# Patient Record
Sex: MALE | Race: WHITE | Employment: FULL TIME | URBAN - METROPOLITAN AREA
[De-identification: names, ages, dates, MRNs, and addresses within clinical notes are randomized per-mention and may not be internally consistent; named-entity substitution may affect disease eponyms.]

---

## 2017-02-22 RX ORDER — AMOXICILLIN 500 MG/1
500 CAPSULE ORAL 3 TIMES DAILY
Qty: 21 CAP | Refills: 0 | Status: SHIPPED | OUTPATIENT
Start: 2017-02-22 | End: 2017-03-01

## 2017-06-07 ENCOUNTER — OFFICE VISIT (OUTPATIENT)
Dept: FAMILY MEDICINE CLINIC | Age: 47
End: 2017-06-07

## 2017-06-07 VITALS
OXYGEN SATURATION: 96 % | RESPIRATION RATE: 28 BRPM | HEART RATE: 87 BPM | HEIGHT: 71 IN | DIASTOLIC BLOOD PRESSURE: 94 MMHG | WEIGHT: 242.4 LBS | SYSTOLIC BLOOD PRESSURE: 154 MMHG | BODY MASS INDEX: 33.94 KG/M2 | TEMPERATURE: 98.4 F

## 2017-06-07 DIAGNOSIS — K21.00 GASTROESOPHAGEAL REFLUX DISEASE WITH ESOPHAGITIS: Chronic | ICD-10-CM

## 2017-06-07 DIAGNOSIS — E11.3299 TYPE 2 DIABETES MELLITUS WITH MILD NONPROLIFERATIVE RETINOPATHY WITHOUT MACULAR EDEMA, WITH LONG-TERM CURRENT USE OF INSULIN, UNSPECIFIED LATERALITY (HCC): Primary | ICD-10-CM

## 2017-06-07 DIAGNOSIS — B35.1 ONYCHOMYCOSIS: ICD-10-CM

## 2017-06-07 DIAGNOSIS — Z79.4 TYPE 2 DIABETES MELLITUS WITH MILD NONPROLIFERATIVE RETINOPATHY WITHOUT MACULAR EDEMA, WITH LONG-TERM CURRENT USE OF INSULIN, UNSPECIFIED LATERALITY (HCC): Primary | ICD-10-CM

## 2017-06-07 DIAGNOSIS — N52.9 ERECTILE DYSFUNCTION, UNSPECIFIED ERECTILE DYSFUNCTION TYPE: Chronic | ICD-10-CM

## 2017-06-07 LAB
GLUCOSE POC: 56 MG/DL
HBA1C MFR BLD HPLC: 7.4 %

## 2017-06-07 RX ORDER — VARDENAFIL HYDROCHLORIDE 20 MG/1
20 TABLET ORAL AS NEEDED
Qty: 3 TAB | Refills: 5 | Status: SHIPPED | OUTPATIENT
Start: 2017-06-07

## 2017-06-07 RX ORDER — GLIMEPIRIDE 4 MG/1
4 TABLET ORAL
Qty: 30 TAB | Refills: 11 | Status: SHIPPED | OUTPATIENT
Start: 2017-06-07 | End: 2017-09-22 | Stop reason: ALTCHOICE

## 2017-06-07 RX ORDER — PIOGLITAZONEHYDROCHLORIDE 45 MG/1
TABLET ORAL DAILY
COMMUNITY
End: 2017-08-03

## 2017-06-07 RX ORDER — METFORMIN HYDROCHLORIDE 750 MG/1
750 TABLET, EXTENDED RELEASE ORAL DAILY
Qty: 60 TAB | Refills: 11 | Status: SHIPPED | OUTPATIENT
Start: 2017-06-07 | End: 2017-09-22 | Stop reason: ALTCHOICE

## 2017-06-07 RX ORDER — TERBINAFINE HYDROCHLORIDE 250 MG/1
250 TABLET ORAL DAILY
Qty: 30 TAB | Refills: 2 | Status: SHIPPED | OUTPATIENT
Start: 2017-06-07 | End: 2017-08-03

## 2017-06-07 NOTE — PROGRESS NOTES
Subjective:     Shantel Navarro is a 51 y.o. male presenting for annual exam and complete physical.    Patient Active Problem List   Diagnosis Code    Type II or unspecified type diabetes mellitus with neurological manifestations, not stated as uncontrolled(250.60) E11.49    Esophageal reflux K21.9    ED (erectile dysfunction) N52.9    Encounter for long-term (current) use of other medications Z79.899    Retinopathy due to secondary diabetes [facility]) E13.319     Patient Active Problem List    Diagnosis Date Noted    Retinopathy due to secondary diabetes ([address] 06/15/2012    Encounter for long-term (current) use of other medications 06/14/2012    Esophageal reflux 03/07/2012    ED (erectile dysfunction) 03/07/2012    Type II or unspecified type diabetes mellitus with neurological manifestations, not stated as uncontrolled(250.60) 02/13/2012     Class: Chronic     Current Outpatient Prescriptions   Medication Sig Dispense Refill    pioglitazone (ACTOS) 45 mg tablet Take  by mouth daily.  glimepiride (AMARYL) 4 mg tablet Take 1 Tab by mouth every morning. 30 Tab 11    metFORMIN ER (GLUCOPHAGE XR) 750 mg tablet Take 1 Tab by mouth daily. 60 Tab 11    terbinafine HCl (LAMISIL) 250 mg tablet Take 1 Tab by mouth daily. 30 Tab 2    glucose blood VI test strips (BLOOD GLUCOSE TEST) strip by Does Not Apply route Daily (before breakfast). 30 Strip 11    vardenafil (LEVITRA) 20 mg tablet Take 20 mg by mouth as needed. 3 Tab 5    glucose blood VI test strips (ACCU-CHEK ADVANTAGE TEST) strip by Does Not Apply route four (4) times daily.  4 TIMES DAILY   100 Strip 11    Blood-Glucose Meter monitoring kit As directed 1 Kit 0    glucose blood VI test strips (BLOOD GLUCOSE TEST) strip Twice daily as needed 1 Package 11    Blood-Glucose Meter monitoring kit 4 TIMESDAILY 1 Kit 0    glucose blood VI test strips (FREESTYLE TEST) strip Twice daily 100 Strip 11    HUM INSULIN NPH/REG INSULIN HM (HUMULIN 70/30 SC) by SubCUTAneous route. No Known Allergies  Past Medical History:   Diagnosis Date    Diabetes ([facility]     ED (erectile dysfunction) 3/7/2012    Encounter for long-term (current) use of other medications 6/14/2012    Esophageal reflux 3/7/2012    IDDM (insulin dependent diabetes mellitus) [facility] 2/13/2012    Retinopathy due to secondary diabetes ([facility] 6/15/2012    Type II or unspecified type diabetes mellitus with neurological manifestations, not stated as uncontrolled 2/13/2012     Past Surgical History:   Procedure Laterality Date    HX ORTHOPAEDIC  1988    Knee surgery.      Family History   Problem Relation Age of Onset    Diabetes Mother     Heart Disease Mother     Hypertension Mother     Heart Disease Father      Social History   Substance Use Topics    Smoking status: Former Smoker    Smokeless tobacco: Former User     Quit date: 5/13/1995    Alcohol use Yes      Comment: socially             Review of Systems  Constitutional: negative  Eyes: negative  Ears, nose, mouth, throat, and face: negative  Respiratory: negative  Cardiovascular: negative  Gastrointestinal: negative  Genitourinary:positive for sexual problems    Objective:     Visit Vitals    BP (!) 154/94 (BP 1 Location: Right arm, BP Patient Position: Sitting)    Pulse 87    Temp 98.4 °F (36.9 °C) (Oral)    Resp 28    Ht 5' 11\" (1.803 m)    Wt 242 lb 6.4 oz (110 kg)    SpO2 96%    BMI 33.81 kg/m2     Physical exam:   General appearance - alert, well appearing, and in no distress  Mental status - alert, oriented to person, place, and time  Eyes - pupils equal and reactive, extraocular eye movements intact  Ears - bilateral TM's and external ear canals normal  Nose - normal and patent, no erythema, discharge or polyps  Mouth - mucous membranes moist, pharynx normal without lesions  Neck - supple, no significant adenopathy, carotids upstroke normal bilaterally, no bruits, thyroid exam: thyroid is normal in size without nodules or tenderness  Lymphatics - no palpable lymphadenopathy, no hepatosplenomegaly  Chest - clear to auscultation, no wheezes, rales or rhonchi, symmetric air entry  Heart - normal rate, regular rhythm, normal S1, S2, no murmurs, rubs, clicks or gallops  Abdomen - soft, nontender, nondistended, no masses or organomegaly  Rectal - negative without mass, lesions or tenderness, stool guaiac negative, PROSTATE EXAM: smooth and symmetric without nodules or tenderness  Extremities - peripheral pulses normal, no pedal edema, no clubbing or cyanosis  Skin - normal coloration and turgor, no rashes, no suspicious skin lesions noted            Comprehensive Diabetic Foot Exam  was performed    Assessment/Plan:     continue present diet with no restrictions, continue present plan, call if any problems. Zamzam Preciado was seen today for diabetes, hypertension and cholesterol problem. Diagnoses and all orders for this visit:    Type 2 diabetes mellitus with mild nonproliferative retinopathy without macular edema, with long-term current use of insulin, unspecified laterality  -     AMB POC HEMOGLOBIN A6N  -     METABOLIC PANEL, COMPREHENSIVE  -     CHOLESTEROL, TOTAL  -     AMB POC GLUCOSE, QUANTITATIVE, BLOOD  -     MICROALBUMIN, UR, RAND W/ MICROALBUMIN/CREA RATIO  -      DIABETES FOOT EXAM  [order this if you have performed a diabetic foot exam today)  -     glimepiride (AMARYL) 4 mg tablet; Take 1 Tab by mouth every morning.  -     metFORMIN ER (GLUCOPHAGE XR) 750 mg tablet; Take 1 Tab by mouth daily.  -     glucose blood VI test strips (BLOOD GLUCOSE TEST) strip; by Does Not Apply route Daily (before breakfast). Gastroesophageal reflux disease with esophagitis  -     METABOLIC PANEL, COMPREHENSIVE  -     CBC WITH AUTOMATED DIFF    Erectile dysfunction, unspecified erectile dysfunction type  -     vardenafil (LEVITRA) 20 mg tablet; Take 20 mg by mouth as needed.     Onychomycosis  -     terbinafine HCl (LAMISIL) 250 mg tablet; Take 1 Tab by mouth daily. Follow-up Disposition:  Return in about 4 weeks (around 7/5/2017). Yuridia Liu

## 2017-06-07 NOTE — MR AVS SNAPSHOT
Visit Information Date & Time Provider Department Dept. Phone Encounter #  
 6/7/2017  4:00 PM John Powell [phone] [id number] Follow-up Instructions Return in about 4 weeks (around 7/5/2017). Upcoming Health Maintenance Date Due  
 FOOT EXAM Q1 10/3/1979 HEMOGLOBIN A1C Q6M 6/13/2014 EYE EXAM RETINAL OR DILATED Q1 11/22/2014 MICROALBUMIN Q1 12/13/2014 LIPID PANEL Q1 12/13/2014 INFLUENZA AGE 9 TO ADULT 8/1/2017 DTaP/Tdap/Td series (2 - Td) 6/7/2027 Allergies as of 6/7/2017  Review Complete On: 6/7/2017 By: Willy Negron No Known Allergies Current Immunizations  Never Reviewed No immunizations on file. Not reviewed this visit You Were Diagnosed With   
  
 Codes Comments Type 2 diabetes mellitus with mild nonproliferative retinopathy without macular edema, with long-term current use of insulin, unspecified laterality    -  Primary ICD-10-CM: E11.3299, Z79.4 ICD-9-CM: 250.50, 362.04, V58.67 Gastroesophageal reflux disease with esophagitis     ICD-10-CM: K21.0 ICD-9-CM: 530.11 Erectile dysfunction, unspecified erectile dysfunction type     ICD-10-CM: N52.9 ICD-9-CM: 607.84 Onychomycosis     ICD-10-CM: B35.1 ICD-9-CM: 110.1 Vitals BP Pulse Temp Resp Height(growth percentile) Weight(growth percentile) (!) 154/94 (BP 1 Location: Right arm, BP Patient Position: Sitting) 87 98.4 °F (36.9 °C) (Oral) 28 5' 11\" (1.803 m) 242 lb 6.4 oz (110 kg) SpO2 BMI Smoking Status 96% 33.81 kg/m2 Former Smoker Vitals History BMI and BSA Data Body Mass Index Body Surface Area  
 33.81 kg/m 2 2.35 m 2 Preferred Pharmacy Pharmacy Name Phone [id number] [address] [phone] Your Updated Medication List  
  
   
This list is accurate as of: 6/7/17  4:50 PM.  Always use your most recent med list.  
 ACTOS 45 mg tablet Generic drug:  pioglitazone Take  by mouth daily. * Blood-Glucose Meter monitoring kit  
4 TIMESDAILY * Blood-Glucose Meter monitoring kit As directed  
  
 glimepiride 4 mg tablet Commonly known as:  AMARYL Take 1 Tab by mouth every morning. * glucose blood VI test strips strip Commonly known as:  FREESTYLE TEST Twice daily * glucose blood VI test strips strip Commonly known as:  ACCU-CHEK ADVANTAGE TEST  
by Does Not Apply route four (4) times daily. 4 TIMES DAILY * glucose blood VI test strips strip Commonly known as:  blood glucose test  
Twice daily as needed * glucose blood VI test strips strip Commonly known as:  blood glucose test  
by Does Not Apply route Daily (before breakfast). HUMULIN 70/30 SC  
by SubCUTAneous route. metFORMIN  mg tablet Commonly known as:  GLUCOPHAGE XR Take 1 Tab by mouth daily. terbinafine HCl 250 mg tablet Commonly known as:  LAMISIL Take 1 Tab by mouth daily. vardenafil 20 mg tablet Commonly known as:  LEVITRA Take 20 mg by mouth as needed. * Notice: This list has 6 medication(s) that are the same as other medications prescribed for you. Read the directions carefully, and ask your doctor or other care provider to review them with you. Prescriptions Printed Refills  
 vardenafil (LEVITRA) 20 mg tablet 5 Sig: Take 20 mg by mouth as needed. Class: Print Route: Oral  
  
Prescriptions Sent to Pharmacy Refills  
 glimepiride (AMARYL) 4 mg tablet 11 Sig: Take 1 Tab by mouth every morning. Class: Normal  
 Pharmacy: [id number] [facility], [facility] [address]  #: [phone] Route: Oral  
 metFORMIN ER (GLUCOPHAGE XR) 750 mg tablet 11 Sig: Take 1 Tab by mouth daily.   
 Class: Normal  
 [facility]: [id number] [address] Ph #: [phone] Route: Oral  
 terbinafine HCl (LAMISIL) 250 mg tablet 2 Sig: Take 1 Tab by mouth daily. Class: Normal  
 Pharmacy: [facility] [id number] #: [phone] Route: Oral  
 glucose blood VI test strips (BLOOD GLUCOSE TEST) strip 11 Sig: by Does Not Apply route Daily (before breakfast). Class: Normal  
 Pharmacy: [facility] [id number] #: [phone] Route: Does Not Apply We Performed the Following AMB POC GLUCOSE, QUANTITATIVE, BLOOD [11571 CPT(R)] AMB POC HEMOGLOBIN A1C [05710 CPT(R)] CBC WITH AUTOMATED DIFF [04924 CPT(R)] CHOLESTEROL, TOTAL [23441 CPT(R)]  DIABETES FOOT EXAM [HM7 Custom] METABOLIC PANEL, COMPREHENSIVE [30096 CPT(R)] MICROALBUMIN, UR, RAND W/ MICROALBUMIN/CREA RATIO [id number] CPT(R)] Follow-up Instructions Return in about 4 weeks (around 7/5/2017). Introducing [facility]! Cheo Lopez introduces [employer] patient portal. Now you can access parts of your medical record, email your doctor's office, and request medication refills online. 1. In your internet browser, go to [email or web address] 2. Click on the First Time User? Click Here link in the Sign In box. You will see the New Member Sign Up page. 3. Enter your [employer] Access Code exactly as it appears below. You will not need to use this code after youve completed the sign-up process. If you do not sign up before the expiration date, you must request a new code. · [employer] Access Code: [email or web address] Expires: 9/5/2017  4:25 PM 
 
4. Enter the last four digits of your Social Security Number (xxxx) and Date of Birth (mm/dd/yyyy) as indicated and click Submit. You will be taken to the next sign-up page. 5. Create a [employer] ID.  This will be your [employer] login ID and cannot be changed, so think of one that is secure and easy to remember. 6. Create a [employer] password. You can change your password at any time. 7. Enter your Password Reset Question and Answer. This can be used at a later time if you forget your password. 8. Enter your e-mail address. You will receive e-mail notification when new information is available in [address] 9. Click Sign Up. You can now view and download portions of your medical record. 10. Click the Download Summary menu link to download a portable copy of your medical information. If you have questions, please visit the Frequently Asked Questions section of the [employer] website. Remember, [employer] is NOT to be used for urgent needs. For medical emergencies, dial 911. Now available from your iPhone and Android! Please provide this summary of care documentation to your next provider. Your primary care clinician is listed as Jeane Duncan. If you have any questions after today's visit, please call [phone].

## 2017-06-08 LAB
ALBUMIN SERPL-MCNC: 4.6 G/DL (ref 3.5–5.5)
ALBUMIN/CREAT UR: 125.1 MG/G CREAT (ref 0–30)
ALBUMIN/GLOB SERPL: 1.9 {RATIO} (ref 1.2–2.2)
ALP SERPL-CCNC: 122 IU/L (ref 39–117)
ALT SERPL-CCNC: 57 IU/L (ref 0–44)
AST SERPL-CCNC: 39 IU/L (ref 0–40)
BASOPHILS # BLD AUTO: 0 X10E3/UL (ref 0–0.2)
BASOPHILS NFR BLD AUTO: 0 %
BILIRUB SERPL-MCNC: 0.5 MG/DL (ref 0–1.2)
BUN SERPL-MCNC: 20 MG/DL (ref 6–24)
BUN/CREAT SERPL: 16 (ref 9–20)
CALCIUM SERPL-MCNC: 9.5 MG/DL (ref 8.7–10.2)
CHLORIDE SERPL-SCNC: 101 MMOL/L (ref 96–106)
CHOLEST SERPL-MCNC: 219 MG/DL (ref 100–199)
CO2 SERPL-SCNC: 28 MMOL/L (ref 18–29)
CREAT SERPL-MCNC: 1.27 MG/DL (ref 0.76–1.27)
CREAT UR-MCNC: 37.8 MG/DL
EOSINOPHIL # BLD AUTO: 0.2 X10E3/UL (ref 0–0.4)
EOSINOPHIL NFR BLD AUTO: 2 %
ERYTHROCYTE [DISTWIDTH] IN BLOOD BY AUTOMATED COUNT: 14.1 % (ref 12.3–15.4)
GLOBULIN SER CALC-MCNC: 2.4 G/DL (ref 1.5–4.5)
GLUCOSE SERPL-MCNC: 68 MG/DL (ref 65–99)
HCT VFR BLD AUTO: 48.6 % (ref 37.5–51)
HGB BLD-MCNC: 16.8 G/DL (ref 12.6–17.7)
IMM GRANULOCYTES # BLD: 0 X10E3/UL (ref 0–0.1)
IMM GRANULOCYTES NFR BLD: 0 %
LYMPHOCYTES # BLD AUTO: 2.2 X10E3/UL (ref 0.7–3.1)
LYMPHOCYTES NFR BLD AUTO: 34 %
MCH RBC QN AUTO: 30.6 PG (ref 26.6–33)
MCHC RBC AUTO-ENTMCNC: 34.6 G/DL (ref 31.5–35.7)
MCV RBC AUTO: 89 FL (ref 79–97)
MICROALBUMIN UR-MCNC: 47.3 UG/ML
MONOCYTES # BLD AUTO: 0.6 X10E3/UL (ref 0.1–0.9)
MONOCYTES NFR BLD AUTO: 10 %
NEUTROPHILS # BLD AUTO: 3.5 X10E3/UL (ref 1.4–7)
NEUTROPHILS NFR BLD AUTO: 54 %
PLATELET # BLD AUTO: 206 X10E3/UL (ref 150–379)
POTASSIUM SERPL-SCNC: 4.7 MMOL/L (ref 3.5–5.2)
PROT SERPL-MCNC: 7 G/DL (ref 6–8.5)
RBC # BLD AUTO: 5.49 X10E6/UL (ref 4.14–5.8)
SODIUM SERPL-SCNC: 146 MMOL/L (ref 134–144)
WBC # BLD AUTO: 6.4 X10E3/UL (ref 3.4–10.8)

## 2017-07-31 ENCOUNTER — HOSPITAL ENCOUNTER (INPATIENT)
Age: 47
LOS: 3 days | Discharge: HOME | DRG: 637 | End: 2017-08-03
Attending: EMERGENCY MEDICINE | Admitting: INTERNAL MEDICINE
Payer: COMMERCIAL

## 2017-07-31 ENCOUNTER — APPOINTMENT (OUTPATIENT)
Dept: GENERAL RADIOLOGY | Age: 47
DRG: 637 | End: 2017-07-31
Attending: EMERGENCY MEDICINE
Payer: COMMERCIAL

## 2017-07-31 DIAGNOSIS — E87.5 ACUTE HYPERKALEMIA: ICD-10-CM

## 2017-07-31 DIAGNOSIS — E87.20 LACTIC ACID ACIDOSIS: ICD-10-CM

## 2017-07-31 DIAGNOSIS — N17.9 ACUTE RENAL FAILURE, UNSPECIFIED ACUTE RENAL FAILURE TYPE (HCC): ICD-10-CM

## 2017-07-31 DIAGNOSIS — R11.10 VOMITING, INTRACTABILITY OF VOMITING NOT SPECIFIED, PRESENCE OF NAUSEA NOT SPECIFIED, UNSPECIFIED VOMITING TYPE: ICD-10-CM

## 2017-07-31 DIAGNOSIS — E11.10 DIABETIC KETOACIDOSIS WITHOUT COMA ASSOCIATED WITH TYPE 2 DIABETES MELLITUS (HCC): Primary | ICD-10-CM

## 2017-07-31 LAB
ADMINISTERED INITIALS, ADMINIT: NORMAL
ANION GAP BLD CALC-SCNC: 14 MMOL/L (ref 5–15)
ANION GAP BLD CALC-SCNC: 28 MMOL/L (ref 5–15)
ANION GAP BLD CALC-SCNC: 33 MMOL/L (ref 5–15)
ANION GAP BLD CALC-SCNC: ABNORMAL MMOL/L (ref 5–15)
APPEARANCE UR: ABNORMAL
ATRIAL RATE: 124 BPM
BACTERIA URNS QL MICRO: NEGATIVE /HPF
BASE DEFICIT BLDV-SCNC: 28.9 MMOL/L
BASOPHILS # BLD AUTO: 0 K/UL
BASOPHILS # BLD: 0 %
BDY SITE: ABNORMAL
BILIRUB UR QL: NEGATIVE
BUN BLD-MCNC: 53 MG/DL (ref 9–20)
BUN SERPL-MCNC: 31 MG/DL (ref 6–20)
BUN SERPL-MCNC: 40 MG/DL (ref 6–20)
BUN SERPL-MCNC: 48 MG/DL (ref 6–20)
BUN/CREAT SERPL: 15 (ref 12–20)
BUN/CREAT SERPL: 17 (ref 12–20)
BUN/CREAT SERPL: 17 (ref 12–20)
CA-I BLD-MCNC: 1.12 MMOL/L (ref 1.12–1.32)
CALCIUM SERPL-MCNC: 12.9 MG/DL (ref 8.5–10.1)
CALCIUM SERPL-MCNC: 7.2 MG/DL (ref 8.5–10.1)
CALCIUM SERPL-MCNC: 7.8 MG/DL (ref 8.5–10.1)
CALCULATED P AXIS, ECG09: 59 DEGREES
CALCULATED R AXIS, ECG10: 13 DEGREES
CALCULATED T AXIS, ECG11: 48 DEGREES
CHLORIDE BLD-SCNC: 92 MMOL/L (ref 98–107)
CHLORIDE SERPL-SCNC: 100 MMOL/L (ref 97–108)
CHLORIDE SERPL-SCNC: 108 MMOL/L (ref 97–108)
CHLORIDE SERPL-SCNC: 110 MMOL/L (ref 97–108)
CO2 BLD-SCNC: 8 MMOL/L (ref 21–32)
CO2 SERPL-SCNC: 20 MMOL/L (ref 21–32)
CO2 SERPL-SCNC: 7 MMOL/L (ref 21–32)
CO2 SERPL-SCNC: <5 MMOL/L (ref 21–32)
COLOR UR: ABNORMAL
CREAT BLD-MCNC: 2.3 MG/DL (ref 0.6–1.3)
CREAT SERPL-MCNC: 1.8 MG/DL (ref 0.7–1.3)
CREAT SERPL-MCNC: 2.63 MG/DL (ref 0.7–1.3)
CREAT SERPL-MCNC: 2.89 MG/DL (ref 0.7–1.3)
D50 ADMINISTERED, D50ADM: 0 ML
D50 ORDER, D50ORD: 0 ML
DIAGNOSIS, 93000: NORMAL
DIFFERENTIAL METHOD BLD: ABNORMAL
EOSINOPHIL # BLD: 0.3 K/UL
EOSINOPHIL NFR BLD: 1 %
EPITH CASTS URNS QL MICRO: ABNORMAL /LPF
ERYTHROCYTE [DISTWIDTH] IN BLOOD BY AUTOMATED COUNT: 14.7 % (ref 11.5–14.5)
EST. AVERAGE GLUCOSE BLD GHB EST-MCNC: 226 MG/DL
GLSCOM COMMENTS: NORMAL
GLUCOSE BLD STRIP.AUTO-MCNC: 304 MG/DL (ref 65–100)
GLUCOSE BLD STRIP.AUTO-MCNC: 346 MG/DL (ref 65–100)
GLUCOSE BLD STRIP.AUTO-MCNC: 415 MG/DL (ref 65–100)
GLUCOSE BLD STRIP.AUTO-MCNC: 513 MG/DL (ref 65–100)
GLUCOSE BLD STRIP.AUTO-MCNC: 569 MG/DL (ref 65–100)
GLUCOSE BLD STRIP.AUTO-MCNC: >600 MG/DL (ref 65–100)
GLUCOSE BLD-MCNC: >700 MG/DL (ref 65–100)
GLUCOSE SERPL-MCNC: 401 MG/DL (ref 65–100)
GLUCOSE SERPL-MCNC: 765 MG/DL (ref 65–100)
GLUCOSE SERPL-MCNC: 851 MG/DL (ref 65–100)
GLUCOSE UR STRIP.AUTO-MCNC: >1000 MG/DL
GLUCOSE, GLC: 304 MG/DL
GLUCOSE, GLC: 346 MG/DL
GLUCOSE, GLC: 415 MG/DL
GLUCOSE, GLC: 513 MG/DL
GLUCOSE, GLC: 569 MG/DL
GLUCOSE, GLC: 601 MG/DL
HBA1C MFR BLD: 9.5 % (ref 4.2–6.3)
HCO3 BLDV-SCNC: 4 MMOL/L (ref 23–28)
HCT VFR BLD AUTO: 46.7 % (ref 36.6–50.3)
HCT VFR BLD CALC: 49 % (ref 36.6–50.3)
HGB BLD-MCNC: 15.4 G/DL (ref 12.1–17)
HGB BLD-MCNC: 16.7 GM/DL (ref 12.1–17)
HGB UR QL STRIP: ABNORMAL
HIGH TARGET, HITG: 250 MG/DL
HYALINE CASTS URNS QL MICRO: ABNORMAL /LPF (ref 0–5)
INSULIN ADMINSTERED, INSADM: 10.8 UNITS/HOUR
INSULIN ADMINSTERED, INSADM: 16.2 UNITS/HOUR
INSULIN ADMINSTERED, INSADM: 21.6 UNITS/HOUR
INSULIN ADMINSTERED, INSADM: 24.4 UNITS/HOUR
INSULIN ADMINSTERED, INSADM: 25.7 UNITS/HOUR
INSULIN ADMINSTERED, INSADM: 27.1 UNITS/HOUR
INSULIN ADMINSTERED, INSADM: 28.4 UNITS/HOUR
INSULIN ADMINSTERED, INSADM: 32.5 UNITS/HOUR
INSULIN ADMINSTERED, INSADM: 35.6 UNITS/HOUR
INSULIN ADMINSTERED, INSADM: 36.2 UNITS/HOUR
INSULIN ORDER, INSORD: 10.8 UNITS/HOUR
INSULIN ORDER, INSORD: 16.2 UNITS/HOUR
INSULIN ORDER, INSORD: 21.6 UNITS/HOUR
INSULIN ORDER, INSORD: 24.4 UNITS/HOUR
INSULIN ORDER, INSORD: 25.7 UNITS/HOUR
INSULIN ORDER, INSORD: 27.1 UNITS/HOUR
INSULIN ORDER, INSORD: 28.4 UNITS/HOUR
INSULIN ORDER, INSORD: 32.5 UNITS/HOUR
INSULIN ORDER, INSORD: 35.6 UNITS/HOUR
INSULIN ORDER, INSORD: 36.2 UNITS/HOUR
KETONES UR QL STRIP.AUTO: 40 MG/DL
LACTATE SERPL-SCNC: 3 MMOL/L (ref 0.4–2)
LACTATE SERPL-SCNC: 9.8 MMOL/L (ref 0.4–2)
LEUKOCYTE ESTERASE UR QL STRIP.AUTO: NEGATIVE
LOW TARGET, LOT: 150 MG/DL
LYMPHOCYTES # BLD AUTO: 8 %
LYMPHOCYTES # BLD: 2.5 K/UL
MAGNESIUM SERPL-MCNC: 1.5 MG/DL (ref 1.6–2.4)
MAGNESIUM SERPL-MCNC: 2.2 MG/DL (ref 1.6–2.4)
MAGNESIUM SERPL-MCNC: 2.4 MG/DL (ref 1.6–2.4)
MAGNESIUM SERPL-MCNC: 2.5 MG/DL (ref 1.6–2.4)
MCH RBC QN AUTO: 31.3 PG (ref 26–34)
MCHC RBC AUTO-ENTMCNC: 33 G/DL (ref 30–36.5)
MCV RBC AUTO: 94.9 FL (ref 80–99)
MINUTES UNTIL NEXT BG, NBG: 60 MIN
MONOCYTES # BLD: 0 K/UL
MONOCYTES NFR BLD AUTO: 0 %
MULTIPLIER, MUL: 0.02
MULTIPLIER, MUL: 0.03
MULTIPLIER, MUL: 0.04
MULTIPLIER, MUL: 0.05
MULTIPLIER, MUL: 0.06
MULTIPLIER, MUL: 0.07
MULTIPLIER, MUL: 0.08
MULTIPLIER, MUL: 0.08
MULTIPLIER, MUL: 0.09
MULTIPLIER, MUL: 0.1
NEUTS BAND NFR BLD MANUAL: 18 %
NEUTS SEG # BLD: 28.6 K/UL
NEUTS SEG NFR BLD AUTO: 73 %
NITRITE UR QL STRIP.AUTO: NEGATIVE
ORDER INITIALS, ORDINIT: NORMAL
P-R INTERVAL, ECG05: 154 MS
PCO2 BLDV: 20 MMHG (ref 41–51)
PH BLDV: 6.87 [PH] (ref 7.32–7.42)
PH UR STRIP: 5 [PH] (ref 5–8)
PHOSPHATE SERPL-MCNC: 4.8 MG/DL (ref 2.6–4.7)
PHOSPHATE SERPL-MCNC: 6.1 MG/DL (ref 2.6–4.7)
PHOSPHATE SERPL-MCNC: <0.5 MG/DL (ref 2.6–4.7)
PLATELET # BLD AUTO: 295 K/UL (ref 150–400)
PO2 BLDV: 59 MMHG (ref 25–40)
POTASSIUM BLD-SCNC: 6.1 MMOL/L (ref 3.5–5.1)
POTASSIUM SERPL-SCNC: 3.4 MMOL/L (ref 3.5–5.1)
POTASSIUM SERPL-SCNC: 3.7 MMOL/L (ref 3.5–5.1)
POTASSIUM SERPL-SCNC: 4.5 MMOL/L (ref 3.5–5.1)
PROT UR STRIP-MCNC: 30 MG/DL
Q-T INTERVAL, ECG07: 330 MS
QRS DURATION, ECG06: 100 MS
QTC CALCULATION (BEZET), ECG08: 474 MS
RBC # BLD AUTO: 4.92 M/UL (ref 4.1–5.7)
RBC #/AREA URNS HPF: ABNORMAL /HPF (ref 0–5)
RBC MORPH BLD: ABNORMAL
SAO2 % BLDV: 68 % (ref 65–88)
SAO2% DEVICE SAO2% SENSOR NAME: ABNORMAL
SERVICE CMNT-IMP: ABNORMAL
SODIUM BLD-SCNC: 126 MMOL/L (ref 136–145)
SODIUM SERPL-SCNC: 135 MMOL/L (ref 136–145)
SODIUM SERPL-SCNC: 139 MMOL/L (ref 136–145)
SODIUM SERPL-SCNC: 144 MMOL/L (ref 136–145)
SP GR UR REFRACTOMETRY: 1.02 (ref 1–1.03)
SPECIMEN SITE: ABNORMAL
UROBILINOGEN UR QL STRIP.AUTO: 0.2 EU/DL (ref 0.2–1)
VENTRICULAR RATE, ECG03: 124 BPM
WBC # BLD AUTO: 31.4 K/UL (ref 4.1–11.1)
WBC URNS QL MICRO: ABNORMAL /HPF (ref 0–4)

## 2017-07-31 PROCEDURE — 82962 GLUCOSE BLOOD TEST: CPT

## 2017-07-31 PROCEDURE — 83735 ASSAY OF MAGNESIUM: CPT | Performed by: EMERGENCY MEDICINE

## 2017-07-31 PROCEDURE — 36415 COLL VENOUS BLD VENIPUNCTURE: CPT | Performed by: FAMILY MEDICINE

## 2017-07-31 PROCEDURE — 82010 KETONE BODYS QUAN: CPT | Performed by: EMERGENCY MEDICINE

## 2017-07-31 PROCEDURE — 74011000250 HC RX REV CODE- 250: Performed by: HOSPITALIST

## 2017-07-31 PROCEDURE — 74011250636 HC RX REV CODE- 250/636: Performed by: HOSPITALIST

## 2017-07-31 PROCEDURE — 80048 BASIC METABOLIC PNL TOTAL CA: CPT | Performed by: HOSPITALIST

## 2017-07-31 PROCEDURE — 74011636637 HC RX REV CODE- 636/637: Performed by: EMERGENCY MEDICINE

## 2017-07-31 PROCEDURE — 80048 BASIC METABOLIC PNL TOTAL CA: CPT | Performed by: FAMILY MEDICINE

## 2017-07-31 PROCEDURE — 83605 ASSAY OF LACTIC ACID: CPT | Performed by: EMERGENCY MEDICINE

## 2017-07-31 PROCEDURE — 81001 URINALYSIS AUTO W/SCOPE: CPT | Performed by: EMERGENCY MEDICINE

## 2017-07-31 PROCEDURE — 80047 BASIC METABLC PNL IONIZED CA: CPT

## 2017-07-31 PROCEDURE — 74011000250 HC RX REV CODE- 250: Performed by: EMERGENCY MEDICINE

## 2017-07-31 PROCEDURE — 82803 BLOOD GASES ANY COMBINATION: CPT | Performed by: EMERGENCY MEDICINE

## 2017-07-31 PROCEDURE — 74011250636 HC RX REV CODE- 250/636: Performed by: INTERNAL MEDICINE

## 2017-07-31 PROCEDURE — 83735 ASSAY OF MAGNESIUM: CPT | Performed by: INTERNAL MEDICINE

## 2017-07-31 PROCEDURE — 96368 THER/DIAG CONCURRENT INF: CPT

## 2017-07-31 PROCEDURE — 83036 HEMOGLOBIN GLYCOSYLATED A1C: CPT | Performed by: EMERGENCY MEDICINE

## 2017-07-31 PROCEDURE — 84100 ASSAY OF PHOSPHORUS: CPT | Performed by: FAMILY MEDICINE

## 2017-07-31 PROCEDURE — 99285 EMERGENCY DEPT VISIT HI MDM: CPT

## 2017-07-31 PROCEDURE — 87040 BLOOD CULTURE FOR BACTERIA: CPT | Performed by: EMERGENCY MEDICINE

## 2017-07-31 PROCEDURE — 85025 COMPLETE CBC W/AUTO DIFF WBC: CPT | Performed by: EMERGENCY MEDICINE

## 2017-07-31 PROCEDURE — 93005 ELECTROCARDIOGRAM TRACING: CPT

## 2017-07-31 PROCEDURE — 71010 XR CHEST PORT: CPT

## 2017-07-31 PROCEDURE — 96366 THER/PROPH/DIAG IV INF ADDON: CPT

## 2017-07-31 PROCEDURE — 65660000000 HC RM CCU STEPDOWN

## 2017-07-31 PROCEDURE — 74011250636 HC RX REV CODE- 250/636: Performed by: EMERGENCY MEDICINE

## 2017-07-31 PROCEDURE — 83735 ASSAY OF MAGNESIUM: CPT | Performed by: FAMILY MEDICINE

## 2017-07-31 PROCEDURE — 74011000258 HC RX REV CODE- 258: Performed by: EMERGENCY MEDICINE

## 2017-07-31 PROCEDURE — 96375 TX/PRO/DX INJ NEW DRUG ADDON: CPT

## 2017-07-31 PROCEDURE — 84100 ASSAY OF PHOSPHORUS: CPT | Performed by: INTERNAL MEDICINE

## 2017-07-31 PROCEDURE — 96365 THER/PROPH/DIAG IV INF INIT: CPT

## 2017-07-31 PROCEDURE — 84100 ASSAY OF PHOSPHORUS: CPT | Performed by: EMERGENCY MEDICINE

## 2017-07-31 PROCEDURE — 80048 BASIC METABOLIC PNL TOTAL CA: CPT | Performed by: EMERGENCY MEDICINE

## 2017-07-31 RX ORDER — ACETAMINOPHEN 325 MG/1
650 TABLET ORAL
Status: DISCONTINUED | OUTPATIENT
Start: 2017-07-31 | End: 2017-08-03 | Stop reason: HOSPADM

## 2017-07-31 RX ORDER — MAGNESIUM SULFATE 100 %
4 CRYSTALS MISCELLANEOUS AS NEEDED
Status: DISCONTINUED | OUTPATIENT
Start: 2017-07-31 | End: 2017-08-03 | Stop reason: HOSPADM

## 2017-07-31 RX ORDER — DEXTROSE 50 % IN WATER (D50W) INTRAVENOUS SYRINGE
12.5-25 AS NEEDED
Status: DISCONTINUED | OUTPATIENT
Start: 2017-07-31 | End: 2017-08-03 | Stop reason: HOSPADM

## 2017-07-31 RX ORDER — SODIUM CHLORIDE 9 MG/ML
2000 INJECTION, SOLUTION INTRAVENOUS ONCE
Status: COMPLETED | OUTPATIENT
Start: 2017-07-31 | End: 2017-07-31

## 2017-07-31 RX ORDER — SODIUM CHLORIDE 0.9 % (FLUSH) 0.9 %
5-10 SYRINGE (ML) INJECTION AS NEEDED
Status: DISCONTINUED | OUTPATIENT
Start: 2017-07-31 | End: 2017-08-03 | Stop reason: HOSPADM

## 2017-07-31 RX ORDER — SODIUM CHLORIDE 9 MG/ML
1000 INJECTION, SOLUTION INTRAVENOUS CONTINUOUS
Status: DISCONTINUED | OUTPATIENT
Start: 2017-07-31 | End: 2017-08-01

## 2017-07-31 RX ORDER — ONDANSETRON 2 MG/ML
4 INJECTION INTRAMUSCULAR; INTRAVENOUS
Status: DISCONTINUED | OUTPATIENT
Start: 2017-07-31 | End: 2017-08-03 | Stop reason: HOSPADM

## 2017-07-31 RX ORDER — ENOXAPARIN SODIUM 100 MG/ML
40 INJECTION SUBCUTANEOUS EVERY 24 HOURS
Status: DISCONTINUED | OUTPATIENT
Start: 2017-07-31 | End: 2017-08-03 | Stop reason: HOSPADM

## 2017-07-31 RX ORDER — SODIUM BICARBONATE 1 MEQ/ML
50 SYRINGE (ML) INTRAVENOUS
Status: COMPLETED | OUTPATIENT
Start: 2017-07-31 | End: 2017-07-31

## 2017-07-31 RX ORDER — SODIUM CHLORIDE 9 MG/ML
250 INJECTION, SOLUTION INTRAVENOUS CONTINUOUS
Status: DISCONTINUED | OUTPATIENT
Start: 2017-07-31 | End: 2017-08-01

## 2017-07-31 RX ORDER — DEXTROSE 50 % IN WATER (D50W) INTRAVENOUS SYRINGE
12.5-25 AS NEEDED
Status: DISCONTINUED | OUTPATIENT
Start: 2017-07-31 | End: 2017-07-31

## 2017-07-31 RX ORDER — MAGNESIUM SULFATE 100 %
4 CRYSTALS MISCELLANEOUS AS NEEDED
Status: DISCONTINUED | OUTPATIENT
Start: 2017-07-31 | End: 2017-07-31

## 2017-07-31 RX ADMIN — SODIUM CHLORIDE 27.1 UNITS/HR: 900 INJECTION, SOLUTION INTRAVENOUS at 17:36

## 2017-07-31 RX ADMIN — SODIUM CHLORIDE 10.8 UNITS/HR: 900 INJECTION, SOLUTION INTRAVENOUS at 12:25

## 2017-07-31 RX ADMIN — POTASSIUM PHOSPHATE, MONOBASIC AND POTASSIUM PHOSPHATE, DIBASIC: 224; 236 INJECTION, SOLUTION INTRAVENOUS at 23:39

## 2017-07-31 RX ADMIN — Medication 10 ML: at 18:00

## 2017-07-31 RX ADMIN — VANCOMYCIN HYDROCHLORIDE 2750 MG: 10 INJECTION, POWDER, LYOPHILIZED, FOR SOLUTION INTRAVENOUS at 13:45

## 2017-07-31 RX ADMIN — SODIUM CHLORIDE 2000 ML: 900 INJECTION, SOLUTION INTRAVENOUS at 11:22

## 2017-07-31 RX ADMIN — SODIUM CHLORIDE 250 ML/HR: 900 INJECTION, SOLUTION INTRAVENOUS at 16:34

## 2017-07-31 RX ADMIN — SODIUM CHLORIDE 250 ML/HR: 900 INJECTION, SOLUTION INTRAVENOUS at 12:33

## 2017-07-31 RX ADMIN — ENOXAPARIN SODIUM 40 MG: 40 INJECTION SUBCUTANEOUS at 17:35

## 2017-07-31 RX ADMIN — SODIUM BICARBONATE 50 MEQ: 84 INJECTION, SOLUTION INTRAVENOUS at 12:44

## 2017-07-31 RX ADMIN — ONDANSETRON 4 MG: 2 INJECTION INTRAMUSCULAR; INTRAVENOUS at 18:00

## 2017-07-31 RX ADMIN — SODIUM CHLORIDE 1000 ML: 900 INJECTION, SOLUTION INTRAVENOUS at 12:19

## 2017-07-31 RX ADMIN — PIPERACILLIN SODIUM,TAZOBACTAM SODIUM 4.5 G: 4; .5 INJECTION, POWDER, FOR SOLUTION INTRAVENOUS at 12:43

## 2017-07-31 RX ADMIN — SODIUM CHLORIDE 1000 ML: 900 INJECTION, SOLUTION INTRAVENOUS at 14:22

## 2017-07-31 RX ADMIN — CALCIUM GLUCONATE 2 G: 94 INJECTION, SOLUTION INTRAVENOUS at 12:14

## 2017-07-31 NOTE — PROGRESS NOTES
PCU SHIFT NURSING NOTE      Bedside and Verbal shift change report given to Jeaneth Arrington (oncoming nurse) by ANGEL Sandoval RN (offgoing nurse). Report included the following information SBAR, Kardex, Procedure Summary, Intake/Output, MAR and Recent Results. Shift Summary: 4046 2242: Received report from ER for patient coming to room 2263  1700: Patient now in room, heart rate sinus tachy in 120's, respirations mid 20's. Complaints of throat pain and discomfort, patient given moistened  mouth swabs. Insulin drip infusing at 27.1. Continuous fluids also infusing at 250 ml/hr  1750: Results back from BMP ordered, glucose at 851 and CO2 7, insulin drip now infusing at 32.5 ml/hr. Dr Cristiana Brennan paged to update on patient's condition  1815: Spoke with Dr. Cristiana Brennan and no new orders received at this time. Will wait for next blood draw at 8 pm to assess CO2 and glucose again. Admission Date 7/31/2017   Admission Diagnosis DKA (diabetic k[facility]acidoses) ([facility]   Consults None        Consults   []PT   []OT   []Speech   []Case Management      [] Palliative      Cardiac Monitoring Order   [x]Yes   []No     IV drips   [x]Yes    Drip:  insulin                          Dose:  Drip:                            Dose:  Drip:                            Dose:   []No     GI Prophylaxis   []Yes   []No         DVT Prophylaxis   SCDs:             Erd stockings:         [x] Medication   []Contraindicated   []None      Activity Level           Purposeful Rounding every 1-2 hour? []Yes   Jimenez Score      Bed Alarm (If score 3 or >)   []Yes   [] Refused (See signed refusal form in chart)   Joey Score      Joey Score (if score 14 or less)   []PMT consult   []Wound Care consult      []Specialty bed   [] Nutrition consult          Needs prior to discharge:   Home O2 required:    []Yes   []No    If yes, how much O2 required?     Other:    Last Bowel Movement:        Influenza Vaccine          Pneumonia Vaccine           Diet Active Orders Diet    DIET NPO With Ice Chips      LDAs               Peripheral IV 07/31/17 Right Hand (Active)   Site Assessment Clean, dry, & intact 7/31/2017 11:14 AM   Phlebitis Assessment 0 7/31/2017 11:14 AM   Infiltration Assessment 0 7/31/2017 11:14 AM   Dressing Status Clean, dry, & intact 7/31/2017 11:14 AM   Dressing Type Tape;Transparent 7/31/2017 11:14 AM       Peripheral IV 07/31/17 Left Hand (Active)   Site Assessment Clean, dry, & intact 7/31/2017 11:14 AM   Phlebitis Assessment 0 7/31/2017 11:14 AM   Infiltration Assessment 0 7/31/2017 11:14 AM   Dressing Status Clean, dry, & intact 7/31/2017 11:14 AM   Dressing Type Tape;Transparent 7/31/2017 11:14 AM                      Urinary Catheter      Intake & Output   Date 07/30/17 0700 - 07/31/17 0659(Not Admitted) 07/31/17 0700 - 08/01/17 0659   Shift 2781-0576 9099-8579 24 Hour Total 2301-2155 8945-0153 24 Hour Total   I  N  T  A  K  E   Shift Total  (mL/kg)         O  U  T  P  U  T   Urine    1025  1025      Urine Voided    1025  1025    Shift Total  (mL/kg)    1025  (9.3)  1025  (9.3)   NET    -1025  -1025   Weight (kg)    110 110 110         Readmission Risk Assessment Tool Score Low Risk            3       Total Score        3 Charlson Comorbidity Score (Age + Comorbid Conditions)        Criteria that do not apply:    Has Seen PCP in Last 6 Months (Yes=3, No=0)    . Living with Significant Other. Assisted Living. LTAC. SNF. or   Rehab    Patient Length of Stay (>5 days = 3)    IP Visits Last 12 Months (1-3=4, 4=9, >4=11)    Pt.  Coverage (Medicare=5 , Medicaid, or Self-Pay=4)       Expected Length of Stay - - -   Actual Length of Stay 0

## 2017-07-31 NOTE — ED PROVIDER NOTES
HPI Comments: Mercy Ravi is a 51 y.o. male with PMhx significant for IDDM who presents ambulatory to the ED with cc of nausea and vomiting since onset yesterday with associated symptoms of abdominal pain and SOB. Pt states his last episode of vomiting occurred shortly PTA. The pain has been constant since onset. He reports a hx of DM for which he is currently on Metformin and Glimepiride. He denies any prior hx of DKA. He denies any modifying factors. He denies any cough, congestion, rash, diarrhea, or constipation. Social Hx: - Tobacco (former), + EtOH, - Illicit Drugs    PCP: Liza Currie MD    There are no other complaints, changes or physical findings at this time. The history is provided by the patient and the spouse. No  was used. Past Medical History:   Diagnosis Date    Diabetes [facility])     ED (erectile dysfunction) 3/7/2012    Encounter for long-term (current) use of other medications 6/14/2012    Esophageal reflux 3/7/2012    IDDM (insulin dependent diabetes mellitus) ([facility] 2/13/2012    Retinopathy due to secondary diabetes ([facility] 6/15/2012    Type II or unspecified type diabetes mellitus with neurological manifestations, not stated as uncontrolled 2/13/2012       Past Surgical History:   Procedure Laterality Date    HX ORTHOPAEDIC  1988    Knee surgery. Family History:   Problem Relation Age of Onset    Diabetes Mother     Heart Disease Mother     Hypertension Mother     Heart Disease Father        Social History     Social History    Marital status:      Spouse name: N/A    Number of children: N/A    Years of education: N/A     Occupational History    Not on file.      Social History Main Topics    Smoking status: Former Smoker    Smokeless tobacco: Former User     Quit date: 5/13/1995    Alcohol use Yes      Comment: socially    Drug use: No    Sexual activity: Yes     Partners: Female     Other Topics Concern    Not on file     Social History Narrative         ALLERGIES: Review of patient's allergies indicates no known allergies. Review of Systems   Constitutional: Negative for chills, fatigue and fever. HENT: Negative for congestion and sore throat. Eyes: Negative for pain and visual disturbance. Respiratory: Positive for shortness of breath. Negative for cough. Cardiovascular: Negative for chest pain and leg swelling. Gastrointestinal: Positive for abdominal pain, nausea and vomiting. Negative for abdominal distention, blood in stool, constipation and diarrhea. Endocrine: Negative for polyuria. Genitourinary: Negative for dysuria and testicular pain. Musculoskeletal: Negative for arthralgias, joint swelling and myalgias. Skin: Negative for rash. Allergic/Immunologic: Negative for immunocompromised state. Neurological: Negative for weakness, numbness and headaches. Hematological: Does not bruise/bleed easily. Psychiatric/Behavioral: Negative for behavioral problems, confusion and dysphoric mood. Patient Vitals for the past 12 hrs:   Temp Pulse Resp BP SpO2   07/31/17 1345 - (!) 117 24 104/50 99 %   07/31/17 1300 - (!) 141 29 (!) 107/38 99 %   07/31/17 1230 - (!) 130 (!) 34 107/53 100 %   07/31/17 1200 - (!) 117 28 (!) 116/39 100 %   07/31/17 1130 - (!) 127 29 121/47 100 %   07/31/17 1122 96.2 °F (35.7 °C) - - - -   07/31/17 1100 - (!) 123 (!) 31 126/48 98 %   07/31/17 1044 97.2 °F (36.2 °C) (!) 125 (!) 34 130/43 100 %            Physical Exam   Constitutional: He is oriented to person, place, and time. He appears well-developed and well-nourished. He appears distressed (moderate). Smell of acetone on pt's breath. HENT:   Head: Normocephalic and atraumatic. Dry mucous membranes   Eyes: Conjunctivae are normal. Pupils are equal, round, and reactive to light. Right eye exhibits no discharge. Left eye exhibits no discharge. Neck: Normal range of motion. Neck supple.  No tracheal deviation present. Cardiovascular: Normal rate, regular rhythm, normal heart sounds and intact distal pulses. No murmur heard. Pulmonary/Chest: Effort normal and breath sounds normal. Tachypnea noted. No respiratory distress. He has no wheezes. He has no rales. Abdominal: Soft. Bowel sounds are normal. There is no tenderness. There is no rebound and no guarding. Musculoskeletal: Normal range of motion. He exhibits no edema, tenderness or deformity. Neurological: He is alert and oriented to person, place, and time. Skin: Skin is warm and dry. No rash noted. No erythema. Psychiatric: He has a normal mood and affect. His behavior is normal.   Nursing note and vitals reviewed. MDM  Number of Diagnoses or Management Options  Acute hyperkalemia:   Acute renal failure, unspecified acute renal failure type [facility]):   Diabetic ketoacidosis without coma associated with type 2 diabetes mellitus ([facility] [address]):   Lactic acid acidosis:   Vomiting, intractability of vomiting not specified, presence of nausea not specified, unspecified vomiting type:   Diagnosis management comments: Diabetic male appears to be in DKA. Labs show evidence of anion gap acidosis, lactic acidosis, bicarb 3. Unknown cause of DKA, will cover for occult infection, no obvious source on exam, will start insulin drip, will give calcium for hyperkalemia with EKG changes.         Amount and/or Complexity of Data Reviewed  Clinical lab tests: ordered and reviewed  Tests in the radiology section of CPT®: ordered and reviewed  Tests in the medicine section of CPT®: ordered and reviewed  Review and summarize past medical records: yes  Discuss the patient with other providers: yes (Hospitalist, pulmonology)  Independent visualization of images, tracings, or specimens: yes    Patient Progress  Patient progress: stable    CRITICAL CARE NOTE:  IMPENDING DETERIORATION -Cardiovascular, CNS, Metabolic and Renal  ASSOCIATED RISK FACTORS - Dysrhythmia, Metabolic changes and Dehydration  MANAGEMENT- Bedside Assessment and Supervision of Care  INTERPRETATION -  Blood Gases and Blood Pressure  INTERVENTIONS - hemodynamic mngmt and Metobolic interventions  CASE REVIEW - Hospitalist and Nursing  TREATMENT RESPONSE -Improved  PERFORMED BY - Self  NOTES:  I have spent 45 minutes of critical care time involved in lab review, consultations with specialist, family decision- making, bedside attention and documentation. During this entire length of time I was immediately available to the patient. Candace Gerardo DO    Procedures     EKG INTERPRETATION: 10:50 AM  Sinus tachycardia, rate 124, peaked T waves in precordial leads, normal axis, normal intervals, no ischemia. Written by Aretha Ames ED Scribe, as dictated by Candace Gerardo DO. PROGRESS NOTE:  11:24 AM  Pt's blood gas back with critical result of 6.87 pH. CONSULT NOTE:   12:30 PM  Candace Gerardo DO spoke with Dr. Kimberly Concepcion,   Specialty: Hospitalist  Discussed pt's hx, disposition, and available diagnostic and imaging results. Reviewed care plans. Consultant will evaluate pt for admission. He asks that we provide a dose of sodium bicarb. CONSULT NOTE:  12:39 PM  Candace Gerardo DO spoke with Dr. Clarita Tate,  Specialty: Pulmonology  Discussed pt's hx, disposition, and available diagnostic and imaging results. Reviewed care plans. Consultant agrees with plan for admission at this time. PROGRESS NOTE:  1:22 PM  Pt has been re-evaluated. He and wife have been updated on plan for admission. Pt currently looking improved at this time. PROGRESS NOTE:  2:16 PM  Pt has been re-evaluated. Blood pressure slightly low in the mid 59'T systolic. Pt mentating normally. Will give fourth liter of fluid. Will monitor. He may need central line if hypotension persists.      LABORATORY TESTS:  Recent Results (from the past 12 hour(s))   EKG, 12 LEAD, INITIAL    Collection Time: 07/31/17 10:49 AM   Result Value Ref Range    Ventricular Rate 124 BPM    Atrial Rate 124 BPM    P-R Interval 154 ms    QRS Duration 100 ms    Q-T Interval 330 ms    QTC Calculation (Bezet) 474 ms    Calculated P Axis 59 degrees    Calculated R Axis 13 degrees    Calculated T Axis 48 degrees    Diagnosis       Sinus tachycardia  When compared with ECG of 16-JUN-2012 23:03,  premature ventricular complexes are no longer present  T wave amplitude has increased in Anterolateral leads  Confirmed by Chris Richard ([id number]) on 7/31/2017 11:30:56 AM     GLUCOSE, POC    Collection Time: 07/31/17 10:58 AM   Result Value Ref Range    Glucose (POC) >600 (HH) 65 - 100 mg/dL    Performed by [address] [place] [address], POC    Collection Time: 07/31/17 10:59 AM   Result Value Ref Range    Glucose (POC) >600 (HH) 65 - 100 mg/dL    Performed by [facility]    POC CHEM8    Collection Time: 07/31/17 11:03 AM   Result Value Ref Range    Calcium, ionized (POC) 1.12 1.12 - 1.32 MMOL/L    Sodium (POC) 126 (L) 136 - 145 MMOL/L    Potassium (POC) 6.1 (H) 3.5 - 5.1 MMOL/L    Chloride (POC) 92 (L) 98 - 107 MMOL/L    CO2 (POC) 8 (LL) 21 - 32 MMOL/L    Anion gap (POC) 33 (H) 5 - 15 mmol/L    Glucose (POC) >700 (HH) 65 - 100 MG/DL    BUN (POC) 53 (H) 9 - 20 MG/DL    Creatinine (POC) 2.3 (H) 0.6 - 1.3 MG/DL    GFRAA, POC 37 (L) >60 ml/min/1.73m2    GFRNA, POC 31 (L) >60 ml/min/1.73m2    Hemoglobin (POC) 16.7 12.1 - 17.0 GM/DL    Hematocrit (POC) 49 36.6 - 50.3 %    Comment Comment Not Indicated.      VENOUS BLOOD GAS    Collection Time: 07/31/17 11:05 AM   Result Value Ref Range    VENOUS PH 6.87 (LL) 7.32 - 7.42      VENOUS PCO2 20 (L) 41 - 51 mmHg    VENOUS PO2 59 (H) 25 - 40 mmHg    VENOUS O2 SATURATION 68 65 - 88 %    VENOUS BICARBONATE 4 (L) 23 - 28 mmol/L    VENOUS BASE DEFICIT 28.9 mmol/L    O2 METHOD ROOM AIR      Sample source VENOUS      SITE OTHER      Critical value read back Oliver Franklin MD    CBC WITH AUTOMATED DIFF    Collection Time: 07/31/17 11:08 AM   Result Value Ref Range    WBC 31.4 (H) 4.1 - 11.1 K/uL    RBC 4.92 4.10 - 5.70 M/uL    HGB 15.4 12.1 - 17.0 g/dL    HCT 46.7 36.6 - 50.3 %    MCV 94.9 80.0 - 99.0 FL    MCH 31.3 26.0 - 34.0 PG    MCHC 33.0 30.0 - 36.5 g/dL    RDW 14.7 (H) 11.5 - 14.5 %    PLATELET 386 585 - 886 K/uL    NEUTROPHILS 73 %    BAND NEUTROPHILS 18 %    LYMPHOCYTES 8 %    MONOCYTES 0 %    EOSINOPHILS 1 %    BASOPHILS 0 %    ABS. NEUTROPHILS 28.6 K/UL    ABS. LYMPHOCYTES 2.5 K/UL    ABS. MONOCYTES 0.0 K/UL    ABS. EOSINOPHILS 0.3 K/UL    ABS. BASOPHILS 0.0 K/UL    RBC COMMENTS NORMOCYTIC, NORMOCHROMIC      DF MANUAL     LACTIC ACID    Collection Time: 07/31/17 11:08 AM   Result Value Ref Range    Lactic acid 9.8 (HH) 0.4 - 2.0 MMOL/L   GLUCOSTABILIZER    Collection Time: 07/31/17 12:25 PM   Result Value Ref Range    Glucose 601 mg/dL    Insulin order 10.8 units/hour    Insulin adminstered 10.8 units/hour    Multiplier 0.020     Low target 150 mg/dL    High target 250 mg/dL    D50 order 0.0 ml    D50 administered 0.00 ml    Minutes until next BG 60 min    Order initials MS     Administered initials MS     GLSCOM Comments         IMAGING RESULTS:  CXR Results  (Last 48 hours)               07/31/17 1058  XR CHEST PORT Final result    Narrative:  EXAM:  XR CHEST PORT       INDICATION:  Shortness of breath. COMPARISON:  Chest x-ray 2/13/2012. FINDINGS: A portable AP radiograph of the chest was obtained at 10:48 hours. The   patient is on a cardiac monitor. The lungs are clear. The cardiac and   mediastinal contours and pulmonary vascularity are normal.  The chest wall   structures and visualized upper abdomen show no acute findings with incidental   note of degenerative spine and shoulder changes. IMPRESSION: No acute findings.                      MEDICATIONS GIVEN:  Medications   sodium chloride (NS) flush 5-10 mL (not administered)   sodium chloride (NS) flush 5-10 mL (not administered)   calcium gluconate 2 g in 0.9% sodium chloride 100 mL IVPB (2 g IntraVENous New Bag 7/31/17 1214)   insulin regular (NOVOLIN R, HUMULIN R) 100 Units in 0.9% sodium chloride 100 mL infusion (10.8 Units/hr IntraVENous New Bag 7/31/17 1225)   glucose chewable tablet 16 g (not administered)   dextrose (D50W) injection syrg 12.5-25 g (not administered)   glucagon (GLUCAGEN) injection 1 mg (not administered)   0.9% sodium chloride infusion (250 mL/hr IntraVENous New Bag 7/31/17 1233)   0.9% sodium chloride infusion 1,000 mL (1,000 mL IntraVENous New Bag 7/31/17 1219)   piperacillin-tazobactam (ZOSYN) 4.5 g in 0.9% sodium chloride (MBP/ADV) 100 mL (not administered)   vancomycin (VANCOCIN) 2,750 mg in 0.9% sodium chloride 500 mL IVPB (not administered)   sodium bicarbonate 8.4 % (1 mEq/mL) injection 50 mEq (not administered)   0.9% sodium chloride infusion 2,000 mL (2,000 mL IntraVENous New Bag 7/31/17 1122)       IMPRESSION:  1. Diabetic ketoacidosis without coma associated with type 2 diabetes mellitus ([address]    2. Acute hyperkalemia    3. Lactic acid acidosis    4. Vomiting, intractability of vomiting not specified, presence of nausea not specified, unspecified vomiting type    5. Acute renal failure, unspecified acute renal failure type ([address]        PLAN:  1. Admit to Hospitalist, ICU    Admit Note:  12:30 PM  Patient is being admitted to the hospital by Dr. Rachel Arriaga. The results of their tests and reasons for their admission have been discussed with them and/or available family. They convey agreement and understanding for the need to be admitted and for their admission diagnosis. Consultation has been made with the inpatient physician specialist for hospitalization. Attestation: This note is prepared by Leonie Sher, acting as Scribe for Judson Chaney DO. Judson Chaney DO: The scribe's documentation has been prepared under my direction and personally reviewed by me in its entirety.  I confirm that the note above accurately reflects all work, treatment, procedures, and medical decision making performed by me.

## 2017-07-31 NOTE — ED NOTES
Pt resting in bed, states he is feeling a little better at this time and has less labored breathing.

## 2017-07-31 NOTE — ED NOTES
Assumed care of patient. Patient is alert and oriented, does not appear to be in distress. Patient ambulatory to ED with c/o n/v, labored breathing, generalized weakness and hyperglycemia.

## 2017-07-31 NOTE — IP AVS SNAPSHOT
[address] [facility] 
[phone] Patient: Paulino Arthur MRN: [id number] :1970 You are allergic to the following No active allergies Recent Documentation Weight BMI Smoking Status 110 kg 33.82 kg/m2 Former Smoker Unresulted Labs Order Current Status GLUTAMIC ACID DECARB AB In process INSULIN AB In process CULTURE, BLOOD, PAIRED Preliminary result Emergency Contacts Name Discharge Info Relation Home Work Mobile Jodi Ash  Spouse [3] [phone] About your hospitalization You were admitted on:  2017 You last received care in the:  [facility] 3 RENAL MEDICINE You were discharged on:  August 3, 2017 Unit phone number:  [phone] Why you were hospitalized Your primary diagnosis was:  Not on File Your diagnoses also included:  Dka (Diabetic Ketoacidoses) (Hcc), Type 2 Diabetes Mellitus With Ophthalmic Complication, Without Long-Term Current Use Of Insulin (Hcc), Acute Renal Failure With Tubular Necrosis (Hcc), Projectile Vomiting With Nausea Providers Seen During Your Hospitalizations Provider Role Specialty Primary office phone Lisa Hernandez DO Attending Provider Emergency Medicine [phone] Arabella Foster MD Attending Provider [facility] [phone] Your Primary Care Physician (PCP) Primary Care Physician Office Phone Office Fax Clint Sen [phone] Follow-up Information Follow up With Details Comments Contact Info Arabella Foster MD On 2017 11:15am  hospital follow-up [address] [place] [address] 7 [address] [phone] Your Appointments 2017 11:15 AM EDT TRANSITIONAL CARE MANAGEMENT with Arabella Foster MD  
 [facility] [address] [facility] [place] [id number] [phone] Current Discharge Medication List  
  
CONTINUE these medications which have CHANGED Dose & Instructions Dispensing Information Comments Morning Noon Evening Bedtime  
 glucose blood VI test strips strip Commonly known as:  ACCU-CHEK ADVANTAGE TEST What changed:  Another medication with the same name was removed. Continue taking this medication, and follow the directions you see here. Your last dose was: Your next dose is:    
   
   
 by Does Not Apply route four (4) times daily. 4 TIMES DAILY Quantity:  100 Strip Refills:  11  
     
   
   
   
  
 insulin NPH/insulin regular 100 unit/mL (70-30) injection Commonly known as:  NOVOLIN 70/30, HUMULIN 70/30 What changed:   
- medication strength 
- how to take this 
- additional instructions Your last dose was: Your next dose is:    
   
   
 14 units q am,40 units q pm  
 Quantity:  20 mL Refills:  11 CONTINUE these medications which have NOT CHANGED Dose & Instructions Dispensing Information Comments Morning Noon Evening Bedtime * Blood-Glucose Meter monitoring kit Your last dose was: Your next dose is:    
   
   
 4 TIMESDAILY Quantity:  1 Kit Refills:  0  
     
   
   
   
  
 * Blood-Glucose Meter monitoring kit Your last dose was: Your next dose is: As directed Quantity:  1 Kit Refills:  0  
     
   
   
   
  
 glimepiride 4 mg tablet Commonly known as:  AMARYL Your last dose was: Your next dose is:    
   
   
 Dose:  4 mg Take 1 Tab by mouth every morning. Quantity:  30 Tab Refills:  11  
     
   
   
   
  
 metFORMIN  mg tablet Commonly known as:  GLUCOPHAGE XR Your last dose was:     
   
Your next dose is:    
   
   
 Dose:  750 mg  
 Take 1 Tab by mouth daily. Quantity:  60 Tab Refills:  11  
     
   
   
   
  
 vardenafil 20 mg tablet Commonly known as:  LEVITRA Your last dose was: Your next dose is:    
   
   
 Dose:  20 mg Take 20 mg by mouth as needed. Quantity:  3 Tab Refills:  5  
     
   
   
   
  
 * Notice: This list has 2 medication(s) that are the same as other medications prescribed for you. Read the directions carefully, and ask your doctor or other care provider to review them with you. STOP taking these medications ACTOS 45 mg tablet Generic drug:  pioglitazone  
   
  
 terbinafine HCl 250 mg tablet Commonly known as:  LAMISIL Where to Get Your Medications Information on where to get these meds will be given to you by the nurse or doctor. ! Ask your nurse or doctor about these medications  
  insulin NPH/insulin regular 100 unit/mL (70-30) injection Discharge Instructions PATIENT DISCHARGE INSTRUCTIONS PATIENT DISCHARGE INSTRUCTIONS Holly Espino / [id number] : 1970 Admitted 2017 Discharged: 8/3/2017 · It is important that you take the medication exactly as they are prescribed. · Keep your medication in the bottles provided by the pharmacist and keep a list of the medication names, dosages, and times to be taken in your wallet. · Do not take other medications without consulting your doctor. What to do at [facility] Recommended Diet: Diabetic Diet Recommended Activity: Activity as tolerated If you experience any of the following symptoms Nausea,Vomiting,Fequent Urination, please follow up with Daniel Cooper MD 
 
 
 
 
 
Signed By: Daniel Cooper MD   
 August 3, 2017 Discharge Orders None [facility] Announcement  We are excited to announce that we are making your provider's discharge notes available to you in Thumbs Up. You will see these notes when they are completed and signed by the physician that discharged you from your recent hospital stay. If you have any questions or concerns about any information you see in Thumbs Up, please call the Health Information Department where you were seen or reach out to your Primary Care Provider for more information about your plan of care. Introducing [facility]! Kylahiwona Mayorga introduces Thumbs Up patient portal. Now you can access parts of your medical record, email your doctor's office, and request medication refills online. 1. In your internet browser, go to [email or web address] 2. Click on the First Time User? Click Here link in the Sign In box. You will see the New Member Sign Up page. 3. Enter your Thumbs Up Access Code exactly as it appears below. You will not need to use this code after youve completed the sign-up process. If you do not sign up before the expiration date, you must request a new code. · Thumbs Up Access Code: [email or web address] Expires: 9/5/2017  4:25 PM 
 
4. Enter the last four digits of your Social Security Number (xxxx) and Date of Birth (mm/dd/yyyy) as indicated and click Submit. You will be taken to the next sign-up page. 5. Create a Thumbs Up ID. This will be your Thumbs Up login ID and cannot be changed, so think of one that is secure and easy to remember. 6. Create a Thumbs Up password. You can change your password at any time. 7. Enter your Password Reset Question and Answer. This can be used at a later time if you forget your password. 8. Enter your e-mail address. You will receive e-mail notification when new information is available in [address] 9. Click Sign Up. You can now view and download portions of your medical record. 10. Click the Download Summary menu link to download a portable copy of your medical information. If you have questions, please visit the Frequently Asked Questions section of the MyChart website. Remember, MyChart is NOT to be used for urgent needs. For medical emergencies, dial 911. Now available from your iPhone and Android! General Information Please provide this summary of care documentation to your next provider. Patient Signature:  ____________________________________________________________ Date:  ____________________________________________________________  
  
[facility] Provider Signature:  ____________________________________________________________ Date:  ____________________________________________________________

## 2017-07-31 NOTE — H&P
Hospitalist Admission Note    NAME: Parviz Haile   :  1970   MRN:  [id number]     Date/Time:  2017 3:27 PM    Patient PCP: Ge Velazquez MD  ________________________________________________________________________    My assessment of this patient's clinical condition and my plan of care is as follows. Assessment / Plan:    DKA  -patient has been on insulin since he was diagnosed at age 28. Last month it was decided to try him on oral medications and his 70/30 was discontinued. He believes that he is a Type 2 but he has never been on oral meds since the year he was diagnosed (failed oral meds initially)  -admit to PCU for close monitoring. Continue Q1 hour BG checks  -continue insulin drip and titrate per protocol  -follow BMP, Mg and Phos q 4hours  -allow ice chips for now and advance slowly as he improves . His nausea vomiting and abdominal pain are all related to his severe DKA    SIRS  -UA and CXR negative. Blood cultures sent. Given Vanco and Zosyn in ER. He has no fever and was clinically well 48 hours ago. Will hold on Abx and repeat CBC in AM.  Follow clinically. BIANCA  Volume depletion  Hypercalcemia due to dehydration  Lactic acidosis, suspect due to dehydration  -continue aggressive volume expansion. Repeat labs and lactic acid      Code Status:   Full  Surrogate Decision Maker:  wife    DVT Prophylaxis:  lovenox    I have provided   55   minutes of critical care time. During this entire length of time I was immediately available to the patient. The reason for providing this level of medical care was due to a critical illness that impaired one or more vital organ systems, such that there was a high probability of imminent or life threatening deterioration in the patient's condition.  This care involved high complexity decision making which includes reviewing the patient's past medical records, current laboratory results, and actual Xray films in order to assess, support vital system function, and to treat this degree of vital organ system failure, and to prevent further life threatening deterioration of the patients condition. Subjective:   CHIEF COMPLAINT:   Nausea vomiting    HISTORY OF PRESENT ILLNESS:     Oz Mckenzie is a 51 y.o.  male with a long history of DM who presents in DKA. Patient was diagnosed with diabetes about 20 years ago. He recalls failing oral meds and he has been on insulin ever since. He had a follow up 6 weeks ago with his PCP and his A1c 7.4. He was transitioned from insulin to oral meds (glimepiride, metformin and pioglitazone). He missed his follow up appointment 2 weeks ago and he reports running out of pioglitazone. Yesterday he experienced nausea, vomiting and anterior abdominal pain. Pain remained constant with no radiation. He did not eat much but continued to take his medications. He has not been checking his blood sugars since the switch to pills and he endorses polyuria, polydipsia, blurred vision and weakness. He presents to the ER today and found to have an elevated BG, AG and lactic acid level. We were asked to admit for work up and evaluation of the above problems. Past Medical History:   Diagnosis Date    Diabetes [facility])     ED (erectile dysfunction) 3/7/2012    Encounter for long-term (current) use of other medications 6/14/2012    Esophageal reflux 3/7/2012    IDDM (insulin dependent diabetes mellitus) ([facility] [address] 2/13/2012    Retinopathy due to secondary diabetes ([facility] [address] 6/15/2012    Type II or unspecified type diabetes mellitus with neurological manifestations, not stated as uncontrolled 2/13/2012        Past Surgical History:   Procedure Laterality Date    HX ORTHOPAEDIC  1988    Knee surgery.        Social History   Substance Use Topics    Smoking status: Former Smoker    Smokeless tobacco: Former User     Quit date: 5/13/1995    Alcohol use Yes Comment: socially        Family History   Problem Relation Age of Onset    Diabetes Mother     Heart Disease Mother     Hypertension Mother     Heart Disease Father      No Known Allergies     Prior to Admission medications    Medication Sig Start Date End Date Taking? Authorizing Provider   pioglitazone (ACTOS) 45 mg tablet Take  by mouth daily. Historical Provider   glimepiride (AMARYL) 4 mg tablet Take 1 Tab by mouth every morning. 6/7/17   Danielle Sy, MD   metFORMIN ER (GLUCOPHAGE XR) 750 mg tablet Take 1 Tab by mouth daily. 6/7/17   Danielle Sy, MD   terbinafine HCl (LAMISIL) 250 mg tablet Take 1 Tab by mouth daily. 6/7/17   Danielle Daily, MD   glucose blood VI test strips (BLOOD GLUCOSE TEST) strip by Does Not Apply route Daily (before breakfast). 6/7/17   Danielle Daily, MD   vardenafil (LEVITRA) 20 mg tablet Take 20 mg by mouth as needed. 6/7/17   Danielle Daily, MD   Blood-Glucose Meter monitoring kit As directed 12/9/13   Danielle Daily, MD   glucose blood VI test strips (BLOOD GLUCOSE TEST) strip Twice daily as needed 12/9/13   Danielle Daily, MD   Blood-Glucose Meter monitoring kit [address] [place] [address] 7/27/12   Danielle Daily, MD   glucose blood VI test strips (ACCU-CHEK ADVANTAGE TEST) strip by Does Not Apply route four (4) times daily. [address]   7/27/12   Danielle Daily, MD   glucose blood VI test strips (FREESTYLE TEST) strip Twice daily 7/16/12   Danielle Daily, MD   HUM INSULIN NPH/REG INSULIN HM (HUMULIN 70/30 SC) by SubCUTAneous route.       Historical Provider       REVIEW OF SYSTEMS:       Total of 12 systems reviewed as follows:       POSITIVE= underlined text  Negative = text not underlined  General:  fever, chills, sweats, generalized weakness, weight loss/gain,      loss of appetite   Eyes:    blurred vision, eye pain, loss of vision, double vision  ENT:    rhinorrhea, pharyngitis   Respiratory:   cough, sputum production, SOB, MAX, wheezing, pleuritic pain   Cardiology:   chest pain, palpitations, orthopnea, PND, edema, syncope   Gastrointestinal:  abdominal pain , N/V, diarrhea, dysphagia, constipation, bleeding   Genitourinary:  frequency, urgency, dysuria, hematuria, incontinence   Muskuloskeletal :  arthralgia, myalgia, back pain  Hematology:  easy bruising, nose or gum bleeding, lymphadenopathy   Dermatological: rash, ulceration, pruritis, color change / jaundice  Endocrine:   hot flashes or polydipsia   Neurological:  headache, dizziness, confusion, focal weakness, paresthesia,     Speech difficulties, memory loss, gait difficulty  Psychological: Feelings of anxiety, depression, agitation    Objective:   VITALS:    Visit Vitals    /50    Pulse (!) 117    Temp 96.2 °F (35.7 °C)    Resp 24    Wt 110 kg (242 lb 8.1 oz)    SpO2 99%    BMI 33.82 kg/m2       PHYSICAL EXAM:    General:    Alert, cooperative, ill appearing in mild distress   HEENT: Atraumatic, anicteric sclerae, pink conjunctivae     No oral ulcers, mucosa moist, throat clear, dentition fair  Neck:  Supple, symmetrical,  thyroid: non tender  Lungs:   Clear to auscultation bilaterally. No Wheezing or Rhonchi. No rales. Chest wall:  No tenderness  No Accessory muscle use. Heart:   Regular  rhythm,  No  murmur   No edema  Abdomen:   Soft, non-tender. Not distended. Bowel sounds normal  Extremities: No cyanosis. No clubbing, Skin turgor decreased, Capillary refill borderline normal, Radial dial pulse 2+  Skin:     Not pale. Not Jaundiced  No rashes   Psych:  Fair insight. Not depressed. Not anxious or agitated. Neurologic: EOMs intact. No facial asymmetry. No aphasia or slurred speech. Symmetrical strength, Sensation grossly intact. Arouses to voice and touch. Recognize wife.   Follows commands    _______________________________________________________________________  Care Plan discussed with:    Comments   Patient x    Family  x wife   RN     Care Manager                    Consultant:      _______________________________________________________________________  Expected  Disposition:   Home with Family x   HH/PT/OT/RN    SNF/LTC    FRIDA    ________________________________________________________________________  TOTAL TIME:    Minutes    Critical Care Provided  54    Minutes non procedure based      Comments    x Reviewed previous records   >50% of visit spent in counseling and coordination of care  Discussion with patient and/or family and questions answered       Given the patient's current clinical presentation, I have a high level of concern for decompensation if discharged from the ED. Complex decision making was performed which includes reviewing the patient's available past medical records, laboratory results, and Xray films. I have also directly communicated my plan and discussed this case with the involved ED physician.     ____________________________________________________________________  Colby Jackson MD    Procedures: see electronic medical records for all procedures/Xrays and details which were not copied into this note but were reviewed prior to creation of Plan.     LAB DATA REVIEWED:    Recent Results (from the past 24 hour(s))   EKG, 12 LEAD, INITIAL    Collection Time: 07/31/17 10:49 AM   Result Value Ref Range    Ventricular Rate 124 BPM    Atrial Rate 124 BPM    P-R Interval 154 ms    QRS Duration 100 ms    Q-T Interval 330 ms    QTC Calculation (Bezet) 474 ms    Calculated P Axis 59 degrees    Calculated R Axis 13 degrees    Calculated T Axis 48 degrees    Diagnosis       Sinus tachycardia  When compared with ECG of 16-JUN-2012 23:03,  premature ventricular complexes are no longer present  T wave amplitude has increased in Anterolateral leads  Confirmed by [address] [place] [address] ([id number]) on 7/31/2017 11:30:56 AM     GLUCOSE, POC    Collection Time: 07/31/17 10:58 AM   Result Value Ref Range    Glucose (POC) >600 (HH) 65 - 100 mg/dL    Performed by [address] [place] [address], POC    Collection Time: 07/31/17 10:59 AM   Result Value Ref Range    Glucose (POC) >600 (HH) 65 - 100 mg/dL    Performed by [facility]    POC CHEM8    Collection Time: 07/31/17 11:03 AM   Result Value Ref Range    Calcium, ionized (POC) 1.12 1.12 - 1.32 MMOL/L    Sodium (POC) 126 (L) 136 - 145 MMOL/L    Potassium (POC) 6.1 (H) 3.5 - 5.1 MMOL/L    Chloride (POC) 92 (L) 98 - 107 MMOL/L    CO2 (POC) 8 (LL) 21 - 32 MMOL/L    Anion gap (POC) 33 (H) 5 - 15 mmol/L    Glucose (POC) >700 (HH) 65 - 100 MG/DL    BUN (POC) 53 (H) 9 - 20 MG/DL    Creatinine (POC) 2.3 (H) 0.6 - 1.3 MG/DL    GFRAA, POC 37 (L) >60 ml/min/1.73m2    GFRNA, POC 31 (L) >60 ml/min/1.73m2    Hemoglobin (POC) 16.7 12.1 - 17.0 GM/DL    Hematocrit (POC) 49 36.6 - 50.3 %    Comment Comment Not Indicated. VENOUS BLOOD GAS    Collection Time: 07/31/17 11:05 AM   Result Value Ref Range    VENOUS PH 6.87 (LL) 7.32 - 7.42      VENOUS PCO2 20 (L) 41 - 51 mmHg    VENOUS PO2 59 (H) 25 - 40 mmHg    VENOUS O2 SATURATION 68 65 - 88 %    VENOUS BICARBONATE 4 (L) 23 - 28 mmol/L    VENOUS BASE DEFICIT 28.9 mmol/L    O2 METHOD ROOM AIR      Sample source VENOUS      SITE OTHER      Critical value read back Alicia Gongora MD    CBC WITH AUTOMATED DIFF    Collection Time: 07/31/17 11:08 AM   Result Value Ref Range    WBC 31.4 (H) 4.1 - 11.1 K/uL    RBC 4.92 4.10 - 5.70 M/uL    HGB 15.4 12.1 - 17.0 g/dL    HCT 46.7 36.6 - 50.3 %    MCV 94.9 80.0 - 99.0 FL    MCH 31.3 26.0 - 34.0 PG    MCHC 33.0 30.0 - 36.5 g/dL    RDW 14.7 (H) 11.5 - 14.5 %    PLATELET 265 387 - 524 K/uL    NEUTROPHILS 73 %    BAND NEUTROPHILS 18 %    LYMPHOCYTES 8 %    MONOCYTES 0 %    EOSINOPHILS 1 %    BASOPHILS 0 %    ABS. NEUTROPHILS 28.6 K/UL    ABS. LYMPHOCYTES 2.5 K/UL    ABS. MONOCYTES 0.0 K/UL    ABS. EOSINOPHILS 0.3 K/UL    ABS.  BASOPHILS 0.0 K/UL    RBC COMMENTS NORMOCYTIC, NORMOCHROMIC      DF MANUAL     LACTIC ACID    Collection Time: 07/31/17 11:08 AM   Result Value Ref Range    Lactic acid 9.8 (HH) 0.4 - 2.0 MMOL/L   GLUCOSTABILIZER    Collection Time: 07/31/17 12:25 PM   Result Value Ref Range    Glucose 601 mg/dL    Insulin order 10.8 units/hour    Insulin adminstered 10.8 units/hour    Multiplier 0.020     Low target 150 mg/dL    High target 250 mg/dL    D50 order 0.0 ml    D50 administered 0.00 ml    Minutes until next BG 60 min    Order initials MS     Administered initials MS     GLSCOM Comments     MAGNESIUM    Collection Time: 07/31/17 12:26 PM   Result Value Ref Range    Magnesium 1.5 (L) 1.6 - 2.4 mg/dL   METABOLIC PANEL, BASIC    Collection Time: 07/31/17 12:26 PM   Result Value Ref Range    Sodium 139 136 - 145 mmol/L    Potassium 3.7 3.5 - 5.1 mmol/L    Chloride 108 97 - 108 mmol/L    CO2 <5 (LL) 21 - 32 mmol/L    Anion gap Cannot be calulated 5 - 15 mmol/L    Glucose 765 (HH) 65 - 100 mg/dL    BUN 31 (H) 6 - 20 MG/DL    Creatinine 1.80 (H) 0.70 - 1.30 MG/DL    BUN/Creatinine ratio 17 12 - 20      GFR est AA 49 (L) >60 ml/min/1.73m2    GFR est non-AA 41 (L) >60 ml/min/1.73m2    Calcium 12.9 (H) 8.5 - 10.1 MG/DL   PHOSPHORUS    Collection Time: 07/31/17 12:26 PM   Result Value Ref Range    Phosphorus 6.1 (H) 2.6 - 4.7 MG/DL   GLUCOSE, POC    Collection Time: 07/31/17  1:37 PM   Result Value Ref Range    Glucose (POC) >600 (HH) 65 - 100 mg/dL    Performed by Saturnino Izquierdo    GLUCOSE, POC    Collection Time: 07/31/17  1:41 PM   Result Value Ref Range    Glucose (POC) >600 (HH) 65 - 100 mg/dL    Performed by Divine Alicia    Collection Time: 07/31/17  1:41 PM   Result Value Ref Range    Glucose 601 mg/dL    Insulin order 16.2 units/hour    Insulin adminstered 16.2 units/hour    Multiplier 0.030     Low target 150 mg/dL    High target 250 mg/dL    D50 order 0.0 ml    D50 administered 0.00 ml    Minutes until next BG 60 min    Order initials MS     Administered initials MS     GLSCOM Comments     URINALYSIS W/MICROSCOPIC    Collection Time: 07/31/17  1:56 PM   Result Value Ref Range    Color YELLOW/STRAW      Appearance CLOUDY (A) CLEAR      Specific gravity 1.025 1.003 - 1.030      pH (UA) 5.0 5.0 - 8.0      Protein 30 (A) NEG mg/dL    Glucose >1000 (A) NEG mg/dL    Ketone 40 (A) NEG mg/dL    Bilirubin NEGATIVE  NEG      Blood SMALL (A) NEG      Urobilinogen 0.2 0.2 - 1.0 EU/dL    Nitrites NEGATIVE  NEG      Leukocyte Esterase NEGATIVE  NEG      WBC 0-4 0 - 4 /hpf    RBC 5-10 0 - 5 /hpf    Epithelial cells FEW FEW /lpf    Bacteria NEGATIVE  NEG /hpf    Hyaline cast 2-5 0 - 5 /lpf   GLUCOSE, POC    Collection Time: 07/31/17  3:10 PM   Result Value Ref Range    Glucose (POC) >600 (HH) 65 - 100 mg/dL    Performed by Ant Sheehan    GLUCOSE, POC    Collection Time: 07/31/17  3:13 PM   Result Value Ref Range    Glucose (POC) >600 (HH) 65 - 100 mg/dL    Performed by Blu Pitts    Collection Time: 07/31/17  3:25 PM   Result Value Ref Range    Glucose 601 mg/dL    Insulin order 21.6 units/hour    Insulin adminstered 21.6 units/hour    Multiplier 0.040     Low target 150 mg/dL    High target 250 mg/dL    D50 order 0.0 ml    D50 administered 0.00 ml    Minutes until next BG 60 min    Order initials MS     Administered initials MS     GLSCOM Comments

## 2017-07-31 NOTE — ED NOTES
TRANSFER - OUT REPORT:    Verbal report given to Juaquin Alegria RN (name) on Leonel Ormond  being transferred to PCU (unit) for routine progression of care       Report consisted of patients Situation, Background, Assessment and   Recommendations(SBAR). Information from the following report(s) SBAR, Kardex, ED Summary, Intake/Output, MAR, Recent Results and Med Rec Status was reviewed with the receiving nurse. Lines:   Peripheral IV 07/31/17 Right Hand (Active)   Site Assessment Clean, dry, & intact 7/31/2017 11:14 AM   Phlebitis Assessment 0 7/31/2017 11:14 AM   Infiltration Assessment 0 7/31/2017 11:14 AM   Dressing Status Clean, dry, & intact 7/31/2017 11:14 AM   Dressing Type Tape;Transparent 7/31/2017 11:14 AM       Peripheral IV 07/31/17 Left Hand (Active)   Site Assessment Clean, dry, & intact 7/31/2017 11:14 AM   Phlebitis Assessment 0 7/31/2017 11:14 AM   Infiltration Assessment 0 7/31/2017 11:14 AM   Dressing Status Clean, dry, & intact 7/31/2017 11:14 AM   Dressing Type Tape;Transparent 7/31/2017 11:14 AM        Opportunity for questions and clarification was provided.       Patient transported with:   Registered Nurse

## 2017-07-31 NOTE — IP AVS SNAPSHOT
[address] [facility] 
[phone] Patient: Chavez Renner MRN: [id number] :1970 Current Discharge Medication List  
  
CONTINUE these medications which have CHANGED Dose & Instructions Dispensing Information Comments Morning Noon Evening Bedtime  
 glucose blood VI test strips strip Commonly known as:  ACCU-CHEK ADVANTAGE TEST What changed:  Another medication with the same name was removed. Continue taking this medication, and follow the directions you see here. Your last dose was: Your next dose is:    
   
   
 by Does Not Apply route four (4) times daily. 4 TIMES DAILY Quantity:  100 Strip Refills:  11  
     
   
   
   
  
 insulin NPH/insulin regular 100 unit/mL (70-30) injection Commonly known as:  NOVOLIN 70/30, HUMULIN 70/30 What changed:   
- medication strength 
- how to take this 
- additional instructions Your last dose was: Your next dose is:    
   
   
 14 units q am,40 units q pm  
 Quantity:  20 mL Refills:  11 CONTINUE these medications which have NOT CHANGED Dose & Instructions Dispensing Information Comments Morning Noon Evening Bedtime * Blood-Glucose Meter monitoring kit Your last dose was: Your next dose is:    
   
   
 4 TIMESDAILY Quantity:  1 Kit Refills:  0  
     
   
   
   
  
 * Blood-Glucose Meter monitoring kit Your last dose was: Your next dose is: As directed Quantity:  1 Kit Refills:  0  
     
   
   
   
  
 glimepiride 4 mg tablet Commonly known as:  AMARYL Your last dose was: Your next dose is:    
   
   
 Dose:  4 mg Take 1 Tab by mouth every morning. Quantity:  30 Tab Refills:  11  
     
   
   
   
  
 metFORMIN  mg tablet Commonly known as:  GLUCOPHAGE XR Your last dose was: Your next dose is:    
   
   
 Dose:  750 mg Take 1 Tab by mouth daily. Quantity:  60 Tab Refills:  11  
     
   
   
   
  
 vardenafil 20 mg tablet Commonly known as:  LEVITRA Your last dose was: Your next dose is:    
   
   
 Dose:  20 mg Take 20 mg by mouth as needed. Quantity:  3 Tab Refills:  5  
     
   
   
   
  
 * Notice: This list has 2 medication(s) that are the same as other medications prescribed for you. Read the directions carefully, and ask your doctor or other care provider to review them with you. STOP taking these medications ACTOS 45 mg tablet Generic drug:  pioglitazone  
   
  
 terbinafine HCl 250 mg tablet Commonly known as:  LAMISIL Where to Get Your Medications Information on where to get these meds will be given to you by the nurse or doctor. ! Ask your nurse or doctor about these medications  
  insulin NPH/insulin regular 100 unit/mL (70-30) injection

## 2017-08-01 ENCOUNTER — TELEPHONE (OUTPATIENT)
Dept: ENDOCRINOLOGY | Age: 47
End: 2017-08-01

## 2017-08-01 PROBLEM — E11.39 TYPE 2 DIABETES MELLITUS WITH OPHTHALMIC COMPLICATION, WITHOUT LONG-TERM CURRENT USE OF INSULIN (HCC): Status: ACTIVE | Noted: 2017-08-01

## 2017-08-01 PROBLEM — N17.0 ACUTE RENAL FAILURE WITH TUBULAR NECROSIS (HCC): Status: ACTIVE | Noted: 2017-08-01

## 2017-08-01 LAB
ADMINISTERED INITIALS, ADMINIT: NORMAL
ALBUMIN SERPL BCP-MCNC: 2.7 G/DL (ref 3.5–5)
ALBUMIN/GLOB SERPL: 1.1 {RATIO} (ref 1.1–2.2)
ALP SERPL-CCNC: 85 U/L (ref 45–117)
ALT SERPL-CCNC: 49 U/L (ref 12–78)
ANION GAP BLD CALC-SCNC: 12 MMOL/L (ref 5–15)
ANION GAP BLD CALC-SCNC: 7 MMOL/L (ref 5–15)
AST SERPL W P-5'-P-CCNC: 27 U/L (ref 15–37)
BASOPHILS # BLD AUTO: 0 K/UL (ref 0–0.1)
BASOPHILS # BLD: 0 % (ref 0–1)
BILIRUB SERPL-MCNC: 0.4 MG/DL (ref 0.2–1)
BUN SERPL-MCNC: 27 MG/DL (ref 6–20)
BUN SERPL-MCNC: 39 MG/DL (ref 6–20)
BUN/CREAT SERPL: 14 (ref 12–20)
BUN/CREAT SERPL: 16 (ref 12–20)
CALCIUM SERPL-MCNC: 7.2 MG/DL (ref 8.5–10.1)
CALCIUM SERPL-MCNC: 7.9 MG/DL (ref 8.5–10.1)
CHLORIDE SERPL-SCNC: 113 MMOL/L (ref 97–108)
CHLORIDE SERPL-SCNC: 116 MMOL/L (ref 97–108)
CHOLEST SERPL-MCNC: 131 MG/DL
CO2 SERPL-SCNC: 21 MMOL/L (ref 21–32)
CO2 SERPL-SCNC: 25 MMOL/L (ref 21–32)
CREAT SERPL-MCNC: 1.93 MG/DL (ref 0.7–1.3)
CREAT SERPL-MCNC: 2.48 MG/DL (ref 0.7–1.3)
D50 ADMINISTERED, D50ADM: 0 ML
D50 ORDER, D50ORD: 0 ML
EOSINOPHIL # BLD: 0 K/UL (ref 0–0.4)
EOSINOPHIL NFR BLD: 0 % (ref 0–7)
ERYTHROCYTE [DISTWIDTH] IN BLOOD BY AUTOMATED COUNT: 14.4 % (ref 11.5–14.5)
GLOBULIN SER CALC-MCNC: 2.5 G/DL (ref 2–4)
GLSCOM COMMENTS: NORMAL
GLUCOSE BLD STRIP.AUTO-MCNC: 125 MG/DL (ref 65–100)
GLUCOSE BLD STRIP.AUTO-MCNC: 125 MG/DL (ref 65–100)
GLUCOSE BLD STRIP.AUTO-MCNC: 131 MG/DL (ref 65–100)
GLUCOSE BLD STRIP.AUTO-MCNC: 135 MG/DL (ref 65–100)
GLUCOSE BLD STRIP.AUTO-MCNC: 147 MG/DL (ref 65–100)
GLUCOSE BLD STRIP.AUTO-MCNC: 147 MG/DL (ref 65–100)
GLUCOSE BLD STRIP.AUTO-MCNC: 154 MG/DL (ref 65–100)
GLUCOSE BLD STRIP.AUTO-MCNC: 154 MG/DL (ref 65–100)
GLUCOSE BLD STRIP.AUTO-MCNC: 158 MG/DL (ref 65–100)
GLUCOSE BLD STRIP.AUTO-MCNC: 161 MG/DL (ref 65–100)
GLUCOSE BLD STRIP.AUTO-MCNC: 172 MG/DL (ref 65–100)
GLUCOSE BLD STRIP.AUTO-MCNC: 175 MG/DL (ref 65–100)
GLUCOSE BLD STRIP.AUTO-MCNC: 187 MG/DL (ref 65–100)
GLUCOSE BLD STRIP.AUTO-MCNC: 199 MG/DL (ref 65–100)
GLUCOSE BLD STRIP.AUTO-MCNC: 210 MG/DL (ref 65–100)
GLUCOSE BLD STRIP.AUTO-MCNC: 240 MG/DL (ref 65–100)
GLUCOSE BLD STRIP.AUTO-MCNC: 260 MG/DL (ref 65–100)
GLUCOSE BLD STRIP.AUTO-MCNC: 262 MG/DL (ref 65–100)
GLUCOSE SERPL-MCNC: 293 MG/DL (ref 65–100)
GLUCOSE SERPL-MCNC: 353 MG/DL (ref 65–100)
GLUCOSE, GLC: 125 MG/DL
GLUCOSE, GLC: 125 MG/DL
GLUCOSE, GLC: 131 MG/DL
GLUCOSE, GLC: 135 MG/DL
GLUCOSE, GLC: 147 MG/DL
GLUCOSE, GLC: 147 MG/DL
GLUCOSE, GLC: 154 MG/DL
GLUCOSE, GLC: 154 MG/DL
GLUCOSE, GLC: 158 MG/DL
GLUCOSE, GLC: 161 MG/DL
GLUCOSE, GLC: 172 MG/DL
GLUCOSE, GLC: 175 MG/DL
GLUCOSE, GLC: 199 MG/DL
GLUCOSE, GLC: 210 MG/DL
GLUCOSE, GLC: 240 MG/DL
GLUCOSE, GLC: 262 MG/DL
HCT VFR BLD AUTO: 34.5 % (ref 36.6–50.3)
HDLC SERPL-MCNC: 45 MG/DL
HDLC SERPL: 2.9 {RATIO} (ref 0–5)
HGB BLD-MCNC: 12.5 G/DL (ref 12.1–17)
HIGH TARGET, HITG: 250 MG/DL
INSULIN ADMINSTERED, INSADM: 1.9 UNITS/HOUR
INSULIN ADMINSTERED, INSADM: 16.5 UNITS/HOUR
INSULIN ADMINSTERED, INSADM: 19.8 UNITS/HOUR
INSULIN ADMINSTERED, INSADM: 2.5 UNITS/HOUR
INSULIN ADMINSTERED, INSADM: 2.8 UNITS/HOUR
INSULIN ADMINSTERED, INSADM: 2.9 UNITS/HOUR
INSULIN ADMINSTERED, INSADM: 2.9 UNITS/HOUR
INSULIN ADMINSTERED, INSADM: 22.2 UNITS/HOUR
INSULIN ADMINSTERED, INSADM: 3 UNITS/HOUR
INSULIN ADMINSTERED, INSADM: 3.5 UNITS/HOUR
INSULIN ADMINSTERED, INSADM: 3.6 UNITS/HOUR
INSULIN ADMINSTERED, INSADM: 4.2 UNITS/HOUR
INSULIN ADMINSTERED, INSADM: 6.1 UNITS/HOUR
INSULIN ADMINSTERED, INSADM: 6.2 UNITS/HOUR
INSULIN ADMINSTERED, INSADM: 6.6 UNITS/HOUR
INSULIN ADMINSTERED, INSADM: 7.1 UNITS/HOUR
INSULIN ORDER, INSORD: 1.9 UNITS/HOUR
INSULIN ORDER, INSORD: 16.5 UNITS/HOUR
INSULIN ORDER, INSORD: 19.8 UNITS/HOUR
INSULIN ORDER, INSORD: 2.5 UNITS/HOUR
INSULIN ORDER, INSORD: 2.8 UNITS/HOUR
INSULIN ORDER, INSORD: 2.9 UNITS/HOUR
INSULIN ORDER, INSORD: 2.9 UNITS/HOUR
INSULIN ORDER, INSORD: 22.2 UNITS/HOUR
INSULIN ORDER, INSORD: 3 UNITS/HOUR
INSULIN ORDER, INSORD: 3.5 UNITS/HOUR
INSULIN ORDER, INSORD: 3.6 UNITS/HOUR
INSULIN ORDER, INSORD: 4.2 UNITS/HOUR
INSULIN ORDER, INSORD: 6.1 UNITS/HOUR
INSULIN ORDER, INSORD: 6.2 UNITS/HOUR
INSULIN ORDER, INSORD: 6.6 UNITS/HOUR
INSULIN ORDER, INSORD: 7.1 UNITS/HOUR
LDLC SERPL CALC-MCNC: 68 MG/DL (ref 0–100)
LIPASE SERPL-CCNC: 258 U/L (ref 73–393)
LIPID PROFILE,FLP: NORMAL
LOW TARGET, LOT: 150 MG/DL
LYMPHOCYTES # BLD AUTO: 11 % (ref 12–49)
LYMPHOCYTES # BLD: 1.5 K/UL (ref 0.8–3.5)
MAGNESIUM SERPL-MCNC: 1.9 MG/DL (ref 1.6–2.4)
MAGNESIUM SERPL-MCNC: 2.1 MG/DL (ref 1.6–2.4)
MCH RBC QN AUTO: 30.7 PG (ref 26–34)
MCHC RBC AUTO-ENTMCNC: 36.2 G/DL (ref 30–36.5)
MCV RBC AUTO: 84.8 FL (ref 80–99)
MINUTES UNTIL NEXT BG, NBG: 60 MIN
MONOCYTES # BLD: 0.7 K/UL (ref 0–1)
MONOCYTES NFR BLD AUTO: 5 % (ref 5–13)
MULTIPLIER, MUL: 0.03
MULTIPLIER, MUL: 0.04
MULTIPLIER, MUL: 0.06
MULTIPLIER, MUL: 0.07
MULTIPLIER, MUL: 0.09
MULTIPLIER, MUL: 0.11
NEUTS SEG # BLD: 12.2 K/UL (ref 1.8–8)
NEUTS SEG NFR BLD AUTO: 84 % (ref 32–75)
ORDER INITIALS, ORDINIT: NORMAL
PHOSPHATE SERPL-MCNC: 0.6 MG/DL (ref 2.6–4.7)
PHOSPHATE SERPL-MCNC: 2 MG/DL (ref 2.6–4.7)
PLATELET # BLD AUTO: 143 K/UL (ref 150–400)
POTASSIUM SERPL-SCNC: 3.4 MMOL/L (ref 3.5–5.1)
POTASSIUM SERPL-SCNC: 3.5 MMOL/L (ref 3.5–5.1)
PROT SERPL-MCNC: 5.2 G/DL (ref 6.4–8.2)
RBC # BLD AUTO: 4.07 M/UL (ref 4.1–5.7)
SERVICE CMNT-IMP: ABNORMAL
SODIUM SERPL-SCNC: 146 MMOL/L (ref 136–145)
SODIUM SERPL-SCNC: 148 MMOL/L (ref 136–145)
TRIGL SERPL-MCNC: 90 MG/DL (ref ?–150)
VLDLC SERPL CALC-MCNC: 18 MG/DL
WBC # BLD AUTO: 14.5 K/UL (ref 4.1–11.1)

## 2017-08-01 PROCEDURE — 65660000000 HC RM CCU STEPDOWN

## 2017-08-01 PROCEDURE — 36415 COLL VENOUS BLD VENIPUNCTURE: CPT | Performed by: FAMILY MEDICINE

## 2017-08-01 PROCEDURE — 84100 ASSAY OF PHOSPHORUS: CPT | Performed by: FAMILY MEDICINE

## 2017-08-01 PROCEDURE — 74011000258 HC RX REV CODE- 258: Performed by: EMERGENCY MEDICINE

## 2017-08-01 PROCEDURE — 80061 LIPID PANEL: CPT | Performed by: FAMILY MEDICINE

## 2017-08-01 PROCEDURE — 85025 COMPLETE CBC W/AUTO DIFF WBC: CPT | Performed by: FAMILY MEDICINE

## 2017-08-01 PROCEDURE — 74011250637 HC RX REV CODE- 250/637: Performed by: HOSPITALIST

## 2017-08-01 PROCEDURE — 74011250637 HC RX REV CODE- 250/637: Performed by: FAMILY MEDICINE

## 2017-08-01 PROCEDURE — 74011636637 HC RX REV CODE- 636/637: Performed by: EMERGENCY MEDICINE

## 2017-08-01 PROCEDURE — 74011636637 HC RX REV CODE- 636/637: Performed by: INTERNAL MEDICINE

## 2017-08-01 PROCEDURE — 74011250637 HC RX REV CODE- 250/637: Performed by: INTERNAL MEDICINE

## 2017-08-01 PROCEDURE — 80053 COMPREHEN METABOLIC PANEL: CPT | Performed by: FAMILY MEDICINE

## 2017-08-01 PROCEDURE — 86341 ISLET CELL ANTIBODY: CPT | Performed by: INTERNAL MEDICINE

## 2017-08-01 PROCEDURE — 83690 ASSAY OF LIPASE: CPT | Performed by: FAMILY MEDICINE

## 2017-08-01 PROCEDURE — 82962 GLUCOSE BLOOD TEST: CPT

## 2017-08-01 PROCEDURE — 86337 INSULIN ANTIBODIES: CPT | Performed by: INTERNAL MEDICINE

## 2017-08-01 PROCEDURE — 74011000258 HC RX REV CODE- 258: Performed by: FAMILY MEDICINE

## 2017-08-01 PROCEDURE — 74011250636 HC RX REV CODE- 250/636: Performed by: INTERNAL MEDICINE

## 2017-08-01 PROCEDURE — 83735 ASSAY OF MAGNESIUM: CPT | Performed by: FAMILY MEDICINE

## 2017-08-01 RX ORDER — IBUPROFEN 600 MG/1
600 TABLET ORAL
Status: DISCONTINUED | OUTPATIENT
Start: 2017-08-01 | End: 2017-08-03 | Stop reason: HOSPADM

## 2017-08-01 RX ORDER — DEXTROSE MONOHYDRATE AND SODIUM CHLORIDE 5; .9 G/100ML; G/100ML
50 INJECTION, SOLUTION INTRAVENOUS CONTINUOUS
Status: DISCONTINUED | OUTPATIENT
Start: 2017-08-01 | End: 2017-08-03 | Stop reason: HOSPADM

## 2017-08-01 RX ADMIN — DEXTROSE MONOHYDRATE AND SODIUM CHLORIDE 250 ML/HR: 5; .9 INJECTION, SOLUTION INTRAVENOUS at 03:22

## 2017-08-01 RX ADMIN — DEXTROSE MONOHYDRATE AND SODIUM CHLORIDE 100 ML/HR: 5; .9 INJECTION, SOLUTION INTRAVENOUS at 11:36

## 2017-08-01 RX ADMIN — IBUPROFEN 600 MG: 600 TABLET, FILM COATED ORAL at 13:51

## 2017-08-01 RX ADMIN — SODIUM CHLORIDE 4.2 UNITS/HR: 900 INJECTION, SOLUTION INTRAVENOUS at 10:06

## 2017-08-01 RX ADMIN — POTASSIUM PHOSPHATE, MONOBASIC 500 MG: 500 TABLET, SOLUBLE ORAL at 01:46

## 2017-08-01 RX ADMIN — ENOXAPARIN SODIUM 40 MG: 40 INJECTION SUBCUTANEOUS at 15:42

## 2017-08-01 RX ADMIN — ONDANSETRON 4 MG: 2 INJECTION INTRAMUSCULAR; INTRAVENOUS at 01:46

## 2017-08-01 RX ADMIN — INSULIN LISPRO 34 UNITS: 100 INJECTION, SUSPENSION SUBCUTANEOUS at 16:59

## 2017-08-01 RX ADMIN — DEXTROSE MONOHYDRATE AND SODIUM CHLORIDE 250 ML/HR: 5; .9 INJECTION, SOLUTION INTRAVENOUS at 07:33

## 2017-08-01 RX ADMIN — ACETAMINOPHEN 650 MG: 325 TABLET ORAL at 06:40

## 2017-08-01 RX ADMIN — IBUPROFEN 600 MG: 600 TABLET, FILM COATED ORAL at 22:35

## 2017-08-01 NOTE — PROGRESS NOTES
General Daily Progress Note    Admit Date: 7/31/2017  Hospital day 2    Subjective:     Patient has fatigue ,thirst..   Medication side effects: none    Current Facility-Administered Medications   Medication Dose Route Frequency    dextrose 5% and 0.9% NaCl infusion  250 mL/hr IntraVENous CONTINUOUS    sodium chloride (NS) flush 5-10 mL  5-10 mL IntraVENous PRN    sodium chloride (NS) flush 5-10 mL  5-10 mL IntraVENous PRN    insulin regular (NOVOLIN R, HUMULIN R) 100 Units in 0.9% sodium chloride 100 mL infusion  0-50 Units/hr IntraVENous TITRATE    glucose chewable tablet 16 g  4 Tab Oral PRN    dextrose (D50W) injection syrg 12.5-25 g  12.5-25 g IntraVENous PRN    glucagon (GLUCAGEN) injection 1 mg  1 mg IntraMUSCular PRN    acetaminophen (TYLENOL) tablet 650 mg  650 mg Oral Q4H PRN    ondansetron (ZOFRAN) injection 4 mg  4 mg IntraVENous Q4H PRN    enoxaparin (LOVENOX) injection 40 mg  40 mg SubCUTAneous Q24H        Review of Systems  Constitutional: positive for fatigue and malaise  Respiratory: negative  Cardiovascular: negative  Gastrointestinal: negative    Objective:     Patient Vitals for the past 8 hrs:   BP Temp Pulse Resp SpO2   08/01/17 0300 138/66 98.2 °F (36.8 °C) (!) 113 18 95 %   08/01/17 0000 130/62 98.3 °F (36.8 °C) (!) 116 18 99 %     07/31 1901 - 08/01 0700  In: -   Out: 1200 [Urine:1200]  07/30 0701 - 07/31 1900  In: 1552.7 [I.V.:1552.7]  Out: 1625 [Urine:1625]    Physical Exam:   Visit Vitals    /66    Pulse (!) 113    Temp 98.2 °F (36.8 °C)    Resp 18    Wt 242 lb 8.1 oz (110 kg)    SpO2 95%    BMI 33.82 kg/m2     General appearance: alert, fatigued, cooperative, no distress, appears stated age  Lungs: clear to auscultation bilaterally  Heart: regular rate and rhythm  Abdomen: soft, non-tender.  Bowel sounds normal. No masses,  no organomegaly  Pulses: 2+ and symmetric  Neurologic: Grossly normal      ECG: sinus tachycardia     Data Review   Recent Results (from the past 24 hour(s))   EKG, 12 LEAD, INITIAL    Collection Time: 07/31/17 10:49 AM   Result Value Ref Range    Ventricular Rate 124 BPM    Atrial Rate 124 BPM    P-R Interval 154 ms    QRS Duration 100 ms    Q-T Interval 330 ms    QTC Calculation (Bezet) 474 ms    Calculated P Axis 59 degrees    Calculated R Axis 13 degrees    Calculated T Axis 48 degrees    Diagnosis       Sinus tachycardia  When compared with ECG of 16-JUN-2012 23:03,  premature ventricular complexes are no longer present  T wave amplitude has increased in Anterolateral leads  Confirmed by Chris Richard ([id number]) on 7/31/2017 11:30:56 AM     GLUCOSE, POC    Collection Time: 07/31/17 10:58 AM   Result Value Ref Range    Glucose (POC) >600 (HH) 65 - 100 mg/dL    Performed by [address] [place] [address] [place], POC    Collection Time: 07/31/17 10:59 AM   Result Value Ref Range    Glucose (POC) >600 (HH) 65 - 100 mg/dL    Performed by [facility]    POC CHEM8    Collection Time: 07/31/17 11:03 AM   Result Value Ref Range    Calcium, ionized (POC) 1.12 1.12 - 1.32 MMOL/L    Sodium (POC) 126 (L) 136 - 145 MMOL/L    Potassium (POC) 6.1 (H) 3.5 - 5.1 MMOL/L    Chloride (POC) 92 (L) 98 - 107 MMOL/L    CO2 (POC) 8 (LL) 21 - 32 MMOL/L    Anion gap (POC) 33 (H) 5 - 15 mmol/L    Glucose (POC) >700 (HH) 65 - 100 MG/DL    BUN (POC) 53 (H) 9 - 20 MG/DL    Creatinine (POC) 2.3 (H) 0.6 - 1.3 MG/DL    GFRAA, POC 37 (L) >60 ml/min/1.73m2    GFRNA, POC 31 (L) >60 ml/min/1.73m2    Hemoglobin (POC) 16.7 12.1 - 17.0 GM/DL    Hematocrit (POC) 49 36.6 - 50.3 %    Comment Comment Not Indicated.      VENOUS BLOOD GAS    Collection Time: 07/31/17 11:05 AM   Result Value Ref Range    VENOUS PH 6.87 (LL) 7.32 - 7.42      VENOUS PCO2 20 (L) 41 - 51 mmHg    VENOUS PO2 59 (H) 25 - 40 mmHg    VENOUS O2 SATURATION 68 65 - 88 %    VENOUS BICARBONATE 4 (L) 23 - 28 mmol/L    VENOUS BASE DEFICIT 28.9 mmol/L    O2 METHOD ROOM AIR      Sample source VENOUS      SITE OTHER      Critical value read back Elizabeth Baumgarten MD    CBC WITH AUTOMATED DIFF    Collection Time: 07/31/17 11:08 AM   Result Value Ref Range    WBC 31.4 (H) 4.1 - 11.1 K/uL    RBC 4.92 4.10 - 5.70 M/uL    HGB 15.4 12.1 - 17.0 g/dL    HCT 46.7 36.6 - 50.3 %    MCV 94.9 80.0 - 99.0 FL    MCH 31.3 26.0 - 34.0 PG    MCHC 33.0 30.0 - 36.5 g/dL    RDW 14.7 (H) 11.5 - 14.5 %    PLATELET 815 841 - 570 K/uL    NEUTROPHILS 73 %    BAND NEUTROPHILS 18 %    LYMPHOCYTES 8 %    MONOCYTES 0 %    EOSINOPHILS 1 %    BASOPHILS 0 %    ABS. NEUTROPHILS 28.6 K/UL    ABS. LYMPHOCYTES 2.5 K/UL    ABS. MONOCYTES 0.0 K/UL    ABS. EOSINOPHILS 0.3 K/UL    ABS.  BASOPHILS 0.0 K/UL    RBC COMMENTS NORMOCYTIC, NORMOCHROMIC      DF MANUAL     LACTIC ACID    Collection Time: 07/31/17 11:08 AM   Result Value Ref Range    Lactic acid 9.8 (HH) 0.4 - 2.0 MMOL/L   HEMOGLOBIN A1C WITH EAG    Collection Time: 07/31/17 11:08 AM   Result Value Ref Range    Hemoglobin A1c 9.5 (H) 4.2 - 6.3 %    Est. average glucose 226 mg/dL   GLUCOSTABILIZER    Collection Time: 07/31/17 12:25 PM   Result Value Ref Range    Glucose 601 mg/dL    Insulin order 10.8 units/hour    Insulin adminstered 10.8 units/hour    Multiplier 0.020     Low target 150 mg/dL    High target 250 mg/dL    D50 order 0.0 ml    D50 administered 0.00 ml    Minutes until next BG 60 min    Order initials MS     Administered initials MS     GLSCOM Comments     MAGNESIUM    Collection Time: 07/31/17 12:26 PM   Result Value Ref Range    Magnesium 1.5 (L) 1.6 - 2.4 mg/dL   METABOLIC PANEL, BASIC    Collection Time: 07/31/17 12:26 PM   Result Value Ref Range    Sodium 139 136 - 145 mmol/L    Potassium 3.7 3.5 - 5.1 mmol/L    Chloride 108 97 - 108 mmol/L    CO2 <5 (LL) 21 - 32 mmol/L    Anion gap Cannot be calulated 5 - 15 mmol/L    Glucose 765 (HH) 65 - 100 mg/dL    BUN 31 (H) 6 - 20 MG/DL    Creatinine 1.80 (H) 0.70 - 1.30 MG/DL    BUN/Creatinine ratio 17 12 - 20      GFR est AA 49 (L) >60 ml/min/1.73m2    GFR est non-AA 41 (L) >60 ml/min/1.73m2    Calcium 12.9 (H) 8.5 - 10.1 MG/DL   PHOSPHORUS    Collection Time: 07/31/17 12:26 PM   Result Value Ref Range    Phosphorus 6.1 (H) 2.6 - 4.7 MG/DL   GLUCOSE, POC    Collection Time: 07/31/17  1:37 PM   Result Value Ref Range    Glucose (POC) >600 (HH) 65 - 100 mg/dL    Performed by [employer]    GLUCOSE, POC    Collection Time: 07/31/17  1:41 PM   Result Value Ref Range    Glucose (POC) >600 (HH) 65 - 100 mg/dL    Performed by Nolen Sites    Collection Time: 07/31/17  1:41 PM   Result Value Ref Range    Glucose 601 mg/dL    Insulin order 16.2 units/hour    Insulin adminstered 16.2 units/hour    Multiplier 0.030     Low target 150 mg/dL    High target 250 mg/dL    D50 order 0.0 ml    D50 administered 0.00 ml    Minutes until next BG 60 min    Order initials MS     Administered initials MS     GLSCOM Comments     URINALYSIS W/MICROSCOPIC    Collection Time: 07/31/17  1:56 PM   Result Value Ref Range    Color YELLOW/STRAW      Appearance CLOUDY (A) CLEAR      Specific gravity 1.025 1.003 - 1.030      pH (UA) 5.0 5.0 - 8.0      Protein 30 (A) NEG mg/dL    Glucose >1000 (A) NEG mg/dL    Ketone 40 (A) NEG mg/dL    Bilirubin NEGATIVE  NEG      Blood SMALL (A) NEG      Urobilinogen 0.2 0.2 - 1.0 EU/dL    Nitrites NEGATIVE  NEG      Leukocyte Esterase NEGATIVE  NEG      WBC 0-4 0 - 4 /hpf    RBC 5-10 0 - 5 /hpf    Epithelial cells FEW FEW /lpf    Bacteria NEGATIVE  NEG /hpf    Hyaline cast 2-5 0 - 5 /lpf   GLUCOSE, POC    Collection Time: 07/31/17  3:10 PM   Result Value Ref Range    Glucose (POC) >600 (HH) 65 - 100 mg/dL    Performed by [employer]    GLUCOSE, POC    Collection Time: 07/31/17  3:13 PM   Result Value Ref Range    Glucose (POC) >600 (HH) 65 - 100 mg/dL    Performed by Nolen Sites    Collection Time: 07/31/17  3:25 PM   Result Value Ref Range    Glucose 601 mg/dL    Insulin order 21.6 units/hour    Insulin adminstered 21.6 units/hour Multiplier 0.040     Low target 150 mg/dL    High target 250 mg/dL    D50 order 0.0 ml    D50 administered 0.00 ml    Minutes until next BG 60 min    Order initials MS     Administered initials MS     GLSCOM Comments     LACTIC ACID    Collection Time: 07/31/17  4:09 PM   Result Value Ref Range    Lactic acid 3.0 (HH) 0.4 - 2.0 MMOL/L   MAGNESIUM    Collection Time: 07/31/17  4:09 PM   Result Value Ref Range    Magnesium 2.5 (H) 1.6 - 2.4 mg/dL   PHOSPHORUS    Collection Time: 07/31/17  4:09 PM   Result Value Ref Range    Phosphorus 4.8 (H) 2.6 - 4.7 MG/DL   METABOLIC PANEL, BASIC    Collection Time: 07/31/17  4:09 PM   Result Value Ref Range    Sodium 135 (L) 136 - 145 mmol/L    Potassium 4.5 3.5 - 5.1 mmol/L    Chloride 100 97 - 108 mmol/L    CO2 7 (LL) 21 - 32 mmol/L    Anion gap 28 (H) 5 - 15 mmol/L    Glucose 851 (HH) 65 - 100 mg/dL    BUN 48 (H) 6 - 20 MG/DL    Creatinine 2.89 (H) 0.70 - 1.30 MG/DL    BUN/Creatinine ratio 17 12 - 20      GFR est AA 29 (L) >60 ml/min/1.73m2    GFR est non-AA 24 (L) >60 ml/min/1.73m2    Calcium 7.2 (L) 8.5 - 10.1 MG/DL   MAGNESIUM    Collection Time: 07/31/17  4:09 PM   Result Value Ref Range    Magnesium 2.4 1.6 - 2.4 mg/dL   GLUCOSE, POC    Collection Time: 07/31/17  4:32 PM   Result Value Ref Range    Glucose (POC) >600 (HH) 65 - 100 mg/dL    Performed by Francisca Carver    GLUCOSE, POC    Collection Time: 07/31/17  4:33 PM   Result Value Ref Range    Glucose (POC) >600 (HH) 65 - 100 mg/dL    Performed by Pilar Hawthorne    Collection Time: 07/31/17  4:34 PM   Result Value Ref Range    Glucose 601 mg/dL    Insulin order 27.1 units/hour    Insulin adminstered 27.1 units/hour    Multiplier 0.050     Low target 150 mg/dL    High target 250 mg/dL    D50 order 0.0 ml    D50 administered 0.00 ml    Minutes until next BG 60 min    Order initials MS     Administered initials MS     GLSCOM Comments     GLUCOSTABILIZER    Collection Time: 07/31/17  5:57 PM   Result Value Ref Range    Glucose 601 mg/dL    Insulin order 32.5 units/hour    Insulin adminstered 32.5 units/hour    Multiplier 0.060     Low target 150 mg/dL    High target 250 mg/dL    D50 order 0.0 ml    D50 administered 0.00 ml    Minutes until next BG 60 min    Order initials [facility]     Administered initials [facility]     GLSCOM Comments     GLUCOSE, POC    Collection Time: 07/31/17  7:20 PM   Result Value Ref Range    Glucose (POC) 569 (H) 65 - 100 mg/dL    Performed by Ivelisse Sharpe    Collection Time: 07/31/17  7:26 PM   Result Value Ref Range    Glucose 569 mg/dL    Insulin order 35.6 units/hour    Insulin adminstered 35.6 units/hour    Multiplier 0.070     Low target 150 mg/dL    High target 250 mg/dL    D50 order 0.0 ml    D50 administered 0.00 ml    Minutes until next BG 60 min    Order initials [facility]     Administered initials [facility]     [facility]OM Comments     GLUCOSE, POC    Collection Time: 07/31/17  8:17 PM   Result Value Ref Range    Glucose (POC) 513 (H) 65 - 100 mg/dL    Performed by Steve Chavez    Collection Time: 07/31/17  8:20 PM   Result Value Ref Range    Glucose 513 mg/dL    Insulin order 36.2 units/hour    Insulin adminstered 36.2 units/hour    Multiplier 0.080     Low target 150 mg/dL    High target 250 mg/dL    D50 order 0.0 ml    D50 administered 0.00 ml    Minutes until next BG 60 min    Order initials [address]     Administered initials [facility] Comments     GLUCOSE, POC    Collection Time: 07/31/17  9:14 PM   Result Value Ref Range    Glucose (POC) 415 (H) 65 - 100 mg/dL    Performed by Natasha Perez*    GLUCOSTABILIZER    Collection Time: 07/31/17  9:15 PM   Result Value Ref Range    Glucose 415 mg/dL    Insulin order 28.4 units/hour    Insulin adminstered 28.4 units/hour    Multiplier 0.080     Low target 150 mg/dL    High target 250 mg/dL    D50 order 0.0 ml    D50 administered 0.00 ml    Minutes until next BG 60 min    Order initials [address]     Administered initials      GLSCOM Comments     METABOLIC PANEL, BASIC    Collection Time: 07/31/17  9:23 PM   Result Value Ref Range    Sodium 144 136 - 145 mmol/L    Potassium 3.4 (L) 3.5 - 5.1 mmol/L    Chloride 110 (H) 97 - 108 mmol/L    CO2 20 (L) 21 - 32 mmol/L    Anion gap 14 5 - 15 mmol/L    Glucose 401 (H) 65 - 100 mg/dL    BUN 40 (H) 6 - 20 MG/DL    Creatinine 2.63 (H) 0.70 - 1.30 MG/DL    BUN/Creatinine ratio 15 12 - 20      GFR est AA 32 (L) >60 ml/min/1.73m2    GFR est non-AA 26 (L) >60 ml/min/1.73m2    Calcium 7.8 (L) 8.5 - 10.1 MG/DL   MAGNESIUM    Collection Time: 07/31/17  9:23 PM   Result Value Ref Range    Magnesium 2.2 1.6 - 2.4 mg/dL   PHOSPHORUS    Collection Time: 07/31/17  9:23 PM   Result Value Ref Range    Phosphorus <0.5 (LL) 2.6 - 4.7 MG/DL   GLUCOSE, POC    Collection Time: 07/31/17 10:17 PM   Result Value Ref Range    Glucose (POC) 346 (H) 65 - 100 mg/dL    Performed by Alessandra Chandra    Collection Time: 07/31/17 10:18 PM   Result Value Ref Range    Glucose 346 mg/dL    Insulin order 25.7 units/hour    Insulin adminstered 25.7 units/hour    Multiplier 0.090     Low target 150 mg/dL    High target 250 mg/dL    D50 order 0.0 ml    D50 administered 0.00 ml    Minutes until next BG 60 min    Order initials [address] [place] [address]     Administered initials [address] [place] [address]     GLSCOM Comments     GLUCOSE, POC    Collection Time: 07/31/17 11:18 PM   Result Value Ref Range    Glucose (POC) 304 (H) 65 - 100 mg/dL    Performed by Alessandra Chandra    Collection Time: 07/31/17 11:23 PM   Result Value Ref Range    Glucose 304 mg/dL    Insulin order 24.4 units/hour    Insulin adminstered 24.4 units/hour    Multiplier 0.100     Low target 150 mg/dL    High target 250 mg/dL    D50 order 0.0 ml    D50 administered 0.00 ml    Minutes until next BG 60 min    Order initials [address] [place] [address]     Administered initials [address]     [facility]OM Comments     METABOLIC PANEL, BASIC    Collection Time: 07/31/17 11:35 PM   Result Value Ref Range    Sodium 146 (H) 136 - 145 mmol/L    Potassium 3.4 (L) 3.5 - 5.1 mmol/L    Chloride 113 (H) 97 - 108 mmol/L    CO2 21 21 - 32 mmol/L    Anion gap 12 5 - 15 mmol/L    Glucose 293 (H) 65 - 100 mg/dL    BUN 39 (H) 6 - 20 MG/DL    Creatinine 2.48 (H) 0.70 - 1.30 MG/DL    BUN/Creatinine ratio 16 12 - 20      GFR est AA 34 (L) >60 ml/min/1.73m2    GFR est non-AA 28 (L) >60 ml/min/1.73m2    Calcium 7.9 (L) 8.5 - 10.1 MG/DL   MAGNESIUM    Collection Time: 07/31/17 11:35 PM   Result Value Ref Range    Magnesium 2.1 1.6 - 2.4 mg/dL   PHOSPHORUS    Collection Time: 07/31/17 11:35 PM   Result Value Ref Range    Phosphorus 0.6 (LL) 2.6 - 4.7 MG/DL   GLUCOSE, POC    Collection Time: 08/01/17 12:20 AM   Result Value Ref Range    Glucose (POC) 262 (H) 65 - 100 mg/dL    Performed by Claude [place]    Collection Time: 08/01/17 12:22 AM   Result Value Ref Range    Glucose 262 mg/dL    Insulin order 22.2 units/hour    Insulin adminstered 22.2 units/hour    Multiplier 0.110     Low target 150 mg/dL    High target 250 mg/dL    D50 order 0.0 ml    D50 administered 0.00 ml    Minutes until next BG 60 min    Order initials [address]     Administered initials [address]     [facility] Comments     GLUCOSE, POC    Collection Time: 08/01/17  1:17 AM   Result Value Ref Range    Glucose (POC) 240 (H) 65 - 100 mg/dL    Performed by Claude [place]    Collection Time: 08/01/17  1:17 AM   Result Value Ref Range    Glucose 240 mg/dL    Insulin order 19.8 units/hour    Insulin adminstered 19.8 units/hour    Multiplier 0.110     Low target 150 mg/dL    High target 250 mg/dL    D50 order 0.0 ml    D50 administered 0.00 ml    Minutes until next BG 60 min    Order initials [address]     Administered initials [address] [place] [address] Comments     GLUCOSE, POC    Collection Time: 08/01/17  2:10 AM   Result Value Ref Range    Glucose (POC) 210 (H) 65 - 100 mg/dL    Performed by Claude Hua    Collection Time: 08/01/17  2:11 AM   Result Value Ref Range    Glucose 210 mg/dL    Insulin order 16.5 units/hour    Insulin adminstered 16.5 units/hour    Multiplier 0.110     Low target 150 mg/dL    High target 250 mg/dL    D50 order 0.0 ml    D50 administered 0.00 ml    Minutes until next BG 60 min    Order initials ch     Administered initials      GLSCOM Comments     GLUCOSE, POC    Collection Time: 08/01/17  3:18 AM   Result Value Ref Range    Glucose (POC) 131 (H) 65 - 100 mg/dL    Performed by Nery Monahan    Collection Time: 08/01/17  3:19 AM   Result Value Ref Range    Glucose 131 mg/dL    Insulin order 6.2 units/hour    Insulin adminstered 6.2 units/hour    Multiplier 0.088     Low target 150 mg/dL    High target 250 mg/dL    D50 order 0.0 ml    D50 administered 0.00 ml    Minutes until next BG 60 min    Order initials      Administered initials      GL[facility]OM Comments     GLUCOSE, POC    Collection Time: 08/01/17  4:39 AM   Result Value Ref Range    Glucose (POC) 147 (H) 65 - 100 mg/dL    Performed by Dannie Do    Collection Time: 08/01/17  4:39 AM   Result Value Ref Range    Glucose 147 mg/dL    Insulin order 6.1 units/hour    Insulin adminstered 6.1 units/hour    Multiplier 0.070     Low target 150 mg/dL    High target 250 mg/dL    D50 order 0.0 ml    D50 administered 0.00 ml    Minutes until next BG 60 min    Order initials [address]     Administered initials [address]     [facility] Comments     GLUCOSE, POC    Collection Time: 08/01/17  5:41 AM   Result Value Ref Range    Glucose (POC) 161 (H) 65 - 100 mg/dL    Performed by Nery Monahan    Collection Time: 08/01/17  5:41 AM   Result Value Ref Range    Glucose 161 mg/dL    Insulin order 7.1 units/hour    Insulin adminstered 7.1 units/hour    Multiplier 0.070     Low target 150 mg/dL    High target 250 mg/dL    D50 order 0.0 ml    D50 administered 0.00 ml    Minutes until next BG 60 min    Order initials ch     Administered initials ch     GLSCOM Comments             Assessment:     Active Problems:    DKA (diabetic ketoacidoses) ([facility] [address] (7/31/2017)      Type 2 diabetes mellitus with ophthalmic complication, without long-term current use of insulin ([facility] [address] (8/1/2017)      Acute renal failure with tubular necrosis ([facility] [address] (8/1/2017)        Plan:     Feeling better,thirsty,fatigued. Labs improving. Continue current meds and treatments. Will consult Endo. Noncompliance an issue

## 2017-08-01 NOTE — DIABETES MGMT
[facility] Consult Note    Recommendations/ Comments:Transition off insulin gtt per Dr. Sharon Fairchild orders. Consult received for:       [x]             Insulin infusion     [x]             DKA/HHS    Chart reviewed and initial evaluation complete on Zelpha November. Patient is a 51 y.o. male with known DM on Novolin 70/30 14units ac b and 34units ac d at home. Failed trail of OHA's at dx at age 28. Pt was switched to OHA's approx 6-7 wks ago for a trial.  Has not been monitoring his BG. Denies missing medication doses. S/s of hyperglycemia present. His diet recall revels 6 small meals typically. Decent knowledge of meal planning/ food choices. Provided pt with new glucometer- declined to give a return demo. Discussed with pt basal/bolus insulin, DKA, need to return to insulin therapy. Pt to be seen by endo and would like to f/u with him post discharge. Assessed and instructed patient on the following:   ·  interpretation of lab results, complications of diabetes mellitus, hypoglycemia prevention and treatment, SMBG skills, nutrition and referred to Diabetes Educator    Provided patient with the following: [x]             Survival skills education materials               [x]             Insulin education materials               []             CHO counting education materials               [x]             Outpatient DTC contact number               [x]             Glucometer- accu-chek guide                 Discussed with patient and/or family need for follow up appointment for diabetes management after discharge.       A1c:   Lab Results   Component Value Date/Time    Hemoglobin A1c 9.5 07/31/2017 11:08 AM       Recent Glucose Results:   Lab Results   Component Value Date/Time     (H) 08/01/2017 06:36 AM     (H) 07/31/2017 11:35 PM     (H) 07/31/2017 09:23 PM    GLUCPOC 135 (H) 08/01/2017 12:54 PM    GLUCPOC 147 (H) 08/01/2017 11:31 AM    GLUCPOC 154 (H) 08/01/2017 10:03 AM Lab Results   Component Value Date/Time    Creatinine 1.93 08/01/2017 06:36 AM     CrCl cannot be calculated (Unknown ideal weight. ). Active Orders   Diet    DIET CLEAR LIQUID        PO intake: No data found. Current hospital DM medication: insulin gtt    Will continue to follow as needed. Thank you.   DAQUAN CarterN, RN, [phone]

## 2017-08-01 NOTE — TELEPHONE ENCOUNTER
Israel Jack, with DTC, called just to say that it would be great if Dr. Luis Hernandez could see this patient at [facility] for the hospital consult. MODESTA. ...

## 2017-08-01 NOTE — CONSULTS
Consult    Patient: Shine Cortez MRN: [id number]  SSN: [id number]    YOB: 1970  Age: 51 y.o. Sex: male      Subjective:      Shine Cortez is a 51 y.o. male who is being seen for diabetes. Pt was originally diagnosed at the at of 28 and he has been on insulin for about all that time. He notes that 7 weeks ago he asked his PCP if he could come off insulin and try an oral only regimen. He was taken off his Novolin 70/30 14 units with breakfast and 34 units with dinner and started on Metformin XR, Glimepride and Pioglitazone. Pt notes that since that time his BGs have been increasing steadily. He presented to the ED on 7/31/17 with N/V and abdominal pain. His  and his A1C had increased from 7.4% on 6/7/17 to 9.4% on 7/31/17. His Bicarb was <5 and he was in [place]IAN[place]. He was started on IVF and an IV insulin drip. Since Noon of 7/31/17 to MN he recived 334 units of insulin and from MN-Noon today he had received 102 units. His BGs finally broke around 3AM this morning and started to drop into the 130-160's range. His BGs have been hanging in that range on 3 units per hour since 8AM.  Pt reports that he does have hx of retinopathy but no hx of nephropathy or neuropathy. His wife was at the bedside and notes that he has not been checking his BGs, but has been adherent to his PO meds and \"has been eating pretty healthy\". Pt reports that overall he is feeling much better and today his AG was 7 with a bicarb of 25. He notes a sore throat, but denies N/V, abdominal pain, SOB, CP.      Past Medical History:   Diagnosis Date    Diabetes [facility])     ED (erectile dysfunction) 3/7/2012    Encounter for long-term (current) use of other medications 6/14/2012    Esophageal reflux 3/7/2012    IDDM (insulin dependent diabetes mellitus) ([facility] 2/13/2012    Retinopathy due to secondary diabetes ([facility] 6/15/2012    Type II or unspecified type diabetes mellitus with neurological manifestations, not stated as uncontrolled 2/13/2012     Past Surgical History:   Procedure Laterality Date    HX ORTHOPAEDIC  1988    Knee surgery.       Family History   Problem Relation Age of Onset    Diabetes Mother     Heart Disease Mother     Hypertension Mother     Heart Disease Father      Social History   Substance Use Topics    Smoking status: Former Smoker    Smokeless tobacco: Former User     Quit date: 5/13/1995    Alcohol use Yes      Comment: socially      Current Facility-Administered Medications   Medication Dose Route Frequency Provider Last Rate Last Dose    dextrose 5% and 0.9% NaCl infusion  100 mL/hr IntraVENous CONTINUOUS Luz Diaz  mL/hr at 08/01/17 1136 100 mL/hr at 08/01/17 1136    insulin lispro protamine/insulin lispro (HUMALOG MIX 75/25) injection 34 Units  34 Units SubCUTAneous ACD Doyle Segura MD        [START ON 8/2/2017] insulin lispro protamine/insulin lispro (HUMALOG MIX 75/25) injection 14 Units  14 Units SubCUTAneous ACB Doyle Segura MD        sodium chloride (NS) flush 5-10 mL  5-10 mL IntraVENous PRN Donivan Staci, DO   10 mL at 07/31/17 1800    sodium chloride (NS) flush 5-10 mL  5-10 mL IntraVENous PRN Donivan Staci, DO        insulin regular (NOVOLIN R, HUMULIN R) 100 Units in 0.9% sodium chloride 100 mL infusion  0-50 Units/hr IntraVENous TITRATE Donivan Staci, DO 1.9 mL/hr at 08/01/17 1308 1.9 Units/hr at 08/01/17 1308    glucose chewable tablet 16 g  4 Tab Oral PRN Donivan Staci, DO        dextrose (D50W) injection syrg 12.5-25 g  12.5-25 g IntraVENous PRN Donivan Staci, DO        glucagon (GLUCAGEN) injection 1 mg  1 mg IntraMUSCular PRN Donivan Staci, DO        acetaminophen (TYLENOL) tablet 650 mg  650 mg Oral Q4H PRN Taras Paredes MD   650 mg at 08/01/17 0640    ondansetron (ZOFRAN) injection 4 mg  4 mg IntraVENous Q4H PRN Taras Paredes MD   4 mg at 08/01/17 0146    enoxaparin (LOVENOX) injection 40 mg  40 mg SubCUTAneous Q24H Taras Paredes MD   40 mg at 07/31/17 1735        No Known Allergies    Review of Systems:  A comprehensive review of systems was negative except for that written in the History of Present Illness. Objective:     Vitals:    08/01/17 0000 08/01/17 0300 08/01/17 0734 08/01/17 1134   BP: 130/62 138/66 123/75 133/75   Pulse: (!) 116 (!) 113 (!) 105 (!) 103   Resp: 18 18 18 18   Temp: 98.3 °F (36.8 °C) 98.2 °F (36.8 °C) 98.6 °F (37 °C) 97.5 °F (36.4 °C)   SpO2: 99% 95% 97% 96%   Weight:            Physical Exam:  GENERAL: alert, cooperative, no distress, appears stated age  EYE: negative  LYMPHATIC: Cervical, supraclavicular, and axillary nodes normal.   THROAT & NECK: normal and no erythema or exudates noted. LUNG: clear to auscultation bilaterally  HEART: regular rate and rhythm, S1, S2 normal, no murmur, click, rub or gallop  ABDOMEN: soft, non-tender. Bowel sounds normal. No masses,  no organomegaly  EXTREMITIES:  extremities normal, atraumatic, no cyanosis or edema  SKIN: Normal.  NEUROLOGIC: AOx3. Reflexes and motor strength normal and symmetric. Component      Latest Ref Rng & Units 8/1/2017 8/1/2017 8/1/2017 8/1/2017           6:36 AM  6:36 AM  6:36 AM  6:36 AM   Sodium      136 - 145 mmol/L       Potassium      3.5 - 5.1 mmol/L       Chloride      97 - 108 mmol/L       CO2      21 - 32 mmol/L       Anion gap      5 - 15 mmol/L       Glucose      65 - 100 mg/dL       BUN      6 - 20 MG/DL       Creatinine      0.70 - 1.30 MG/DL       BUN/Creatinine ratio      12 - 20         GFR est AA      >60 ml/min/1.73m2       GFR est non-AA      >60 ml/min/1.73m2       Calcium      8.5 - 10.1 MG/DL       Bilirubin, total      0.2 - 1.0 MG/DL       ALT (SGPT)      12 - 78 U/L       AST      15 - 37 U/L       Alk.  phosphatase      45 - 117 U/L       Protein, total      6.4 - 8.2 g/dL       Albumin      3.5 - 5.0 g/dL       Globulin      2.0 - 4.0 g/dL       A-G Ratio      1.1 - 2.2         WBC      4.1 - 11.1 K/uL  14.5 (H)     RBC      4.10 - 5.70 M/uL  4.07 (L)     HGB      12.1 - 17.0 g/dL  12.5     HCT      36.6 - 50.3 %  34.5 (L)     MCV      80.0 - 99.0 FL  84.8     MCH      26.0 - 34.0 PG  30.7     MCHC      30.0 - 36.5 g/dL  36.2     RDW      11.5 - 14.5 %  14.4     PLATELET      414 - 668 K/uL  143 (L)     NEUTROPHILS      32 - 75 %  84 (H)     LYMPHOCYTES      12 - 49 %  11 (L)     MONOCYTES      5 - 13 %  5     EOSINOPHILS      0 - 7 %  0     BASOPHILS      0 - 1 %  0     ABS. NEUTROPHILS      1.8 - 8.0 K/UL  12.2 (H)     ABS. LYMPHOCYTES      0.8 - 3.5 K/UL  1.5     ABS. MONOCYTES      0.0 - 1.0 K/UL  0.7     ABS. EOSINOPHILS      0.0 - 0.4 K/UL  0.0     ABS. BASOPHILS      0.0 - 0.1 K/UL  0.0     Cholesterol, total      <200 MG/      Triglyceride      <150 MG/DL 90      HDL Cholesterol      MG/DL 45      LDL, calculated      0 - 100 MG/DL 68      VLDL, calculated      MG/DL 18      CHOL/HDL Ratio      0 - 5.0   2.9      Magnesium      1.6 - 2.4 mg/dL    1.9   Phosphorus      2.6 - 4.7 MG/DL   2.0 (L)      Component      Latest Ref Rng & Units 8/1/2017           6:36 AM   Sodium      136 - 145 mmol/L 148 (H)   Potassium      3.5 - 5.1 mmol/L 3.5   Chloride      97 - 108 mmol/L 116 (H)   CO2      21 - 32 mmol/L 25   Anion gap      5 - 15 mmol/L 7   Glucose      65 - 100 mg/dL 353 (H)   BUN      6 - 20 MG/DL 27 (H)   Creatinine      0.70 - 1.30 MG/DL 1.93 (H)   BUN/Creatinine ratio      12 - 20   14   GFR est AA      >60 ml/min/1.73m2 45 (L)   GFR est non-AA      >60 ml/min/1.73m2 38 (L)   Calcium      8.5 - 10.1 MG/DL 7.2 (L)   Bilirubin, total      0.2 - 1.0 MG/DL 0.4   ALT (SGPT)      12 - 78 U/L 49   AST      15 - 37 U/L 27   Alk.  phosphatase      45 - 117 U/L 85   Protein, total      6.4 - 8.2 g/dL 5.2 (L)   Albumin      3.5 - 5.0 g/dL 2.7 (L)   Globulin      2.0 - 4.0 g/dL 2.5   A-G Ratio      1.1 - 2.2   1.1   WBC      4.1 - 11.1 K/uL    RBC      4.10 - 5.70 M/uL    HGB      12.1 - 17.0 g/dL    HCT      36.6 - 50.3 %    MCV 80.0 - 99.0 FL    MCH      26.0 - 34.0 PG    MCHC      30.0 - 36.5 g/dL    RDW      11.5 - 14.5 %    PLATELET      511 - 695 K/uL    NEUTROPHILS      32 - 75 %    LYMPHOCYTES      12 - 49 %    MONOCYTES      5 - 13 %    EOSINOPHILS      0 - 7 %    BASOPHILS      0 - 1 %    ABS. NEUTROPHILS      1.8 - 8.0 K/UL    ABS. LYMPHOCYTES      0.8 - 3.5 K/UL    ABS. MONOCYTES      0.0 - 1.0 K/UL    ABS. EOSINOPHILS      0.0 - 0.4 K/UL    ABS. BASOPHILS      0.0 - 0.1 K/UL    Cholesterol, total      <200 MG/DL    Triglyceride      <150 MG/DL    HDL Cholesterol      MG/DL    LDL, calculated      0 - 100 MG/DL    VLDL, calculated      MG/DL    CHOL/HDL Ratio      0 - 5.0      Magnesium      1.6 - 2.4 mg/dL    Phosphorus      2.6 - 4.7 MG/DL      EXAM:  XR CHEST PORT     INDICATION:  Shortness of breath.     COMPARISON:  Chest x-ray 2/13/2012.     FINDINGS: A portable AP radiograph of the chest was obtained at 10:48 hours. The  patient is on a cardiac monitor. The lungs are clear. The cardiac and  mediastinal contours and pulmonary vascularity are normal.  The chest wall  structures and visualized upper abdomen show no acute findings with incidental  note of degenerative spine and shoulder changes.     IMPRESSION: No acute findings.   Assessment:     Hospital Problems  Date Reviewed: 8/1/2017          Codes Class Noted POA    Type 2 diabetes mellitus with ophthalmic complication, without long-term current use of insulin ([facility] ICD-10-CM: E11.39  ICD-9-CM: 250.50  8/1/2017 Unknown        Acute renal failure with tubular necrosis [facility]) ICD-10-CM: N17.0  ICD-9-CM: 584.5  8/1/2017 Unknown        DKA (diabetic ketoacidoses) ([facility] ICD-10-CM: E13.10  ICD-9-CM: 250.10  7/31/2017 Unknown              Plan:     1) DM > I discussed with pt and his wife at length that he will need to go back on insulin going home and we can follow up with him as an outpatient with the goal of minimizing his insulin regimen and finding an insulin and PO regimen, if able. He had failed a PO regimen in the past, but he was off insulin for ~7 weeks before his BGs became severely elevated. This makes me think he has some beta-cell function. Will check ADARSH and Anti-inuslin AB looking for evidence of autoimmune DM. At this time I don't feel checking C-peptide will be helpful as his BGs are doing ok and he has recived 450+ units of insulin in the last 24 hours. 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 Will start pt on Humalog 75/25 mix on his previous regimen of 14 units with breakfast and 34 units with dinner. RN instructed to give the Humalog WITH DINNER and to continue the insulin drip for 2 hours after receiving the Humalog mix. I spent 70 minutes of time on his case and > 50% of the time was spent reviewing the chart and coordinating his care with the nurse caring for him.         Signed By: Dirk Waters MD     August 1, 2017

## 2017-08-01 NOTE — PROGRESS NOTES
PCU SHIFT NURSING NOTE      1926: Bedside shift change report given to Hakan Davis RN (oncoming nurse) by David Mejia RN (offgoing nurse). Report included the following information SBAR, Kardex, ED Summary, Procedure Summary, Intake/Output, MAR and Recent Results. Shift Summary:   0730: Bedside shift change report given to David Mejia RN (oncoming nurse) by Nolvia Xiao RN (offgoing nurse). Report included the following information SBAR, Kardex, ED Summary and Intake/Output, MAR, Recent Results and Cardiac Rhythm: Sinus Tach. 1030: Called Dr. Yolette Morrissey. Spoke with him about patient's edema. Patient has +1 edema in his upper and lower extremities. Asked MD if patient's fluids rate can be decreased. Orders received from MD to decreased rate of fluids to 100 ml/hr. 1200: Diabetes Management at patient's bedside with family. 1300: Dr. Mechelle Mendez at bedside to see patient. Order received to start patient on 75/25 tonight at dinner and turn off insulin gtt about 2 hours after dose of 75/25.    1330: Spoke to Dr. Yolette Morrissey to get patient an order for Ibuprofen for a headache. Orders received for 600 mg TIB PRN Ibuprofen. 1700: 75/25 given. Will continue to monitor patient's BG.     1900: Insulin gtt stopped. . Will recheck in two hours. Patient in bed eating dinner. Admission Date 7/31/2017   Admission Diagnosis DKA (diabetic ketoacidoses) ([facility]   Consults IP CONSULT TO ENDOCRINOLOGY        Consults   []PT   []OT   []Speech   [x]Case Management      [] Palliative      Cardiac Monitoring Order   [x]Yes   []No     IV drips   [x]Yes    Drip:Insulin                  Dose:  Drip:                            Dose:  Drip:                            Dose:   []No     GI Prophylaxis   []Yes   [x]No         DVT Prophylaxis   SCDs:             Red stockings:         [x] Medication   []Contraindicated   []None      Activity Level Activity Level:  Up with Assistance     Activity Assistance: Partial (one person)   Purposeful Rounding every 1-2 hour? [x]Yes   Jimenez Score  Total Score: 1   Bed Alarm (If score 3 or >)   []Yes   [] Refused (See signed refusal form in chart)   Joey Score  Joey Score: 22   Joey Score (if score 14 or less)   []PMT consult   []Wound Care consult      []Specialty bed   [] Nutrition consult          Needs prior to discharge:   Home O2 required:    []Yes   [x]No    If yes, how much O2 required? Other:    Last Bowel Movement: Last Bowel Movement Date: 07/30/17      Influenza Vaccine Received Flu Vaccine for Current Season (usually Sept-March): Not Flu Season        Pneumonia Vaccine           Diet Active Orders   Diet    DIET CLEAR LIQUID      LDAs               Peripheral IV 07/31/17 Right Hand (Active)   Site Assessment Clean, dry, & intact 8/1/2017  4:55 AM   Phlebitis Assessment 0 8/1/2017  4:55 AM   Infiltration Assessment 0 8/1/2017  4:55 AM   Dressing Status Clean, dry, & intact 8/1/2017  4:55 AM   Dressing Type Transparent 7/31/2017  6:18 PM   Hub Color/Line Status Green 7/31/2017  6:18 PM       Peripheral IV 07/31/17 Left Hand (Active)   Site Assessment Clean, dry, & intact 8/1/2017  4:55 AM   Phlebitis Assessment 0 8/1/2017  4:55 AM   Infiltration Assessment 0 8/1/2017  4:55 AM   Dressing Status Clean, dry, & intact 8/1/2017  4:55 AM   Dressing Type Tape;Transparent 7/31/2017 11:14 AM                      Urinary Catheter      Intake & Output   Date 07/31/17 0700 - 08/01/17 0659 08/01/17 0700 - 08/02/17 0659   Shift 0700-1859 1900-0659 24 Hour Total 0700-1859 1900-0659 24 Hour Total   I  N  T  A  K  E   P. O. 0  0         P. O. 0  0       I.V.  (mL/kg/hr) 1552.7  (1.2)  1552.7  (0.6)         Insulin Volume 115.2  115.2         Volume (0.9% sodium chloride infusion) 1437.5  1437.5       Shift Total  (mL/kg) 1552.7  (14.1)  1552.7  (14.1)      O  U  T  P  U  T   Urine  (mL/kg/hr) 1625  (1.2) 1200  (0.9) 2825  (1.1)         Urine Voided 1625 1200 2825         Urine Occurrence(s) 1 x  1 x       Shift Total  (mL/kg) 1625  (14.8) 1200  (10.9) 2825  (25.7)      NET -72.3 -1200 -1272.3      Weight (kg) 110 110 110 110 110 110         Readmission Risk Assessment Tool Score Low Risk            8       Total Score        3 Has Seen PCP in Last 6 Months (Yes=3, No=0)    5 Charlson Comorbidity Score (Age + Comorbid Conditions)        Criteria that do not apply:    . Living with Significant Other. Assisted Living. LTAC. SNF. or   Rehab    Patient Length of Stay (>5 days = 3)    IP Visits Last 12 Months (1-3=4, 4=9, >4=11)    Pt.  Coverage (Medicare=5 , Medicaid, or Self-Pay=4)       Expected Length of Stay - - -   Actual Length of Stay 1

## 2017-08-01 NOTE — PROGRESS NOTES
Bedside shift change report given to Douglas Lucero (oncoming nurse) by Yanira Alexandra RN (offgoing nurse). Report included the following information SBAR, Kardex, Procedure Summary, Intake/Output, MAR, Recent Results and Cardiac Rhythm Lines.

## 2017-08-01 NOTE — TELEPHONE ENCOUNTER
Alina Zarate from [facility] is calling in a hospital consult.      Pt: Avery Staton  : [birth year]  Reason: DKA  Room: 2263  Phone: [phone]  Referring: Dr. Adarsh Lozano

## 2017-08-02 LAB
ALBUMIN SERPL BCP-MCNC: 2.6 G/DL (ref 3.5–5)
ALBUMIN/GLOB SERPL: 1 {RATIO} (ref 1.1–2.2)
ALP SERPL-CCNC: 89 U/L (ref 45–117)
ALT SERPL-CCNC: 45 U/L (ref 12–78)
ANION GAP BLD CALC-SCNC: 8 MMOL/L (ref 5–15)
AST SERPL W P-5'-P-CCNC: 27 U/L (ref 15–37)
BASOPHILS # BLD AUTO: 0 K/UL (ref 0–0.1)
BASOPHILS # BLD: 0 % (ref 0–1)
BILIRUB SERPL-MCNC: 0.7 MG/DL (ref 0.2–1)
BUN SERPL-MCNC: 15 MG/DL (ref 6–20)
BUN/CREAT SERPL: 11 (ref 12–20)
CALCIUM SERPL-MCNC: 7.8 MG/DL (ref 8.5–10.1)
CHLORIDE SERPL-SCNC: 110 MMOL/L (ref 97–108)
CO2 SERPL-SCNC: 24 MMOL/L (ref 21–32)
CREAT SERPL-MCNC: 1.41 MG/DL (ref 0.7–1.3)
EOSINOPHIL # BLD: 0 K/UL (ref 0–0.4)
EOSINOPHIL NFR BLD: 0 % (ref 0–7)
ERYTHROCYTE [DISTWIDTH] IN BLOOD BY AUTOMATED COUNT: 15 % (ref 11.5–14.5)
GLOBULIN SER CALC-MCNC: 2.5 G/DL (ref 2–4)
GLUCOSE BLD STRIP.AUTO-MCNC: 180 MG/DL (ref 65–100)
GLUCOSE BLD STRIP.AUTO-MCNC: 288 MG/DL (ref 65–100)
GLUCOSE BLD STRIP.AUTO-MCNC: 290 MG/DL (ref 65–100)
GLUCOSE BLD STRIP.AUTO-MCNC: 325 MG/DL (ref 65–100)
GLUCOSE SERPL-MCNC: 297 MG/DL (ref 65–100)
HCT VFR BLD AUTO: 34.2 % (ref 36.6–50.3)
HGB BLD-MCNC: 12.1 G/DL (ref 12.1–17)
LYMPHOCYTES # BLD AUTO: 21 % (ref 12–49)
LYMPHOCYTES # BLD: 1.9 K/UL (ref 0.8–3.5)
MCH RBC QN AUTO: 30.6 PG (ref 26–34)
MCHC RBC AUTO-ENTMCNC: 35.4 G/DL (ref 30–36.5)
MCV RBC AUTO: 86.6 FL (ref 80–99)
MONOCYTES # BLD: 0.3 K/UL (ref 0–1)
MONOCYTES NFR BLD AUTO: 3 % (ref 5–13)
NEUTS SEG # BLD: 6.6 K/UL (ref 1.8–8)
NEUTS SEG NFR BLD AUTO: 76 % (ref 32–75)
PLATELET # BLD AUTO: 98 K/UL (ref 150–400)
POTASSIUM SERPL-SCNC: 3.6 MMOL/L (ref 3.5–5.1)
PROT SERPL-MCNC: 5.1 G/DL (ref 6.4–8.2)
RBC # BLD AUTO: 3.95 M/UL (ref 4.1–5.7)
SERVICE CMNT-IMP: ABNORMAL
SODIUM SERPL-SCNC: 142 MMOL/L (ref 136–145)
WBC # BLD AUTO: 8.7 K/UL (ref 4.1–11.1)

## 2017-08-02 PROCEDURE — 82962 GLUCOSE BLOOD TEST: CPT

## 2017-08-02 PROCEDURE — 36415 COLL VENOUS BLD VENIPUNCTURE: CPT | Performed by: FAMILY MEDICINE

## 2017-08-02 PROCEDURE — 74011636637 HC RX REV CODE- 636/637: Performed by: FAMILY MEDICINE

## 2017-08-02 PROCEDURE — 85025 COMPLETE CBC W/AUTO DIFF WBC: CPT | Performed by: FAMILY MEDICINE

## 2017-08-02 PROCEDURE — 74011636637 HC RX REV CODE- 636/637: Performed by: INTERNAL MEDICINE

## 2017-08-02 PROCEDURE — 65270000029 HC RM PRIVATE

## 2017-08-02 PROCEDURE — 74011250636 HC RX REV CODE- 250/636: Performed by: INTERNAL MEDICINE

## 2017-08-02 PROCEDURE — 80053 COMPREHEN METABOLIC PANEL: CPT | Performed by: FAMILY MEDICINE

## 2017-08-02 RX ORDER — INSULIN LISPRO 100 [IU]/ML
INJECTION, SOLUTION INTRAVENOUS; SUBCUTANEOUS
Status: DISCONTINUED | OUTPATIENT
Start: 2017-08-02 | End: 2017-08-03 | Stop reason: HOSPADM

## 2017-08-02 RX ORDER — DEXTROSE 50 % IN WATER (D50W) INTRAVENOUS SYRINGE
12.5-25 AS NEEDED
Status: DISCONTINUED | OUTPATIENT
Start: 2017-08-02 | End: 2017-08-03 | Stop reason: HOSPADM

## 2017-08-02 RX ORDER — MAGNESIUM SULFATE 100 %
4 CRYSTALS MISCELLANEOUS AS NEEDED
Status: DISCONTINUED | OUTPATIENT
Start: 2017-08-02 | End: 2017-08-03 | Stop reason: HOSPADM

## 2017-08-02 RX ADMIN — INSULIN LISPRO 5 UNITS: 100 INJECTION, SOLUTION INTRAVENOUS; SUBCUTANEOUS at 17:04

## 2017-08-02 RX ADMIN — INSULIN LISPRO 40 UNITS: 100 INJECTION, SUSPENSION SUBCUTANEOUS at 17:04

## 2017-08-02 RX ADMIN — ENOXAPARIN SODIUM 40 MG: 40 INJECTION SUBCUTANEOUS at 16:32

## 2017-08-02 RX ADMIN — INSULIN LISPRO 14 UNITS: 100 INJECTION, SUSPENSION SUBCUTANEOUS at 08:47

## 2017-08-02 RX ADMIN — INSULIN LISPRO 7 UNITS: 100 INJECTION, SOLUTION INTRAVENOUS; SUBCUTANEOUS at 11:57

## 2017-08-02 RX ADMIN — ONDANSETRON 4 MG: 2 INJECTION INTRAMUSCULAR; INTRAVENOUS at 06:50

## 2017-08-02 RX ADMIN — INSULIN LISPRO 5 UNITS: 100 INJECTION, SOLUTION INTRAVENOUS; SUBCUTANEOUS at 08:46

## 2017-08-02 NOTE — PROGRESS NOTES
Progress Note    Patient: Regino Muller MRN: [id number]  SSN: [id number]    YOB: 1970  Age: 51 y.o. Sex: male      Admit Date: 7/31/2017    LOS: 2 days     Subjective:     No acute events overnight. Pt notes he has been having some nausea and epigastric discomfort overnight as well as continued sore throat. He denies vomiting, CP, SOB, HA. His BGs increased overnight showing that the 34 units of Humalog 75:25 was insufficient to cover his dinner. Objective:     Vitals:    08/01/17 1543 08/01/17 1929 08/01/17 2109 08/01/17 2309   BP: 124/75  141/75 141/77   Pulse: 96 (!) 114 (!) 106 (!) 101   Resp: 18  17 16   Temp: 98 °F (36.7 °C)  98.6 °F (37 °C) 98.5 °F (36.9 °C)   SpO2: 96% 97% 94% 96%   Weight:            Intake and Output:  Current Shift:    Last three shifts: 07/31 1901 - 08/02 0700  In: 3874.5 [P.O.:510; I.V.:3364.5]  Out: 1200 [Urine:1200]    Physical Exam:   GENERAL: alert, cooperative, mild distress, appears stated age  ABDOMEN: epigastric tenderness  EXTREMITIES:  extremities normal, atraumatic, no cyanosis or edema  SKIN: Normal.    Lab/Data Review: All lab results for the last 24 hours reviewed. Assessment:     Active Problems:    DKA (diabetic ketoacidoses) ([address] (7/31/2017)      Type 2 diabetes mellitus with ophthalmic complication, without long-term current use of insulin ([address] (8/1/2017)      Acute renal failure with tubular necrosis ([address] (8/1/2017)        Plan:     1) DM > Will increase the dinner dose of his Humalog 75:25 from 36 units to 40 units. I suspect his morning dose of 14 units will not be sufficient, but at this time I want to see how his BGs respond to the dose before making any changes. Dr. Clarke Pinzon started a correction scale of Humalog, which will help give us an idea of how to adjust his 75:25 dose, based on the amount of correction needed.     Will continue to follow    I spent 25 minutes of face to face time on his case and > 50% of the time was spent reviewing the chart and coordinating his care with the nurse caring for him.         Signed By: Lizeth Barroso MD     August 2, 2017

## 2017-08-02 NOTE — PROGRESS NOTES
Patient c/o sore throat at start of my shift and I offered to page MD to get Chloraseptic spray. I paged hospitalist service seeing they followed the patient on yesterday. Dr Francine Sicard returned cary and advised no longer covering will need to page  [facility] service. Paged and waited for Dr Clarice Daley the on call MD to return call and no repsonse. Paged Endocrinology and now awaiting for return call. [phone] Patient medicated with Ibuprofen for c/o sore throat.

## 2017-08-02 NOTE — PROGRESS NOTES
Bedside shift change report given to Yariel Rasmussen (oncoming nurse) by Sonny Silva RN (offgoing nurse). Report included the following information SBAR, Kardex, Intake/Output and Recent Results.

## 2017-08-02 NOTE — PROGRESS NOTES
Bedside report received from [facility]  and care assumed. Report given with SBAR , recent labs, land MAR . Pt resting in bed c/o throat soreness using chloraseptic spray at bedside.

## 2017-08-02 NOTE — PROGRESS NOTES
General Daily Progress Note    Admit Date: 7/31/2017  Hospital day 3    Subjective:     Patient has  Fatigue ,sore throat. .   Medication side effects: none    Current Facility-Administered Medications   Medication Dose Route Frequency    insulin lispro (HUMALOG) injection   SubCUTAneous AC&HS    glucose chewable tablet 16 g  4 Tab Oral PRN    dextrose (D50W) injection syrg 12.5-25 g  12.5-25 g IntraVENous PRN    glucagon (GLUCAGEN) injection 1 mg  1 mg IntraMUSCular PRN    insulin lispro protamine/insulin lispro (HUMALOG MIX 75/25) injection 14 Units  14 Units SubCUTAneous ACB    insulin lispro protamine/insulin lispro (HUMALOG MIX 75/25) injection 30 Units  30 Units SubCUTAneous ACD    dextrose 5% and 0.9% NaCl infusion  50 mL/hr IntraVENous CONTINUOUS    insulin lispro protamine/insulin lispro (HUMALOG MIX 75/25) injection 34 Units  34 Units SubCUTAneous ACD    insulin lispro protamine/insulin lispro (HUMALOG MIX 75/25) injection 14 Units  14 Units SubCUTAneous ACB    ibuprofen (MOTRIN) tablet 600 mg  600 mg Oral TID PRN    phenol throat spray (CHLORASEPTIC) 1 Spray  1 Spray Oral PRN    sodium chloride (NS) flush 5-10 mL  5-10 mL IntraVENous PRN    sodium chloride (NS) flush 5-10 mL  5-10 mL IntraVENous PRN    glucose chewable tablet 16 g  4 Tab Oral PRN    dextrose (D50W) injection syrg 12.5-25 g  12.5-25 g IntraVENous PRN    glucagon (GLUCAGEN) injection 1 mg  1 mg IntraMUSCular PRN    acetaminophen (TYLENOL) tablet 650 mg  650 mg Oral Q4H PRN    ondansetron (ZOFRAN) injection 4 mg  4 mg IntraVENous Q4H PRN    enoxaparin (LOVENOX) injection 40 mg  40 mg SubCUTAneous Q24H        Review of Systems  Constitutional: positive for fatigue and malaise  Respiratory: negative  Cardiovascular: negative  Gastrointestinal: negative    Objective:     Patient Vitals for the past 8 hrs:   BP Temp Pulse Resp SpO2   08/01/17 2309 141/77 98.5 °F (36.9 °C) (!) 101 16 96 %        07/31 1901 - 08/02 0700  In: 3874.5 [P.O.:510; I.V.:3364.5]  Out: 1200 [Urine:1200]    Physical Exam:   Visit Vitals    /77 (BP 1 Location: Left arm, BP Patient Position: At rest)    Pulse (!) 101    Temp 98.5 °F (36.9 °C)    Resp 16    Wt 242 lb 8.1 oz (110 kg)    SpO2 96%    BMI 33.82 kg/m2     General appearance: alert, fatigued, cooperative, no distress, appears stated age  Lungs: clear to auscultation bilaterally  Heart: regular rate and rhythm  Abdomen: soft, non-tender.  Bowel sounds normal. No masses,  no organomegaly      ECG: normal sinus rhythm     Data Review   Recent Results (from the past 24 hour(s))   GLUCOSE, POC    Collection Time: 08/01/17  7:42 AM   Result Value Ref Range    Glucose (POC) 125 (H) 65 - 100 mg/dL    Performed by Marilia Ugarte    Collection Time: 08/01/17  7:42 AM   Result Value Ref Range    Glucose 125 mg/dL    Insulin order 3.6 units/hour    Insulin adminstered 3.6 units/hour    Multiplier 0.056     Low target 150 mg/dL    High target 250 mg/dL    D50 order 0.0 ml    D50 administered 0.00 ml    Minutes until next BG 60 min    Order initials LF      Administered initials LF     GLSCOM Comments     GLUCOSE, POC    Collection Time: 08/01/17  8:49 AM   Result Value Ref Range    Glucose (POC) 125 (H) 65 - 100 mg/dL    Performed by Catarino Rodriguez    Collection Time: 08/01/17  8:50 AM   Result Value Ref Range    Glucose 125 mg/dL    Insulin order 2.9 units/hour    Insulin adminstered 2.9 units/hour    Multiplier 0.045     Low target 150 mg/dL    High target 250 mg/dL    D50 order 0.0 ml    D50 administered 0.00 ml    Minutes until next BG 60 min    Order initials LF     Administered initials LF     GLSCOM Comments     GLUCOSE, POC    Collection Time: 08/01/17 10:03 AM   Result Value Ref Range    Glucose (POC) 154 (H) 65 - 100 mg/dL    Performed by Catarino Rodriguez    Collection Time: 08/01/17 10:04 AM   Result Value Ref Range    Glucose 154 mg/dL    Insulin order 4.2 units/hour    Insulin adminstered 4.2 units/hour    Multiplier 0.045     Low target 150 mg/dL    High target 250 mg/dL    D50 order 0.0 ml    D50 administered 0.00 ml    Minutes until next BG 60 min    Order initials LF     Administered initials LF     GLSCOM Comments     GLUCOSE, POC    Collection Time: 08/01/17 11:31 AM   Result Value Ref Range    Glucose (POC) 147 (H) 65 - 100 mg/dL    Performed by Garrett Kaleb    Collection Time: 08/01/17 11:32 AM   Result Value Ref Range    Glucose 147 mg/dL    Insulin order 3.0 units/hour    Insulin adminstered 3.0 units/hour    Multiplier 0.035     Low target 150 mg/dL    High target 250 mg/dL    D50 order 0.0 ml    D50 administered 0.00 ml    Minutes until next BG 60 min    Order initials LF     Administered initials LF     GLSCOM Comments     GLUCOSE, POC    Collection Time: 08/01/17 12:54 PM   Result Value Ref Range    Glucose (POC) 135 (H) 65 - 100 mg/dL    Performed by Cinthya Lamar    Collection Time: 08/01/17  1:06 PM   Result Value Ref Range    Glucose 135 mg/dL    Insulin order 1.9 units/hour    Insulin adminstered 1.9 units/hour    Multiplier 0.025     Low target 150 mg/dL    High target 250 mg/dL    D50 order 0.0 ml    D50 administered 0.00 ml    Minutes until next BG 60 min    Order initials LF     Administered initials LF     GLSCOM Comments     GLUCOSE, POC    Collection Time: 08/01/17  2:24 PM   Result Value Ref Range    Glucose (POC) 158 (H) 65 - 100 mg/dL    Performed by Garrett Manuel    Collection Time: 08/01/17  2:25 PM   Result Value Ref Range    Glucose 158 mg/dL    Insulin order 2.5 units/hour    Insulin adminstered 2.5 units/hour    Multiplier 0.025     Low target 150 mg/dL    High target 250 mg/dL    D50 order 0.0 ml    D50 administered 0.00 ml    Minutes until next BG 60 min    Order initials LF     Administered initials LF     GLSCOM Comments GLUCOSE, POC    Collection Time: 08/01/17  3:34 PM   Result Value Ref Range    Glucose (POC) 172 (H) 65 - 100 mg/dL    Performed by Len Lance    Collection Time: 08/01/17  3:36 PM   Result Value Ref Range    Glucose 172 mg/dL    Insulin order 2.8 units/hour    Insulin adminstered 2.8 units/hour    Multiplier 0.025     Low target 150 mg/dL    High target 250 mg/dL    D50 order 0.0 ml    D50 administered 0.00 ml    Minutes until next BG 60 min    Order initials LF     Administered initials      GLSCOM Comments     GLUCOSE, POC    Collection Time: 08/01/17  4:41 PM   Result Value Ref Range    Glucose (POC) 199 (H) 65 - 100 mg/dL    Performed by Walter Yap    Collection Time: 08/01/17  4:57 PM   Result Value Ref Range    Glucose 199 mg/dL    Insulin order 3.5 units/hour    Insulin adminstered 3.5 units/hour    Multiplier 0.025     Low target 150 mg/dL    High target 250 mg/dL    D50 order 0.0 ml    D50 administered 0.00 ml    Minutes until next BG 60 min    Order initials LF     Administered initials      GLSCOM Comments     GLUCOSE, POC    Collection Time: 08/01/17  6:04 PM   Result Value Ref Range    Glucose (POC) 175 (H) 65 - 100 mg/dL    Performed by Len Lance    Collection Time: 08/01/17  6:05 PM   Result Value Ref Range    Glucose 175 mg/dL    Insulin order 2.9 units/hour    Insulin adminstered 2.9 units/hour    Multiplier 0.025     Low target 150 mg/dL    High target 250 mg/dL    D50 order 0.0 ml    D50 administered 0.00 ml    Minutes until next BG 60 min    Order initials LF     Administered initials      GLSCOM Comments     GLUCOSE, POC    Collection Time: 08/01/17  6:58 PM   Result Value Ref Range    Glucose (POC) 187 (H) 65 - 100 mg/dL    Performed by [address], POC    Collection Time: 08/01/17  9:10 PM   Result Value Ref Range    Glucose (POC) 260 (H) 65 - 100 mg/dL    Performed by Ok Abdullahi METABOLIC PANEL, COMPREHENSIVE    Collection Time: 08/02/17  4:28 AM   Result Value Ref Range    Sodium 142 136 - 145 mmol/L    Potassium 3.6 3.5 - 5.1 mmol/L    Chloride 110 (H) 97 - 108 mmol/L    CO2 24 21 - 32 mmol/L    Anion gap 8 5 - 15 mmol/L    Glucose 297 (H) 65 - 100 mg/dL    BUN 15 6 - 20 MG/DL    Creatinine 1.41 (H) 0.70 - 1.30 MG/DL    BUN/Creatinine ratio 11 (L) 12 - 20      GFR est AA >60 >60 ml/min/1.73m2    GFR est non-AA 54 (L) >60 ml/min/1.73m2    Calcium 7.8 (L) 8.5 - 10.1 MG/DL    Bilirubin, total 0.7 0.2 - 1.0 MG/DL    ALT (SGPT) 45 12 - 78 U/L    AST (SGOT) 27 15 - 37 U/L    Alk. phosphatase 89 45 - 117 U/L    Protein, total 5.1 (L) 6.4 - 8.2 g/dL    Albumin 2.6 (L) 3.5 - 5.0 g/dL    Globulin 2.5 2.0 - 4.0 g/dL    A-G Ratio 1.0 (L) 1.1 - 2.2     CBC WITH AUTOMATED DIFF    Collection Time: 08/02/17  4:28 AM   Result Value Ref Range    WBC 8.7 4.1 - 11.1 K/uL    RBC 3.95 (L) 4.10 - 5.70 M/uL    HGB 12.1 12.1 - 17.0 g/dL    HCT 34.2 (L) 36.6 - 50.3 %    MCV 86.6 80.0 - 99.0 FL    MCH 30.6 26.0 - 34.0 PG    MCHC 35.4 30.0 - 36.5 g/dL    RDW 15.0 (H) 11.5 - 14.5 %    PLATELET 98 (L) 891 - 400 K/uL    NEUTROPHILS 76 (H) 32 - 75 %    LYMPHOCYTES 21 12 - 49 %    MONOCYTES 3 (L) 5 - 13 %    EOSINOPHILS 0 0 - 7 %    BASOPHILS 0 0 - 1 %    ABS. NEUTROPHILS 6.6 1.8 - 8.0 K/UL    ABS. LYMPHOCYTES 1.9 0.8 - 3.5 K/UL    ABS. MONOCYTES 0.3 0.0 - 1.0 K/UL    ABS. EOSINOPHILS 0.0 0.0 - 0.4 K/UL    ABS. BASOPHILS 0.0 0.0 - 0.1 K/UL           Assessment:     Active Problems:    DKA (diabetic ketoacidoses) ([facility] (7/31/2017)      Type 2 diabetes mellitus with ophthalmic complication, without long-term current use of insulin ([facility] (8/1/2017)      Acute renal failure with tubular necrosis ([facility] (8/1/2017)        Plan:     Feeling better,quite weak,will start Humalog mix plus ssi. D/C telemetry

## 2017-08-02 NOTE — PROGRESS NOTES
Bedside and Verbal shift change report received from Dawson Mcmanus RN. Report included the following information SBAR, Kardex, Intake/Output, MAR, Accordion, Recent Results, Med Rec Status and Cardiac Rhythm NSR/ST.

## 2017-08-02 NOTE — PROGRESS NOTES
Spiritual Care Partner Volunteer visited patient in [address] on 8/2/17.   Documented by:  Daylin Morgan [id number] [facility] Jolie ([id number])

## 2017-08-02 NOTE — PROGRESS NOTES
Bedside and Verbal shift change report given to Delgado Martinez RN (oncoming nurse) by Rosalie Ford RN (offgoing nurse). Report included the following information SBAR. Kardex, I/O,s, Cardiac Rhythm ST     Passed on to oncoming nurse the difficulty of reaching a physician on this case. Multiple attempts made to call attending and endocrinologists. Patient has no sliding scale insulin coverage. IVF order still had dextrose in maintenance fluids.  Reported to oncoming nurse to address

## 2017-08-02 NOTE — PROGRESS NOTES
PCU SHIFT NURSING NOTE      1500: Bedside shift change report given to ISRAEL Ku (oncoming nurse) by Sangeetha Bhat RN (offgoing nurse). Report included the following information SBAR, Kardex, ED Summary, Intake/Output, MAR, Recent Results and Cardiac Rhythm Sinus Tach. Shift Summary:   0730: Bedside shift change report given to Sangeetha Bhat RN (oncoming nurse) by ISRAEL Green (offgoing nurse). Report included the following information SBAR, Kardex, ED Summary, Intake/Output, MAR, Recent Results and Cardiac Rhythm NSR. Dr. Eddie Koenig at bedside. Orders modified for patient's insulin dose. 0830: Orders to remove patient's telemetry. Telemetry removed. 1300: Spoke with Dr. Kayley Garcia, patient is cleared to be transferred to a medical floor. Transfer order placed. Admission Date 7/31/2017   Admission Diagnosis DKA (diabetic ketoacidoses) ([facility]   Consults IP CONSULT TO ENDOCRINOLOGY        Consults   []PT   []OT   []Speech   [x]Case Management      [] Palliative      Cardiac Monitoring Order   [x]Yes   []No     IV drips   []Yes    Drip:                            Dose:  Drip:                            Dose:  Drip:                            Dose:   [x]No     GI Prophylaxis   []Yes   []No         DVT Prophylaxis   SCDs:             Red stockings:         [x] Medication   []Contraindicated   []None      Activity Level Activity Level: Up ad carmen     Activity Assistance: No assistance needed   Purposeful Rounding every 1-2 hour? [x]Yes   Jimenez Score  Total Score: 0   Bed Alarm (If score 3 or >)   []Yes   [] Refused (See signed refusal form in chart)   Joey Score  Joey Score: 22   Joey Score (if score 14 or less)   []PMT consult   []Wound Care consult      []Specialty bed   [] Nutrition consult          Needs prior to discharge:   Home O2 required:    []Yes   [x]No    If yes, how much O2 required?     Other:    Last Bowel Movement: Last Bowel Movement Date: 07/31/17      Influenza Vaccine Received Flu Vaccine for Current Season (usually Sept-March): Not Flu Season        Pneumonia Vaccine           Diet Active Orders   Diet    DIET DIABETIC CONSISTENT CARB Regular      LDAs               Peripheral IV 07/31/17 Right Hand (Active)   Site Assessment Clean, dry, & intact 8/2/2017  7:23 AM   Phlebitis Assessment 0 8/2/2017  7:23 AM   Infiltration Assessment 0 8/2/2017  7:23 AM   Dressing Status Clean, dry, & intact 8/2/2017  7:23 AM   Dressing Type Transparent;Tape 8/2/2017  7:23 AM   Hub Color/Line Status Green;Capped;Flushed 8/2/2017  7:23 AM   Action Taken Open ports on tubing capped 8/1/2017  8:30 PM   Alcohol Cap Used Yes 8/1/2017  8:30 PM       Peripheral IV 07/31/17 Left Hand (Active)   Site Assessment Clean, dry, & intact 8/2/2017  7:23 AM   Phlebitis Assessment 0 8/2/2017  7:23 AM   Infiltration Assessment 0 8/2/2017  7:23 AM   Dressing Status Clean, dry, & intact 8/2/2017  7:23 AM   Dressing Type Transparent;Tape 8/2/2017  7:23 AM   Hub Color/Line Status Pink;Capped;Flushed 8/2/2017  7:23 AM   Action Taken Open ports on tubing capped 8/1/2017  8:30 PM   Alcohol Cap Used Yes 8/1/2017  8:30 PM                      Urinary Catheter      Intake & Output   Date 08/01/17 0700 - 08/02/17 0659 08/02/17 0700 - 08/03/17 0659   Shift 3267-2294 5506-0661 24 Hour Total 0700-1859 4998-4863 24 Hour Total   I  N  T  A  K  E   P.O. 150 360 510         P. O. 150 360 510       I.V.  (mL/kg/hr)  3364.5  (2.5) 3364.5  (1.3)         Insulin Volume  303.7 303.7         Volume (dextrose 5% and 0.9% NaCl infusion)  3060.8 3060.8       Shift Total  (mL/kg) 150  (1.4) 3724.5  (33.9) 3874.5  (35.2)      O  U  T  P  U  T   Shift Total  (mL/kg)          3724.5 3874.5      Weight (kg) 110 110 110 110 110 110         Readmission Risk Assessment Tool Score Low Risk            8       Total Score        3 Has Seen PCP in Last 6 Months (Yes=3, No=0)    5 Charlson Comorbidity Score (Age + Comorbid Conditions)        Criteria that do not apply: . Living with Significant Other. Assisted Living. LTAC. SNF. or   Rehab    Patient Length of Stay (>5 days = 3)    IP Visits Last 12 Months (1-3=4, 4=9, >4=11)    Pt.  Coverage (Medicare=5 , Medicaid, or Self-Pay=4)       Expected Length of Stay 4d 0h   Actual Length of Stay 2

## 2017-08-02 NOTE — PROGRESS NOTES
1920- Bedside and Verbal shift change report given to Nathenjeyson Medina RN (oncoming nurse) by Dollie Closs RN (offgoing nurse). Report included the following information SBAR, Kardex, ED Summary, Procedure Summary, Intake/Output, MAR, Accordion, Recent Results and Med Rec Status. Assumed care of patient, CB in reach, family at bedside. 2110- TRANSFER - OUT REPORT:  Verbal report given to [address] [place] [address] Jolie RN(name) on Urmila Morrissey  being transferred to Renal(unit) for routine progression of care     Report consisted of patients Situation, Background, Assessment and   Recommendations(SBAR). Information from the following report(s) SBAR, Kardex, ED Summary, Procedure Summary, Intake/Output, MAR, Accordion, Recent Results and Med Rec Status was reviewed with the receiving nurse. Lines:   Peripheral IV 07/31/17 Right Hand (Active)   Site Assessment Clean, dry, & intact 8/2/2017  8:00 PM   Phlebitis Assessment 0 8/2/2017  8:00 PM   Infiltration Assessment 0 8/2/2017  8:00 PM   Dressing Status Clean, dry, & intact 8/2/2017  8:00 PM   Dressing Type Tape;Transparent 8/2/2017  8:00 PM   Hub Color/Line Status Green;Capped 8/2/2017  8:00 PM   Action Taken Open ports on tubing capped 8/1/2017  8:30 PM   Alcohol Cap Used Yes 8/1/2017  8:30 PM       Peripheral IV 07/31/17 Left Hand (Active)   Site Assessment Clean, dry, & intact 8/2/2017  8:00 PM   Phlebitis Assessment 0 8/2/2017  8:00 PM   Infiltration Assessment 0 8/2/2017  8:00 PM   Dressing Status Clean, dry, & intact 8/2/2017  8:00 PM   Dressing Type Tape;Transparent 8/2/2017  8:00 PM   Hub Color/Line Status Pink; Infusing 8/2/2017  8:00 PM   Action Taken Open ports on tubing capped 8/1/2017  8:30 PM   Alcohol Cap Used Yes 8/2/2017  3:38 PM      Opportunity for questions and clarification was provided. Patient transported with:   Registered Nurse     2130- Patient transferred to [id number] via wheelchair with RN staff and family present.  Patient assisted to bed and placed in position of comfort. Family and CB in reach.

## 2017-08-03 ENCOUNTER — PATIENT OUTREACH (OUTPATIENT)
Dept: FAMILY MEDICINE CLINIC | Age: 47
End: 2017-08-03

## 2017-08-03 VITALS
TEMPERATURE: 98.3 F | WEIGHT: 242.51 LBS | SYSTOLIC BLOOD PRESSURE: 145 MMHG | DIASTOLIC BLOOD PRESSURE: 93 MMHG | OXYGEN SATURATION: 98 % | HEART RATE: 81 BPM | RESPIRATION RATE: 18 BRPM | BODY MASS INDEX: 33.82 KG/M2

## 2017-08-03 PROBLEM — R11.12 PROJECTILE VOMITING WITH NAUSEA: Status: ACTIVE | Noted: 2017-08-03

## 2017-08-03 LAB
ALBUMIN SERPL BCP-MCNC: 2.7 G/DL (ref 3.5–5)
ALBUMIN/GLOB SERPL: 1 {RATIO} (ref 1.1–2.2)
ALP SERPL-CCNC: 92 U/L (ref 45–117)
ALT SERPL-CCNC: 39 U/L (ref 12–78)
ANION GAP BLD CALC-SCNC: 4 MMOL/L (ref 5–15)
AST SERPL W P-5'-P-CCNC: 23 U/L (ref 15–37)
B-OH-BUTYR SERPL-MCNC: 165 MG/DL
BILIRUB SERPL-MCNC: 0.9 MG/DL (ref 0.2–1)
BUN SERPL-MCNC: 12 MG/DL (ref 6–20)
BUN/CREAT SERPL: 11 (ref 12–20)
CALCIUM SERPL-MCNC: 8.2 MG/DL (ref 8.5–10.1)
CHLORIDE SERPL-SCNC: 104 MMOL/L (ref 97–108)
CO2 SERPL-SCNC: 32 MMOL/L (ref 21–32)
CREAT SERPL-MCNC: 1.05 MG/DL (ref 0.7–1.3)
GLOBULIN SER CALC-MCNC: 2.8 G/DL (ref 2–4)
GLUCOSE BLD STRIP.AUTO-MCNC: 289 MG/DL (ref 65–100)
GLUCOSE SERPL-MCNC: 194 MG/DL (ref 65–100)
POTASSIUM SERPL-SCNC: 3.2 MMOL/L (ref 3.5–5.1)
PROT SERPL-MCNC: 5.5 G/DL (ref 6.4–8.2)
SERVICE CMNT-IMP: ABNORMAL
SODIUM SERPL-SCNC: 140 MMOL/L (ref 136–145)

## 2017-08-03 PROCEDURE — 36415 COLL VENOUS BLD VENIPUNCTURE: CPT | Performed by: FAMILY MEDICINE

## 2017-08-03 PROCEDURE — 74011250637 HC RX REV CODE- 250/637: Performed by: FAMILY MEDICINE

## 2017-08-03 PROCEDURE — 74011636637 HC RX REV CODE- 636/637: Performed by: FAMILY MEDICINE

## 2017-08-03 PROCEDURE — 74011636637 HC RX REV CODE- 636/637: Performed by: INTERNAL MEDICINE

## 2017-08-03 PROCEDURE — 82962 GLUCOSE BLOOD TEST: CPT

## 2017-08-03 PROCEDURE — 80053 COMPREHEN METABOLIC PANEL: CPT | Performed by: FAMILY MEDICINE

## 2017-08-03 PROCEDURE — 74011000258 HC RX REV CODE- 258: Performed by: FAMILY MEDICINE

## 2017-08-03 RX ORDER — POTASSIUM CHLORIDE 750 MG/1
40 TABLET, FILM COATED, EXTENDED RELEASE ORAL
Status: COMPLETED | OUTPATIENT
Start: 2017-08-03 | End: 2017-08-03

## 2017-08-03 RX ADMIN — INSULIN LISPRO 5 UNITS: 100 INJECTION, SOLUTION INTRAVENOUS; SUBCUTANEOUS at 08:20

## 2017-08-03 RX ADMIN — POTASSIUM CHLORIDE 40 MEQ: 750 TABLET, FILM COATED, EXTENDED RELEASE ORAL at 07:36

## 2017-08-03 RX ADMIN — INSULIN LISPRO 20 UNITS: 100 INJECTION, SUSPENSION SUBCUTANEOUS at 08:20

## 2017-08-03 RX ADMIN — DEXTROSE MONOHYDRATE AND SODIUM CHLORIDE 50 ML/HR: 5; .9 INJECTION, SOLUTION INTRAVENOUS at 02:16

## 2017-08-03 NOTE — PROGRESS NOTES
Bedside and Verbal shift change report given to Kenneth Bradshaw (oncoming nurse) by Tia Barclay RN (offgoing nurse). Report included the following information Kardex, MAR and Recent Results. Zone Phone for oncoming shift:   [id number]    Shift Summary: Resting quietly at this time    LDAs               Peripheral IV 07/31/17 Right Hand (Active)   Site Assessment Clean, dry, & intact 8/2/2017  9:40 PM   Phlebitis Assessment 0 8/2/2017  9:40 PM   Infiltration Assessment 0 8/2/2017  9:40 PM   Dressing Status Clean, dry, & intact 8/2/2017  9:40 PM   Dressing Type Transparent 8/2/2017  9:40 PM   Hub Color/Line Status Green;Capped 8/2/2017  9:40 PM   Action Taken Open ports on tubing capped 8/1/2017  8:30 PM   Alcohol Cap Used Yes 8/2/2017  9:40 PM       Peripheral IV 07/31/17 Left Hand (Active)   Site Assessment Clean, dry, & intact 8/2/2017  9:40 PM   Phlebitis Assessment 0 8/2/2017  9:40 PM   Infiltration Assessment 0 8/2/2017  9:40 PM   Dressing Status Clean, dry, & intact 8/2/2017  9:40 PM   Dressing Type Transparent 8/2/2017  9:40 PM   Hub Color/Line Status Pink; Infusing 8/2/2017  9:40 PM   Action Taken Open ports on tubing capped 8/1/2017  8:30 PM   Alcohol Cap Used Yes 8/2/2017  3:38 PM                        Intake & Output   Date 08/01/17 1900 - 08/02/17 0659 08/02/17 0700 - 08/03/17 0659   Shift 6082-9378 24 Hour Total 6901-5322 2133-7911 24 Hour Total   I  N  T  A  K  E   P.O. 360 510 740  740      P. O. 360 510 740  740    I.V.  (mL/kg/hr) 3364.5 3364.5 1305.8  (1)  1305.8      Insulin Volume 303.7 303.7         Volume (dextrose 5% and 0.9% NaCl infusion) 3060.8 3060.8 1305.8  1305.8    Shift Total  (mL/kg) 3724.5  (33.9) 3874.5  (35.2) 2045.8  (18.6)  2045.8  (18.6)   O  U  T  P  U  T   Urine  (mL/kg/hr)   1500  (1.1) 650 2150      Urine Voided   9535 350 8837      Urine Occurrence(s)    1 x 1 x    Shift Total  (mL/kg)   1500  (13.6) 650  (5.9) 2150  (19.5)   NET 3724.5 3874.5 545.8 -650 -104.2   Weight (kg) 110 110 110 110 110 Last Bowel Movement Last Bowel Movement Date: 07/31/17   Glucose Checks [] N/A  [x] AC/HS  [] Q6  Concerns:   Nutrition Active Orders   Diet    DIET DIABETIC CONSISTENT CARB Regular       Consults []PT  []OT  []Speech  []Case Management   Cardiac Monitoring [x]N/A []Yes Expires:

## 2017-08-03 NOTE — PROGRESS NOTES
PCP f/u has been scheduled with Dr. Shayla Cardona for Aug. 9 at 11:15am   I talked to Shruthi Sutherland at Dr. Monica Parham office about getting the patient seen sooner. As she was helping me, her computer went down. I gave Shruthi Sutherland the patients name,  and phone numbers and she is going to call the patient if she is able to get him in before his scheduled appointment on Aug. 9  Talked to Shruthi Sutherland and she was able to get the patient in on Monday, Aug 7 at 4:15. I called and talked to the patient on his cell phone at about 11:15am and gave him his new f/u day and time with Dr. Shayla Cardona. I repeated the day and time two times and I heard him repeat the info to someone else.   Patient was fine with this new f/u

## 2017-08-03 NOTE — DISCHARGE SUMMARY
Physician Discharge Summary       Patient: Irving Espinoza MRN: [id number]  SSN: [id number]    YOB: 1970  Age: 51 y.o. Sex: male    PCP: Sun Villalpando MD    Admit date: 7/31/2017  Admitting Provider: Jonathan Butler MD    Discharge date: 8/3/2017  Discharging Provider: Sun Villalpando MD    * Admission Diagnoses: DKA (diabetic ketoacidoses) [facility])    * Discharge Diagnoses:    Hospital Problems as of 8/3/2017  Date Reviewed: 8/3/2017          Codes Class Noted - Resolved POA    Projectile vomiting with nausea ICD-10-CM: R11.12  ICD-9-CM: 787.01  8/3/2017 - Present Unknown        Type 2 diabetes mellitus with ophthalmic complication, without long-term current use of insulin ([facility] ICD-10-CM: E11.39  ICD-9-CM: 250.50  8/1/2017 - Present Unknown        Acute renal failure with tubular necrosis (HCC) ICD-10-CM: N17.0  ICD-9-CM: 584.5  8/1/2017 - Present Unknown        DKA (diabetic ketoacidoses) ([facility] ICD-10-CM: E13.10  ICD-9-CM: 250.10  7/31/2017 - Present Unknown              * Hospital Course: * Middle age male diabetic admitted through the ER with nausea and vomiting and malaise x 1 day,found to be in DKA with ARF in the ER Insulin Drip and Fluid reuscitation were begun  Per protocol and he improved rapidly. He was seen by Dr Kiana Mcnamara for [facility] see notes. He was transitioned to sq insulin and seen by DTC. He was in good condition at discharge on 8/3/17    * Procedures:   * No surgery found *      Consults: Endocrine    Significant Diagnostic Studies: labs: anion gap,dka,arf    Discharge Exam:  Visit Vitals    /89 (BP 1 Location: Left arm, BP Patient Position: At rest)    Pulse 95    Temp 98.2 °F (36.8 °C)    Resp 18    Wt 242 lb 8.1 oz (110 kg)    SpO2 99%    BMI 33.82 kg/m2     General appearance: alert, fatigued, cooperative, no distress, appears stated age  Lungs: clear to auscultation bilaterally  Heart: regular rate and rhythm, S1, S2 normal, no murmur, click, rub or gallop  Abdomen: soft, non-tender. Bowel sounds normal. No masses,  no organomegaly    * Discharge Condition: good  * Disposition: Home    Discharge Medications:  Current Discharge Medication List      CONTINUE these medications which have CHANGED    Details   insulin NPH/insulin regular (NOVOLIN 70/30, HUMULIN 70/30) 100 unit/mL (70-30) injection 14 units q am,40 units q pm  Qty: 20 mL, Refills: 11         CONTINUE these medications which have NOT CHANGED    Details   glimepiride (AMARYL) 4 mg tablet Take 1 Tab by mouth every morning. Qty: 30 Tab, Refills: 11    Associated Diagnoses: Type 2 diabetes mellitus with mild nonproliferative retinopathy without macular edema, with long-term current use of insulin, unspecified laterality (HCC)      metFORMIN ER (GLUCOPHAGE XR) 750 mg tablet Take 1 Tab by mouth daily. Qty: 60 Tab, Refills: 11    Associated Diagnoses: Type 2 diabetes mellitus with mild nonproliferative retinopathy without macular edema, with long-term current use of insulin, unspecified laterality (HCC)      vardenafil (LEVITRA) 20 mg tablet Take 20 mg by mouth as needed. Qty: 3 Tab, Refills: 5    Associated Diagnoses: Erectile dysfunction, unspecified erectile dysfunction type      !! Blood-Glucose Meter monitoring kit As directed  Qty: 1 Kit, Refills: 0    Associated Diagnoses: Type II or unspecified type diabetes mellitus with neurological manifestations, not stated as uncontrolled      !! Blood-Glucose Meter monitoring kit 4 TIMESDAILY  Qty: 1 Kit, Refills: 0    Associated Diagnoses: IDDM (insulin dependent diabetes mellitus) ([facility])      glucose blood VI test strips (ACCU-CHEK ADVANTAGE TEST) strip by Does Not Apply route four (4) times daily. 4 TIMES DAILY    Qty: 100 Strip, Refills: 11    Associated Diagnoses: IDDM (insulin dependent diabetes mellitus) ([facility]       ! ! - Potential duplicate medications found. Please discuss with provider.       STOP taking these medications       terbinafine HCl (LAMISIL) 250 mg tablet Comments:   Reason for Stopping:         pioglitazone (ACTOS) 45 mg tablet Comments:   Reason for Stopping:               * Follow-up Care/Patient Instructions:   Activity: Activity as tolerated  Diet: Regular Diet  Wound Care: None needed    Follow-up Information     Follow up With Details Comments Contact Info    Nii Dawkins MD   [address] [facility]  [phone]            Signed:  Nii Dawkins MD  8/3/2017  6:45 AM

## 2017-08-03 NOTE — PROGRESS NOTES
Erika Le from [facility]  called to make [employer] appt for patient on Monday per Dr. Dieudonne Nelson request - this was done per Panda Pitt at [facility] Monday 8/7.

## 2017-08-03 NOTE — DISCHARGE INSTRUCTIONS
PATIENT DISCHARGE INSTRUCTIONS      PATIENT DISCHARGE INSTRUCTIONS    Vidhya Ramos / [id number] : 1970    Admitted 2017 Discharged: 8/3/2017       · It is important that you take the medication exactly as they are prescribed. · Keep your medication in the bottles provided by the pharmacist and keep a list of the medication names, dosages, and times to be taken in your wallet. · Do not take other medications without consulting your doctor.      What to do at Home    Recommended Diet: Diabetic Diet    Recommended Activity: Activity as tolerated    If you experience any of the following symptoms Nausea,Vomiting,Fequent Urination, please follow up with Taty Mccoy MD            Signed By: Taty Mccoy MD     August 3, 2017

## 2017-08-04 ENCOUNTER — PATIENT OUTREACH (OUTPATIENT)
Dept: FAMILY MEDICINE CLINIC | Age: 47
End: 2017-08-04

## 2017-08-04 LAB — GAD65 AB SER-ACNC: <5 U/ML (ref 0–5)

## 2017-08-04 NOTE — PROGRESS NOTES
Nurse Navigator Note- attempting to reach patient for 7/31-8/3 ED [facility] admission for DKA. LMTCB x2 listed phone numbers.

## 2017-08-05 LAB
BACTERIA SPEC CULT: NORMAL
SERVICE CMNT-IMP: NORMAL

## 2017-08-07 ENCOUNTER — OFFICE VISIT (OUTPATIENT)
Dept: FAMILY MEDICINE CLINIC | Age: 47
End: 2017-08-07

## 2017-08-07 VITALS
SYSTOLIC BLOOD PRESSURE: 132 MMHG | RESPIRATION RATE: 22 BRPM | BODY MASS INDEX: 29.4 KG/M2 | OXYGEN SATURATION: 97 % | HEIGHT: 71 IN | TEMPERATURE: 98 F | WEIGHT: 210 LBS | DIASTOLIC BLOOD PRESSURE: 86 MMHG | HEART RATE: 92 BPM

## 2017-08-07 DIAGNOSIS — Z79.4 TYPE 2 DIABETES MELLITUS WITH BOTH EYES AFFECTED BY MILD NONPROLIFERATIVE RETINOPATHY WITHOUT MACULAR EDEMA, WITH LONG-TERM CURRENT USE OF INSULIN (HCC): ICD-10-CM

## 2017-08-07 DIAGNOSIS — E11.3293 TYPE 2 DIABETES MELLITUS WITH BOTH EYES AFFECTED BY MILD NONPROLIFERATIVE RETINOPATHY WITHOUT MACULAR EDEMA, WITH LONG-TERM CURRENT USE OF INSULIN (HCC): ICD-10-CM

## 2017-08-07 DIAGNOSIS — F32.A DEPRESSION, UNSPECIFIED DEPRESSION TYPE: ICD-10-CM

## 2017-08-07 DIAGNOSIS — N17.0 ACUTE RENAL FAILURE WITH TUBULAR NECROSIS (HCC): ICD-10-CM

## 2017-08-07 DIAGNOSIS — E11.10 DIABETIC KETOACIDOSIS WITHOUT COMA ASSOCIATED WITH TYPE 2 DIABETES MELLITUS (HCC): Primary | ICD-10-CM

## 2017-08-07 PROBLEM — E11.39 TYPE 2 DIABETES MELLITUS WITH OPHTHALMIC COMPLICATION, WITHOUT LONG-TERM CURRENT USE OF INSULIN (HCC): Status: RESOLVED | Noted: 2017-08-01 | Resolved: 2017-08-07

## 2017-08-07 LAB
GLUCOSE POC: 245 MG/DL
INSULIN AB SER-ACNC: 8.6 UU/ML

## 2017-08-07 RX ORDER — CITALOPRAM 10 MG/1
10 TABLET ORAL DAILY
Qty: 305 TAB | Refills: 5 | Status: SHIPPED | OUTPATIENT
Start: 2017-08-07 | End: 2017-08-30

## 2017-08-07 NOTE — PROGRESS NOTES
Chief Complaint   Patient presents with    Diabetes     F/U on diabetes.  Cholesterol Problem     F/U on cholesterol.

## 2017-08-07 NOTE — PROGRESS NOTES
HISTORY OF PRESENT ILLNESS  Jenni Wagner is a 51 y.o. male. Transitional Care visit following hospitalization for DKA,ARF discharged 8/2/17,contaced by NN 8/3/17. Feeling ok ,fatigued ,appetite improving. Diabetes   The history is provided by the patient. This is a chronic problem. The problem occurs daily. The problem has been gradually improving. The symptoms are aggravated by swallowing. Nausea    The history is provided by the patient. This is a new problem. The current episode started more than 1 week ago. The problem occurs 2 to 4 times per day. The problem has been gradually improving. The emesis has an appearance of stomach contents. Pertinent negatives include no fever and no myalgias. Review of Systems   Constitutional: Positive for malaise/fatigue and weight loss. Negative for fever. Gastrointestinal: Positive for nausea. Genitourinary: Negative for dysuria, frequency and urgency. Musculoskeletal: Negative for myalgias. Physical Exam   Constitutional: He appears well-developed and well-nourished. HENT:   Head: Normocephalic and atraumatic. Right Ear: External ear normal.   Left Ear: External ear normal.   Nose: Nose normal.   Mouth/Throat: Oropharynx is clear and moist.   Cardiovascular: Normal rate and regular rhythm. Pulmonary/Chest: Effort normal.   Abdominal: Soft. Bowel sounds are normal.   Skin: Skin is warm and dry. ASSESSMENT and PLAN  Diagnoses and all orders for this visit:    1. Diabetic ketoacidosis without coma associated with type 2 diabetes mellitus (HCC),resolved  -     AMB POC GLUCOSE, QUANTITATIVE, BLOOD  -     METABOLIC PANEL, COMPREHENSIVE    2. Acute renal failure with tubular necrosis (HCC) resolving    3.  Type 2 diabetes mellitus with both eyes affected by mild nonproliferative retinopathy without macular edema, with long-term current use of insulin (HCC),improving control  -     AMB POC GLUCOSE, QUANTITATIVE, BLOOD  -     glucose blood VI test strips (BLOOD GLUCOSE TEST) strip; by Does Not Apply route Before breakfast, lunch, dinner and at bedtime. 4. Depression, unspecified depression type,mild   -     citalopram (CELEXA) 10 mg tablet; Take 1 Tab by mouth daily. Follow-up Disposition:  Return in about 1 week (around 8/14/2017).

## 2017-08-07 NOTE — MR AVS SNAPSHOT
Visit Information Date & Time Provider Department Dept. Phone Encounter #  
 8/7/2017  4:15 PM [facility] [phone] [id number] Follow-up Instructions Return in about 1 week (around 8/14/2017). Your Appointments 8/30/2017  9:30 AM  
Follow Up with Dirk Waters MD  
[facility] [address] [place] [address]) One [facility] [address] [address] [address] [facility]  
  
    
  
 8/9/2017 11:15 AM  
TRANSITIONAL CARE MANAGEMENT with Gavin Soler MD  
[facility] [address] [place] [address]) Appt Note: Hospital follow-up [address] [facility] [address] [phone] [address] [facility] [address] Upcoming Health Maintenance Date Due Pneumococcal 19-64 Highest Risk (1 of 3 - PCV13) 10/3/1988 EYE EXAM RETINAL OR DILATED Q1 11/22/2014 HEMOGLOBIN A1C Q6M 1/31/2018 FOOT EXAM Q1 6/7/2018 MICROALBUMIN Q1 6/7/2018 LIPID PANEL Q1 8/1/2018 DTaP/Tdap/Td series (2 - Td) 6/7/2027 Allergies as of 8/7/2017  Review Complete On: 8/7/2017 By: Riri Campo No Known Allergies Current Immunizations  Never Reviewed No immunizations on file. Not reviewed this visit You Were Diagnosed With   
  
 Codes Comments Diabetic ketoacidosis without coma associated with type 2 diabetes mellitus ([facility]    -  Primary ICD-10-CM: E13.10 ICD-9-CM: 250.12 Acute renal failure with tubular necrosis (HCC)     ICD-10-CM: N17.0 ICD-9-CM: 952. 5 Type 2 diabetes mellitus with both eyes affected by mild nonproliferative retinopathy without macular edema, with long-term current use of insulin ([facility]     ICD-10-CM: K25.6800, Z79.4 ICD-9-CM: 250.50, 362.04, V58.67 Depression, unspecified depression type     ICD-10-CM: F32.9 ICD-9-CM: 533 Vitals BP Pulse Temp Resp Height(growth percentile) Weight(growth percentile) 132/86 (BP 1 Location: Left arm, BP Patient Position: Sitting) 92 98 °F (36.7 °C) (Oral) 22 5' 11\" (1.803 m) 210 lb (95.3 kg) SpO2 BMI Smoking Status 97% 29.29 kg/m2 Former Smoker Vitals History BMI and BSA Data Body Mass Index Body Surface Area  
 29.29 kg/m 2 2.18 m 2 Preferred Pharmacy Pharmacy Name Phone [id number] [address] [phone] Your Updated Medication List  
  
   
This list is accurate as of: 8/7/17  4:47 PM.  Always use your most recent med list.  
  
  
  
  
 * Blood-Glucose Meter monitoring kit  
4 TIMESDAILY * Blood-Glucose Meter monitoring kit As directed  
  
 citalopram 10 mg tablet Commonly known as:  Markie Ligas Take 1 Tab by mouth daily. glimepiride 4 mg tablet Commonly known as:  AMARYL Take 1 Tab by mouth every morning. * glucose blood VI test strips strip Commonly known as:  ACCU-CHEK ADVANTAGE TEST  
by Does Not Apply route four (4) times daily. 4 TIMES DAILY * glucose blood VI test strips strip Commonly known as:  blood glucose test  
by Does Not Apply route Before breakfast, lunch, dinner and at bedtime. insulin NPH/insulin regular 100 unit/mL (70-30) injection Commonly known as:  NOVOLIN 70/30, HUMULIN 70/30  
14 units q am,40 units q pm  
  
 metFORMIN  mg tablet Commonly known as:  GLUCOPHAGE XR Take 1 Tab by mouth daily. vardenafil 20 mg tablet Commonly known as:  LEVITRA Take 20 mg by mouth as needed. * Notice: This list has 4 medication(s) that are the same as other medications prescribed for you. Read the directions carefully, and ask your doctor or other care provider to review them with you. Prescriptions Printed Refills  
 glucose blood VI test strips (BLOOD GLUCOSE TEST) strip 3 Sig: by Does Not Apply route Before breakfast, lunch, dinner and at bedtime. Class: Print Route: Does Not Apply Prescriptions Sent to Pharmacy Refills  
 citalopram (CELEXA) 10 mg tablet 5 Sig: Take 1 Tab by mouth daily. Class: Normal  
 Pharmacy: [facility] Dalia Almazan, [address] [place] [address] #: [phone] Route: Oral  
  
We Performed the Following AMB POC GLUCOSE, QUANTITATIVE, BLOOD [56053 CPT(R)] METABOLIC PANEL, COMPREHENSIVE [03374 CPT(R)] Follow-up Instructions Return in about 1 week (around 8/14/2017). Introducing [facility] & HEALTH SERVICES! Isabela Gonzales introduces [employer] patient portal. Now you can access parts of your medical record, email your doctor's office, and request medication refills online. 1. In your internet browser, go to [email or web address] 2. Click on the First Time User? Click Here link in the Sign In box. You will see the New Member Sign Up page. 3. Enter your [employer] Access Code exactly as it appears below. You will not need to use this code after youve completed the sign-up process. If you do not sign up before the expiration date, you must request a new code. · [employer] Access Code: [email or web address] Expires: 9/5/2017  4:25 PM 
 
4. Enter the last four digits of your Social Security Number (xxxx) and Date of Birth (mm/dd/yyyy) as indicated and click Submit. You will be taken to the next sign-up page. 5. Create a [employer] ID. This will be your [employer] login ID and cannot be changed, so think of one that is secure and easy to remember. 6. Create a [employer] password. You can change your password at any time. 7. Enter your Password Reset Question and Answer. This can be used at a later time if you forget your password. 8. Enter your e-mail address. You will receive e-mail notification when new information is available in [address] 9. Click Sign Up. You can now view and download portions of your medical record. 10. Click the Download Summary menu link to download a portable copy of your medical information. If you have questions, please visit the Frequently Asked Questions section of the [employer] website. Remember, [employer] is NOT to be used for urgent needs. For medical emergencies, dial 911. Now available from your iPhone and Android! Please provide this summary of care documentation to your next provider. Your primary care clinician is listed as Leti Cheney. If you have any questions after today's visit, please call [phone].

## 2017-08-08 LAB
ALBUMIN SERPL-MCNC: 4.5 G/DL (ref 3.5–5.5)
ALBUMIN/GLOB SERPL: 1.8 {RATIO} (ref 1.2–2.2)
ALP SERPL-CCNC: 120 IU/L (ref 39–117)
ALT SERPL-CCNC: 58 IU/L (ref 0–44)
AST SERPL-CCNC: 57 IU/L (ref 0–40)
BILIRUB SERPL-MCNC: 0.4 MG/DL (ref 0–1.2)
BUN SERPL-MCNC: 23 MG/DL (ref 6–24)
BUN/CREAT SERPL: 20 (ref 9–20)
CALCIUM SERPL-MCNC: 9.7 MG/DL (ref 8.7–10.2)
CHLORIDE SERPL-SCNC: 96 MMOL/L (ref 96–106)
CO2 SERPL-SCNC: 25 MMOL/L (ref 18–29)
CREAT SERPL-MCNC: 1.16 MG/DL (ref 0.76–1.27)
GLOBULIN SER CALC-MCNC: 2.5 G/DL (ref 1.5–4.5)
GLUCOSE SERPL-MCNC: 264 MG/DL (ref 65–99)
POTASSIUM SERPL-SCNC: 5.2 MMOL/L (ref 3.5–5.2)
PROT SERPL-MCNC: 7 G/DL (ref 6–8.5)
SODIUM SERPL-SCNC: 136 MMOL/L (ref 134–144)

## 2017-08-14 ENCOUNTER — OFFICE VISIT (OUTPATIENT)
Dept: FAMILY MEDICINE CLINIC | Age: 47
End: 2017-08-14

## 2017-08-14 VITALS
WEIGHT: 210.5 LBS | TEMPERATURE: 98 F | HEIGHT: 71 IN | OXYGEN SATURATION: 93 % | RESPIRATION RATE: 26 BRPM | SYSTOLIC BLOOD PRESSURE: 122 MMHG | DIASTOLIC BLOOD PRESSURE: 84 MMHG | HEART RATE: 98 BPM | BODY MASS INDEX: 29.47 KG/M2

## 2017-08-14 DIAGNOSIS — Z79.4 TYPE 2 DIABETES MELLITUS WITH BOTH EYES AFFECTED BY MILD NONPROLIFERATIVE RETINOPATHY WITHOUT MACULAR EDEMA, WITH LONG-TERM CURRENT USE OF INSULIN (HCC): Primary | ICD-10-CM

## 2017-08-14 DIAGNOSIS — E11.3293 TYPE 2 DIABETES MELLITUS WITH BOTH EYES AFFECTED BY MILD NONPROLIFERATIVE RETINOPATHY WITHOUT MACULAR EDEMA, WITH LONG-TERM CURRENT USE OF INSULIN (HCC): Primary | ICD-10-CM

## 2017-08-14 DIAGNOSIS — E11.10 DIABETIC KETOACIDOSIS WITHOUT COMA ASSOCIATED WITH TYPE 2 DIABETES MELLITUS (HCC): ICD-10-CM

## 2017-08-14 LAB — GLUCOSE POC: 236 MG/DL

## 2017-08-21 ENCOUNTER — OFFICE VISIT (OUTPATIENT)
Dept: FAMILY MEDICINE CLINIC | Age: 47
End: 2017-08-21

## 2017-08-21 VITALS
RESPIRATION RATE: 22 BRPM | BODY MASS INDEX: 30.46 KG/M2 | TEMPERATURE: 97.9 F | WEIGHT: 218.4 LBS | HEART RATE: 98 BPM | DIASTOLIC BLOOD PRESSURE: 86 MMHG | SYSTOLIC BLOOD PRESSURE: 129 MMHG | OXYGEN SATURATION: 97 %

## 2017-08-21 DIAGNOSIS — E11.3293 TYPE 2 DIABETES MELLITUS WITH BOTH EYES AFFECTED BY MILD NONPROLIFERATIVE RETINOPATHY WITHOUT MACULAR EDEMA, WITH LONG-TERM CURRENT USE OF INSULIN (HCC): Primary | ICD-10-CM

## 2017-08-21 DIAGNOSIS — R53.82 CHRONIC FATIGUE: ICD-10-CM

## 2017-08-21 DIAGNOSIS — Z79.4 TYPE 2 DIABETES MELLITUS WITH BOTH EYES AFFECTED BY MILD NONPROLIFERATIVE RETINOPATHY WITHOUT MACULAR EDEMA, WITH LONG-TERM CURRENT USE OF INSULIN (HCC): Primary | ICD-10-CM

## 2017-08-21 NOTE — LETTER
NOTIFICATION OF RETURN TO WORK / SCHOOL 
 
8/21/2017 4:47 PM 
 
Mr. Vidhya Ramos 
[address] [place] [facility] [address] [facility] [place] To Whom It May Concern: 
 
Vidhya Ramos was under the care of [facility] from 7/31/17 He will be able to return to work/school on 8/24/17 with no restrictions. If there are questions or concerns please have the patient contact our office. Sincerely, Taty Mccoy MD

## 2017-08-21 NOTE — PROGRESS NOTES
HISTORY OF PRESENT ILLNESS  Amrita Cruz is a 51 y.o. male. Feeling ok ,fatigued ,appetite improving.,some nausea and diarrhea,worried that he may ot have endurance for work,taking 70/30 insulin 20 units ,40 u    Diabetes   The history is provided by the patient. This is a chronic problem. The problem occurs daily. The problem has been gradually improving. The symptoms are aggravated by swallowing. sugars are erratic   Nausea    The history is provided by the patient. This is a new problem. The current episode started more than 1 week ago. The problem occurs 2 to 4 times per day. The problem has been gradually improving. The emesis has an appearance of stomach contents. Pertinent negatives include no fever and no myalgias. Review of Systems   Constitutional: Positive for malaise/fatigue and weight loss. Negative for fever. Gastrointestinal: Positive for nausea. Genitourinary: Negative for dysuria, frequency and urgency. Musculoskeletal: Negative for myalgias. Physical Exam   Constitutional: He appears well-developed and well-nourished. HENT:   Head: Normocephalic and atraumatic. Right Ear: External ear normal.   Left Ear: External ear normal.   Nose: Nose normal.   Mouth/Throat: Oropharynx is clear and moist.   Cardiovascular: Normal rate and regular rhythm. Pulmonary/Chest: Effort normal.   Abdominal: Soft. Bowel sounds are normal.   Skin: Skin is warm and dry. Diagnoses and all orders for this visit:    1. Type 2 diabetes mellitus with both eyes affected by mild nonproliferative retinopathy without macular edema, with long-term current use of insulin ([address]    2. Chronic fatigue    Slowly improving,to see Endo next week. Continue current meds and treatments. Follow-up Disposition:  Return in about 4 weeks (around 9/18/2017). Follow-up Disposition:  Return in about 4 weeks (around 9/18/2017).

## 2017-08-21 NOTE — MR AVS SNAPSHOT
Visit Information Date & Time Provider Department Dept. Phone Encounter #  
 8/21/2017  4:15 PM [facility] [phone] [id number] Follow-up Instructions Return in about 4 weeks (around 9/18/2017). Your Appointments 8/30/2017  9:30 AM  
Follow Up with Franco Goldberg MD  
[facility]) [address] [address] [address] [place] [address] Upcoming Health Maintenance Date Due Pneumococcal 19-64 Highest Risk (1 of 3 - PCV13) 10/3/1988 EYE EXAM RETINAL OR DILATED Q1 11/22/2014 HEMOGLOBIN A1C Q6M 1/31/2018 FOOT EXAM Q1 6/7/2018 MICROALBUMIN Q1 6/7/2018 LIPID PANEL Q1 8/1/2018 DTaP/Tdap/Td series (2 - Td) 6/7/2027 Allergies as of 8/21/2017  Review Complete On: 8/21/2017 By: Yadira Betancur LPN No Known Allergies Current Immunizations  Never Reviewed No immunizations on file. Not reviewed this visit You Were Diagnosed With   
  
 Codes Comments Type 2 diabetes mellitus with both eyes affected by mild nonproliferative retinopathy without macular edema, with long-term current use of insulin ([facility]    -  Primary ICD-10-CM: B14.5067, Z79.4 ICD-9-CM: 250.50, 362.04, V58.67 Chronic fatigue     ICD-10-CM: R53.82 
ICD-9-CM: 780.79 Vitals BP Pulse Temp Resp Weight(growth percentile) SpO2  
 129/86 (BP 1 Location: Left arm, BP Patient Position: Sitting) 98 97.9 °F (36.6 °C) 22 218 lb 6.4 oz (99.1 kg) 97% BMI Smoking Status 30.46 kg/m2 Former Smoker BMI and BSA Data Body Mass Index Body Surface Area  
 30.46 kg/m 2 2.23 m 2 Preferred Pharmacy Pharmacy Name Phone [id number] [address] [phone] Your Updated Medication List  
  
   
 This list is accurate as of: 8/21/17  4:47 PM.  Always use your most recent med list.  
  
  
  
  
 * Blood-Glucose Meter monitoring kit  
4 TIMESDAILY * Blood-Glucose Meter monitoring kit As directed  
  
 citalopram 10 mg tablet Commonly known as:  Darrius Pac Take 1 Tab by mouth daily. glimepiride 4 mg tablet Commonly known as:  AMARYL Take 1 Tab by mouth every morning. * glucose blood VI test strips strip Commonly known as:  ACCU-CHEK ADVANTAGE TEST  
by Does Not Apply route four (4) times daily. 4 TIMES DAILY * glucose blood VI test strips strip Commonly known as:  blood glucose test  
by Does Not Apply route Before breakfast, lunch, dinner and at bedtime. insulin NPH/insulin regular 100 unit/mL (70-30) injection Commonly known as:  NOVOLIN 70/30, HUMULIN 70/30  
20 units q am,40 units q pm  
  
 metFORMIN  mg tablet Commonly known as:  GLUCOPHAGE XR Take 1 Tab by mouth daily. vardenafil 20 mg tablet Commonly known as:  LEVITRA Take 20 mg by mouth as needed. * Notice: This list has 4 medication(s) that are the same as other medications prescribed for you. Read the directions carefully, and ask your doctor or other care provider to review them with you. Follow-up Instructions Return in about 4 weeks (around 9/18/2017). Introducing [facility]! Rajani Cortez introduces [employer] patient portal. Now you can access parts of your medical record, email your doctor's office, and request medication refills online. 1. In your internet browser, go to [email or web address] 2. Click on the First Time User? Click Here link in the Sign In box. You will see the New Member Sign Up page. 3. Enter your [employer] Access Code exactly as it appears below. You will not need to use this code after youve completed the sign-up process. If you do not sign up before the expiration date, you must request a new code. · [employer] Access Code: [email or web address] Expires: 9/5/2017  4:25 PM 
 
4. Enter the last four digits of your Social Security Number (xxxx) and Date of Birth (mm/dd/yyyy) as indicated and click Submit. You will be taken to the next sign-up page. 5. Create a [employer] ID. This will be your [employer] login ID and cannot be changed, so think of one that is secure and easy to remember. 6. Create a [employer] password. You can change your password at any time. 7. Enter your Password Reset Question and Answer. This can be used at a later time if you forget your password. 8. Enter your e-mail address. You will receive e-mail notification when new information is available in [address] 9. Click Sign Up. You can now view and download portions of your medical record. 10. Click the Download Summary menu link to download a portable copy of your medical information. If you have questions, please visit the Frequently Asked Questions section of the [employer] website. Remember, [employer] is NOT to be used for urgent needs. For medical emergencies, dial 911. Now available from your iPhone and Android! Please provide this summary of care documentation to your next provider. Your primary care clinician is listed as Montserrat Jauregui. If you have any questions after today's visit, please call [phone].

## 2017-08-30 ENCOUNTER — OFFICE VISIT (OUTPATIENT)
Dept: ENDOCRINOLOGY | Age: 47
End: 2017-08-30

## 2017-08-30 VITALS
DIASTOLIC BLOOD PRESSURE: 88 MMHG | HEART RATE: 86 BPM | BODY MASS INDEX: 30.55 KG/M2 | SYSTOLIC BLOOD PRESSURE: 133 MMHG | WEIGHT: 218.2 LBS | HEIGHT: 71 IN

## 2017-08-30 DIAGNOSIS — Z79.4 TYPE 2 DIABETES MELLITUS WITH BOTH EYES AFFECTED BY MILD NONPROLIFERATIVE RETINOPATHY WITHOUT MACULAR EDEMA, WITH LONG-TERM CURRENT USE OF INSULIN (HCC): Primary | ICD-10-CM

## 2017-08-30 DIAGNOSIS — E11.3293 TYPE 2 DIABETES MELLITUS WITH BOTH EYES AFFECTED BY MILD NONPROLIFERATIVE RETINOPATHY WITHOUT MACULAR EDEMA, WITH LONG-TERM CURRENT USE OF INSULIN (HCC): Primary | ICD-10-CM

## 2017-08-30 NOTE — PATIENT INSTRUCTIONS
1) Stop the Metformin and Stop the Glimepride  2) Novolin 70/30 take 30 units in the morning and 30 units with dinner. 3) Check your sugars and mail your sugar logs to me in 3 weeks.

## 2017-08-30 NOTE — PROGRESS NOTES
Chief Complaint   Patient presents with    Diabetes     pcp and pharmacy verified. Last eye exam 2 years ago     History of Present Illness: Ivy Kaiser is a 51 y.o. male here for follow up of diabetes. I first saw . Lelo Lezama during his recent hospitalization for DKA. His ADARSH was negative, but his insulin Ab was positive at 8.6, which fits with type I DM. Pt was sent home on Humalog 75:25, 20 units with breakfast and 40 units with dinner. Pt notes he has been having some fluctuations between waking and his first meal.   He will wake with BGs as low at the 50's and after breakfast and it can be in the 300's two hours later. Pt is taking the Metformin 750mg daily, glimepiride 4mg daily and Novolin 70/30 20 in the AM and 40 units in the evening. Pt is eating 3 meals and two snacks. Pt is waking around 5-6AM.  He has breakfast before he leaves for work around [address] he had a bowl of cereal and the milk. He will have a mid-morning snack of an apple or yogurt (low sugar)  He has lunch around 1230PM, yesterday he had a hamburger on a bun with cheese, no side items and ice water with lemon  He will have a mid-afternoon snack of yogurt or cheese and fruit. He has dinner between 7-9PM, last night he had vegetable and chicken chowder and a biscuit and iced tea (splenda)  He will have an evening snack to prevent low BGs overnight. He has been eating NSA ice cream (4 grams of carbs) or a bowl of cereal.    Exercise consists of walking at work, but no extra excercise. No history of vascular disease. No history of neuropathy, but + hx of nephropathy. Last eye exam was 2015, pt has hx of retinopathy.         Current Outpatient Prescriptions   Medication Sig    insulin NPH/insulin regular (NOVOLIN 70/30, HUMULIN 70/30) 100 unit/mL (70-30) injection 20 units q am,40 units q pm (Patient taking differently: 20 units q am,40 units q pm (uses sliding scale))    glucose blood VI test strips (BLOOD GLUCOSE TEST) strip by Does Not Apply route Before breakfast, lunch, dinner and at bedtime.  glimepiride (AMARYL) 4 mg tablet Take 1 Tab by mouth every morning.  metFORMIN ER (GLUCOPHAGE XR) 750 mg tablet Take 1 Tab by mouth daily.  vardenafil (LEVITRA) 20 mg tablet Take 20 mg by mouth as needed.  Blood-Glucose Meter monitoring kit As directed    Blood-Glucose Meter monitoring kit 4 TIMESDAILY    glucose blood VI test strips (ACCU-CHEK ADVANTAGE TEST) strip by Does Not Apply route four (4) times daily. 4 TIMES DAILY   (Patient taking differently: by Does Not Apply route four (4) times daily. [address] [place] [address])     No current facility-administered medications for this visit. No Known Allergies  Review of Systems:  - Eyes: no blurry vision or double vision  - Cardiovascular: no chest pain  - Respiratory: no shortness of breath  - Musculoskeletal: no myalgias  - Neurological: no numbness/tingling in extremities    Physical Examination:  Blood pressure 133/88, pulse 86, height 5' 11\" (1.803 m), weight 218 lb 3.2 oz (99 kg). - General: pleasant, no distress, good eye contact   - Neck: no carotid bruits  - Cardiovascular: regular, normal rate, nl s1 and s2, no m/r/g, 2+ DP pulses   - Respiratory: clear bilaterally  - Integumentary: no edema, no foot ulcers, sensation to monofilament and vibration intact bilaterally  - Psychiatric: normal mood and affect    Data Reviewed:   - none new for review    Assessment/Plan:   1) DM > He had + anti-insulin Ab, which make me think he could be a Type I DM. Will check c-peptide, insulin and proinsulin level. Pt to stop the Glimepride and Metformin for now and will have him change his Novolin 70:30 to 30 units in the AM and 30 units with dinner. Pt to check his BGs 4 times daily and mail his BG logs to me in 3 weeks. Because of the low BGs he needs to check his BGs more frequently. His BP was at goal today on no BP meds.   His cholesterol in August 2017 was at goal on no anti-lipid agents. Pt voices understanding and agreement with the plan. RTC 3 months    Patient Instructions   1) Stop the Metformin and Stop the Glimepride  2) Novolin 70/30 take 30 units in the morning and 30 units with dinner. 3) Check your sugars and mail your sugar logs to me in 3 weeks. Follow-up Disposition:  Return in about 3 months (around 11/30/2017).     Copy sent to:  Dr. Vania Issa

## 2017-08-30 NOTE — MR AVS SNAPSHOT
Visit Information Date & Time Provider Department Dept. Phone Encounter #  
 8/30/2017  9:30 AM Selena Infante MD [facility] [phone] Your Appointments 9/22/2017 12:15 PM  
ROUTINE CARE with Key Pete MD  
[facility] [address]) Appt Note: blood work [address] [place] [phone]  
[phone] [facility] [address] Upcoming Health Maintenance Date Due Pneumococcal 19-64 Highest Risk (1 of 3 - PCV13) 10/3/1988 EYE EXAM RETINAL OR DILATED Q1 11/22/2014 HEMOGLOBIN A1C Q6M 1/31/2018 FOOT EXAM Q1 6/7/2018 MICROALBUMIN Q1 6/7/2018 LIPID PANEL Q1 8/1/2018 DTaP/Tdap/Td series (2 - Td) 6/7/2027 Allergies as of 8/30/2017  Review Complete On: 8/30/2017 By: Cari Negron LPN No Known Allergies Current Immunizations  Never Reviewed No immunizations on file. Not reviewed this visit You Were Diagnosed With   
  
 Codes Comments Type 2 diabetes mellitus with both eyes affected by mild nonproliferative retinopathy without macular edema, with long-term current use of insulin ([facility]    -  Primary ICD-10-CM: P36.9733, Z79.4 ICD-9-CM: 250.50, 362.04, V58.67 Vitals BP Pulse Height(growth percentile) Weight(growth percentile) BMI Smoking Status 133/88 86 5' 11\" (1.803 m) 218 lb 3.2 oz (99 kg) 30.43 kg/m2 Former Smoker Vitals History BMI and BSA Data Body Mass Index Body Surface Area  
 30.43 kg/m 2 2.23 m 2 Preferred Pharmacy Pharmacy Name Phone [facility] [address] [place] [address] Dr Maguire, [address] [place] [address] Avenue [phone] Your Updated Medication List  
  
   
This list is accurate as of: 8/30/17 10:19 AM.  Always use your most recent med list.  
  
  
  
  
 * Blood-Glucose Meter monitoring kit  
4 TIMESDAILY * Blood-Glucose Meter monitoring kit As directed  
  
 glimepiride 4 mg tablet Commonly known as:  AMARYL Take 1 Tab by mouth every morning. * glucose blood VI test strips strip Commonly known as:  ACCU-CHEK ADVANTAGE TEST  
by Does Not Apply route four (4) times daily. 4 TIMES DAILY * glucose blood VI test strips strip Commonly known as:  blood glucose test  
by Does Not Apply route Before breakfast, lunch, dinner and at bedtime. insulin NPH/insulin regular 100 unit/mL (70-30) injection Commonly known as:  NOVOLIN 70/30, HUMULIN 70/30  
20 units q am,40 units q pm  
  
 metFORMIN  mg tablet Commonly known as:  GLUCOPHAGE XR Take 1 Tab by mouth daily. vardenafil 20 mg tablet Commonly known as:  LEVITRA Take 20 mg by mouth as needed. * Notice: This list has 4 medication(s) that are the same as other medications prescribed for you. Read the directions carefully, and ask your doctor or other care provider to review them with you. We Performed the Following C-PEPTIDE [id number] CPT(R)] INSULIN [id number] CPT(R)] PROINSULIN [id number] CPT(R)] Patient Instructions 1) Stop the Metformin and Stop the Glimepride 2) Novolin 70/30 take 30 units in the morning and 30 units with dinner. 3) Check your sugars and mail your sugar logs to me in 3 weeks. Introducing [facility] HE[facility] SERVICES! [facility] introduces [employer] patient portal. Now you can access parts of your medical record, email your doctor's office, and request medication refills online. 1. In your internet browser, go to [email or web address] 2. Click on the First Time User? Click Here link in the Sign In box. You will see the New Member Sign Up page. 3. Enter your [employer] Access Code exactly as it appears below. You will not need to use this code after youve completed the sign-up process. If you do not sign up before the expiration date, you must request a new code. · Productiv Access Code: [email or web address] Expires: 9/5/2017  4:25 PM 
 
4. Enter the last four digits of your Social Security Number (xxxx) and Date of Birth (mm/dd/yyyy) as indicated and click Submit. You will be taken to the next sign-up page. 5. Create a Productiv ID. This will be your Productiv login ID and cannot be changed, so think of one that is secure and easy to remember. 6. Create a Productiv password. You can change your password at any time. 7. Enter your Password Reset Question and Answer. This can be used at a later time if you forget your password. 8. Enter your e-mail address. You will receive e-mail notification when new information is available in [address] 9. Click Sign Up. You can now view and download portions of your medical record. 10. Click the Download Summary menu link to download a portable copy of your medical information. If you have questions, please visit the Frequently Asked Questions section of the Productiv website. Remember, [employer] is NOT to be used for urgent needs. For medical emergencies, dial 911. Now available from your iPhone and Android! Please provide this summary of care documentation to your next provider. Your primary care clinician is listed as Valorie Sharma. If you have any questions after today's visit, please call [phone].

## 2017-09-22 ENCOUNTER — OFFICE VISIT (OUTPATIENT)
Dept: FAMILY MEDICINE CLINIC | Age: 47
End: 2017-09-22

## 2017-09-22 VITALS
SYSTOLIC BLOOD PRESSURE: 126 MMHG | OXYGEN SATURATION: 98 % | DIASTOLIC BLOOD PRESSURE: 86 MMHG | WEIGHT: 223 LBS | RESPIRATION RATE: 24 BRPM | HEART RATE: 80 BPM | TEMPERATURE: 98.1 F | HEIGHT: 71 IN | BODY MASS INDEX: 31.22 KG/M2

## 2017-09-22 DIAGNOSIS — E11.3293 TYPE 2 DIABETES MELLITUS WITH BOTH EYES AFFECTED BY MILD NONPROLIFERATIVE RETINOPATHY WITHOUT MACULAR EDEMA, WITH LONG-TERM CURRENT USE OF INSULIN (HCC): Primary | ICD-10-CM

## 2017-09-22 DIAGNOSIS — N17.0 ACUTE RENAL FAILURE WITH TUBULAR NECROSIS (HCC): ICD-10-CM

## 2017-09-22 DIAGNOSIS — Z79.4 TYPE 2 DIABETES MELLITUS WITH BOTH EYES AFFECTED BY MILD NONPROLIFERATIVE RETINOPATHY WITHOUT MACULAR EDEMA, WITH LONG-TERM CURRENT USE OF INSULIN (HCC): Primary | ICD-10-CM

## 2017-09-22 DIAGNOSIS — K21.00 GASTROESOPHAGEAL REFLUX DISEASE WITH ESOPHAGITIS: Chronic | ICD-10-CM

## 2017-09-22 NOTE — MR AVS SNAPSHOT
Visit Information Date & Time Provider Department Dept. Phone Encounter #  
 9/22/2017 12:15 PM Arabella Foster, [address] [phone] [id number] Follow-up Instructions Return in about 4 weeks (around 10/20/2017). Your Appointments 12/13/2017  8:50 AM  
Follow Up with Ignacio Miranda MD  
[facility] [address] [facility]) Appt Note: f/u             dm          3 mo  
 [address] [facility] [address] [phone] [facility] Upcoming Health Maintenance Date Due Pneumococcal 19-64 Highest Risk (1 of 3 - PCV13) 10/3/1988 EYE EXAM RETINAL OR DILATED Q1 11/22/2014 HEMOGLOBIN A1C Q6M 1/31/2018 FOOT EXAM Q1 6/7/2018 MICROALBUMIN Q1 6/7/2018 LIPID PANEL Q1 8/1/2018 DTaP/Tdap/Td series (2 - Td) 6/7/2027 Allergies as of 9/22/2017  Review Complete On: 8/30/2017 By: Ignacio Miranda MD  
 No Known Allergies Current Immunizations  Never Reviewed No immunizations on file. Not reviewed this visit You Were Diagnosed With   
  
 Codes Comments Type 2 diabetes mellitus with both eyes affected by mild nonproliferative retinopathy without macular edema, with long-term current use of insulin ([facility]    -  Primary ICD-10-CM: R10.8316, Z79.4 ICD-9-CM: 250.50, 362.04, V58.67 Gastroesophageal reflux disease with esophagitis     ICD-10-CM: K21.0 ICD-9-CM: 530.11 Acute renal failure with tubular necrosis (HCC)     ICD-10-CM: N17.0 ICD-9-CM: 978. 5 Vitals BP Pulse Temp Resp Height(growth percentile) Weight(growth percentile) 126/86 (BP 1 Location: Left arm, BP Patient Position: Sitting) 80 98.1 °F (36.7 °C) (Oral) 24 5' 11\" (1.803 m) 223 lb (101.2 kg) SpO2 BMI Smoking Status 98% 31.1 kg/m2 Former Smoker BMI and BSA Data Body Mass Index Body Surface Area 31.1 kg/m 2 2.25 m 2 Preferred Pharmacy Pharmacy Name Phone [facility] [address], [address] [place] [address] [phone] Your Updated Medication List  
  
   
This list is accurate as of: 9/22/17 12:39 PM.  Always use your most recent med list.  
  
  
  
  
 * Blood-Glucose Meter monitoring kit  
4 TIMESDAILY * Blood-Glucose Meter monitoring kit As directed  
  
 glucose blood VI test strips strip Commonly known as:  ACCU-CHEK ADVANTAGE TEST  
by Does Not Apply route four (4) times daily. 4 TIMES DAILY  
  
 insulin NPH/insulin regular 100 unit/mL (70-30) injection Commonly known as:  NOVOLIN 70/30, HUMULIN 70/30  
20 units q am,40 units q pm  
  
 vardenafil 20 mg tablet Commonly known as:  LEVITRA Take 20 mg by mouth as needed. * Notice: This list has 2 medication(s) that are the same as other medications prescribed for you. Read the directions carefully, and ask your doctor or other care provider to review them with you. Follow-up Instructions Return in about 4 weeks (around 10/20/2017). Introducing [facility]! Juan Schmidt introduces [employer] patient portal. Now you can access parts of your medical record, email your doctor's office, and request medication refills online. 1. In your internet browser, go to [email or web address] 2. Click on the First Time User? Click Here link in the Sign In box. You will see the New Member Sign Up page. 3. Enter your byUs Access Code exactly as it appears below. You will not need to use this code after youve completed the sign-up process. If you do not sign up before the expiration date, you must request a new code. · [employer]Us Access Code: [email or web address] Expires: 12/21/2017 12:39 PM 
 
4. Enter the last four digits of your Social Security Number (xxxx) and Date of Birth (mm/dd/yyyy) as indicated and click Submit.  You will be taken to the next sign-up page. 5. Create a EcoBuddiesÃ¢â€žÂ¢ Interactive ID. This will be your [employer]Ã¢â€žÂ¢ Interactive login ID and cannot be changed, so think of one that is secure and easy to remember. 6. Create a [employer]uddiesÃ¢â€žÂ¢ Interactive password. You can change your password at any time. 7. Enter your Password Reset Question and Answer. This can be used at a later time if you forget your password. 8. Enter your e-mail address. You will receive e-mail notification when new information is available in [address] 9. Click Sign Up. You can now view and download portions of your medical record. 10. Click the Download Summary menu link to download a portable copy of your medical information. If you have questions, please visit the Frequently Asked Questions section of the [employer]Ã¢â€žÂ¢ Interactive website. Remember, [employer]Ã¢â€žÂ¢ Interactive is NOT to be used for urgent needs. For medical emergencies, dial 911. Now available from your iPhone and Android! Please provide this summary of care documentation to your next provider. Your primary care clinician is listed as Sita Gottlieb. If you have any questions after today's visit, please call [phone].

## 2017-09-23 NOTE — PROGRESS NOTES
HISTORY OF PRESENT ILLNESS  Tammy Marie is a 51 y.o. male. F/[id number] fairly well on nsulin regimen per Dr Lex Erickson. Feeling well,woried abot wt gain. Energy level is good  Diabetes   The history is provided by the patient. This is a chronic problem. The problem occurs daily. The problem has been gradually improving. Pertinent negatives include no headaches and no shortness of breath. Review of Systems   Constitutional: Negative for fever and malaise/fatigue. Respiratory: Negative for shortness of breath. Gastrointestinal: Negative for constipation. Genitourinary: Negative for frequency. Neurological: Negative for headaches. Physical Exam   Constitutional: He is oriented to person, place, and time. He appears well-developed and well-nourished. HENT:   Head: Normocephalic and atraumatic. Right Ear: External ear normal.   Left Ear: External ear normal.   Nose: Nose normal.   Mouth/Throat: Oropharynx is clear and moist.   Cardiovascular: Normal rate and regular rhythm. Pulmonary/Chest: Effort normal and breath sounds normal.   Abdominal: Soft. Bowel sounds are normal.   Neurological: He is alert and oriented to person, place, and time. Skin: Skin is warm and dry. ASSESSMENT and PLAN  Diagnoses and all orders for this visit:    1. Type 2 diabetes mellitus with both eyes affected by mild nonproliferative retinopathy without macular edema, with long-term current use of insulin ([address]    2. Gastroesophageal reflux disease with esophagitis    3. Acute renal failure with tubular necrosis ([address]      Follow-up Disposition:  Return in about 4 weeks (around 10/20/2017).

## 2017-11-06 ENCOUNTER — OFFICE VISIT (OUTPATIENT)
Dept: FAMILY MEDICINE CLINIC | Age: 47
End: 2017-11-06

## 2017-11-06 VITALS
TEMPERATURE: 98.1 F | HEART RATE: 79 BPM | RESPIRATION RATE: 20 BRPM | OXYGEN SATURATION: 98 % | DIASTOLIC BLOOD PRESSURE: 82 MMHG | HEIGHT: 71 IN | SYSTOLIC BLOOD PRESSURE: 118 MMHG | BODY MASS INDEX: 31.98 KG/M2 | WEIGHT: 228.4 LBS

## 2017-11-06 DIAGNOSIS — E11.3293 TYPE 2 DIABETES MELLITUS WITH BOTH EYES AFFECTED BY MILD NONPROLIFERATIVE RETINOPATHY WITHOUT MACULAR EDEMA, WITH LONG-TERM CURRENT USE OF INSULIN (HCC): Primary | ICD-10-CM

## 2017-11-06 DIAGNOSIS — R53.83 FATIGUE, UNSPECIFIED TYPE: ICD-10-CM

## 2017-11-06 DIAGNOSIS — N52.9 ERECTILE DYSFUNCTION, UNSPECIFIED ERECTILE DYSFUNCTION TYPE: Chronic | ICD-10-CM

## 2017-11-06 DIAGNOSIS — Z79.4 TYPE 2 DIABETES MELLITUS WITH BOTH EYES AFFECTED BY MILD NONPROLIFERATIVE RETINOPATHY WITHOUT MACULAR EDEMA, WITH LONG-TERM CURRENT USE OF INSULIN (HCC): Primary | ICD-10-CM

## 2017-11-06 DIAGNOSIS — N17.0 ACUTE RENAL FAILURE WITH TUBULAR NECROSIS (HCC): ICD-10-CM

## 2017-11-06 LAB
GLUCOSE POC: 125 MG/DL
HBA1C MFR BLD HPLC: 8.6 %

## 2017-11-06 NOTE — MR AVS SNAPSHOT
Visit Information Date & Time Provider Department Dept. Phone Encounter #  
 11/6/2017 10:45 AM John Fernandes [phone] [id number] Follow-up Instructions Return in about 3 months (around 2/6/2018). Your Appointments 12/13/2017  8:50 AM  
Follow Up with MD [facility] [address] [facility]) Appt Note: f/u             dm          3 mo  
 Ariel Lynn [facility] [address] [phone] [address] [place] [address] Upcoming Health Maintenance Date Due Pneumococcal 19-64 Highest Risk (1 of 3 - PCV13) 10/3/1988 EYE EXAM RETINAL OR DILATED Q1 11/22/2014 HEMOGLOBIN A1C Q6M 1/31/2018 FOOT EXAM Q1 6/7/2018 MICROALBUMIN Q1 6/7/2018 LIPID PANEL Q1 8/1/2018 DTaP/Tdap/Td series (2 - Td) 6/7/2027 Allergies as of 11/6/2017  Review Complete On: 11/6/2017 By: Nino Machado No Known Allergies Current Immunizations  Never Reviewed No immunizations on file. Not reviewed this visit You Were Diagnosed With   
  
 Codes Comments Type 2 diabetes mellitus with both eyes affected by mild nonproliferative retinopathy without macular edema, with long-term current use of insulin ([facility]    -  Primary ICD-10-CM: W96.7707, Z79.4 ICD-9-CM: 250.50, 362.04, V58.67 Acute renal failure with tubular necrosis (HCC)     ICD-10-CM: N17.0 ICD-9-CM: 584.5 Fatigue, unspecified type     ICD-10-CM: R53.83 ICD-9-CM: 780.79 Erectile dysfunction, unspecified erectile dysfunction type     ICD-10-CM: N52.9 ICD-9-CM: 607.84 Vitals BP Pulse Temp Resp Height(growth percentile) Weight(growth percentile) 118/82 (BP 1 Location: Left arm, BP Patient Position: Sitting) 79 98.1 °F (36.7 °C) (Oral) 20 5' 11\" (1.803 m) 228 lb 6.4 oz (103.6 kg) SpO2 BMI Smoking Status 98% 31.86 kg/m2 Former Smoker Vitals History BMI and BSA Data Body Mass Index Body Surface Area  
 31.86 kg/m 2 2.28 m 2 Preferred Pharmacy Pharmacy Name Phone [facility] [address] [phone] Your Updated Medication List  
  
   
This list is accurate as of: 11/6/17 11:32 AM.  Always use your most recent med list.  
  
  
  
  
 * Blood-Glucose Meter monitoring kit  
4 TIMESDAILY * Blood-Glucose Meter monitoring kit As directed  
  
 glucose blood VI test strips strip Commonly known as:  ACCU-CHEK ADVANTAGE TEST  
by Does Not Apply route four (4) times daily. 4 TIMES DAILY  
  
 insulin NPH/insulin regular 100 unit/mL (70-30) injection Commonly known as:  NOVOLIN 70/30, HUMULIN 70/30  
20 units q am,40 units q pm  
  
 vardenafil 20 mg tablet Commonly known as:  LEVITRA Take 20 mg by mouth as needed. * Notice: This list has 2 medication(s) that are the same as other medications prescribed for you. Read the directions carefully, and ask your doctor or other care provider to review them with you. We Performed the Following AMB POC GLUCOSE, QUANTITATIVE, BLOOD [76353 CPT(R)] AMB POC HEMOGLOBIN A1C [55671 CPT(R)] LIPID PANEL [50773 CPT(R)] METABOLIC PANEL, COMPREHENSIVE [15271 CPT(R)] TESTOSTERONE, FREE & TOTAL [17760 CPT(R)] Follow-up Instructions Return in about 3 months (around 2/6/2018). Introducing [facility] & HEALTH SERVICES! Anthony u[employer]erra introduces [employer] patient portal. Now you can access parts of your medical record, email your doctor's office, and request medication refills online. 1. In your internet browser, go to [email or web address] [email or web address] (SAM) 2. Click on the First Time User? Click Here link in the Sign In box. You will see the New Member Sign Up page. 3. Enter your Pidgon Access Code exactly as it appears below.  You will not need to use this code after youve completed the sign-up process. If you do not sign up before the expiration date, you must request a new code. · Offbeat Guides Access Code: [email or web address] Expires: 12/21/2017 11:39 AM 
 
4. Enter the last four digits of your Social Security Number (xxxx) and Date of Birth (mm/dd/yyyy) as indicated and click Submit. You will be taken to the next sign-up page. 5. Create a Offbeat Guides ID. This will be your [employer] login ID and cannot be changed, so think of one that is secure and easy to remember. 6. Create a Off[employer] password. You can change your password at any time. 7. Enter your Password Reset Question and Answer. This can be used at a later time if you forget your password. 8. Enter your e-mail address. You will receive e-mail notification when new information is available in [address] 9. Click Sign Up. You can now view and download portions of your medical record. 10. Click the Download Summary menu link to download a portable copy of your medical information. If you have questions, please visit the Frequently Asked Questions section of the [employer] website. Remember, [employer] is NOT to be used for urgent needs. For medical emergencies, dial 911. Now available from your iPhone and Android! Please provide this summary of care documentation to your next provider. Your primary care clinician is listed as Clinton Leos. If you have any questions after today's visit, please call [phone].

## 2017-11-06 NOTE — PROGRESS NOTES
HISTORY OF PRESENT ILLNESS  Martin Gil is a 49 y.o. male. Feeling ok ,fatigued ,appetite good. ,worried that he may not have much endurance,taking 70/30 insulin 40 units ,40 u    Diabetes   The history is provided by the patient. This is a chronic problem. The problem occurs daily. The problem has been gradually improving. The symptoms are aggravated by swallowing. sugars are erratic   Fatigue. Chronic,worries that he may have low testosterone. Soon to start working out    Review of Systems   Constitutional: Positive for malaise/fatigue . Negative for fever. Gastrointestinal: Positive for nausea. Genitourinary: Negative for dysuria, frequency and urgency. Musculoskeletal: Negative for myalgias. Physical Exam   Constitutional: He appears well-developed and well-nourished. HENT:   Head: Normocephalic and atraumatic. Right Ear: External ear normal.   Left Ear: External ear normal.   Nose: Nose normal.   Mouth/Throat: Oropharynx is clear and moist.   Cardiovascular: Normal rate and regular rhythm. Pulmonary/Chest: Effort normal.   Abdominal: Soft. Bowel sounds are normal.   Skin: Skin is warm and dry. Diagnoses and all orders for this visit:    1. Type 2 diabetes mellitus with both eyes affected by mild nonproliferative retinopathy without macular edema, with long-term current use of insulin ([facility])  -     METABOLIC PANEL, COMPREHENSIVE  -     AMB POC HEMOGLOBIN A1C  -     AMB POC GLUCOSE, QUANTITATIVE, BLOOD  -     LIPID PANEL    2. Acute renal failure with tubular necrosis ([facility])  -     METABOLIC PANEL, COMPREHENSIVE    3. Fatigue, unspecified type  -     TESTOSTERONE, FREE & TOTAL    4. Erectile dysfunction, unspecified erectile dysfunction type  -     TESTOSTERONE, FREE & TOTAL    Slowly improving,to see Endo next week. Continue current meds and treatments.     Follow-up Disposition: Not on File        Follow-up Disposition: Not on File

## 2017-11-07 LAB
ALBUMIN SERPL-MCNC: 4.5 G/DL (ref 3.5–5.5)
ALBUMIN/GLOB SERPL: 2 {RATIO} (ref 1.2–2.2)
ALP SERPL-CCNC: 115 IU/L (ref 39–117)
ALT SERPL-CCNC: 32 IU/L (ref 0–44)
AST SERPL-CCNC: 24 IU/L (ref 0–40)
BILIRUB SERPL-MCNC: 0.6 MG/DL (ref 0–1.2)
BUN SERPL-MCNC: 16 MG/DL (ref 6–24)
BUN/CREAT SERPL: 15 (ref 9–20)
CALCIUM SERPL-MCNC: 9.3 MG/DL (ref 8.7–10.2)
CHLORIDE SERPL-SCNC: 98 MMOL/L (ref 96–106)
CHOLEST SERPL-MCNC: 216 MG/DL (ref 100–199)
CO2 SERPL-SCNC: 26 MMOL/L (ref 18–29)
CREAT SERPL-MCNC: 1.05 MG/DL (ref 0.76–1.27)
GFR SERPLBLD CREATININE-BSD FMLA CKD-EPI: 84 ML/MIN/1.73
GFR SERPLBLD CREATININE-BSD FMLA CKD-EPI: 97 ML/MIN/1.73
GLOBULIN SER CALC-MCNC: 2.2 G/DL (ref 1.5–4.5)
GLUCOSE SERPL-MCNC: 143 MG/DL (ref 65–99)
HDLC SERPL-MCNC: 61 MG/DL
INTERPRETATION, 910389: NORMAL
LDLC SERPL CALC-MCNC: 121 MG/DL (ref 0–99)
POTASSIUM SERPL-SCNC: 4.1 MMOL/L (ref 3.5–5.2)
PROT SERPL-MCNC: 6.7 G/DL (ref 6–8.5)
SODIUM SERPL-SCNC: 142 MMOL/L (ref 134–144)
TESTOST FREE SERPL-MCNC: 12 PG/ML (ref 6.8–21.5)
TESTOST SERPL-MCNC: 462 NG/DL (ref 264–916)
TRIGL SERPL-MCNC: 171 MG/DL (ref 0–149)
VLDLC SERPL CALC-MCNC: 34 MG/DL (ref 5–40)

## 2018-07-23 ENCOUNTER — OFFICE VISIT (OUTPATIENT)
Dept: FAMILY MEDICINE CLINIC | Age: 48
End: 2018-07-23

## 2018-07-23 VITALS
BODY MASS INDEX: 32.9 KG/M2 | WEIGHT: 235 LBS | RESPIRATION RATE: 22 BRPM | DIASTOLIC BLOOD PRESSURE: 88 MMHG | SYSTOLIC BLOOD PRESSURE: 140 MMHG | TEMPERATURE: 97.9 F | OXYGEN SATURATION: 96 % | HEIGHT: 71 IN | HEART RATE: 83 BPM

## 2018-07-23 DIAGNOSIS — Z79.4 TYPE 2 DIABETES MELLITUS WITH BOTH EYES AFFECTED BY MILD NONPROLIFERATIVE RETINOPATHY WITHOUT MACULAR EDEMA, WITH LONG-TERM CURRENT USE OF INSULIN (HCC): Primary | ICD-10-CM

## 2018-07-23 DIAGNOSIS — K21.00 GASTROESOPHAGEAL REFLUX DISEASE WITH ESOPHAGITIS: Chronic | ICD-10-CM

## 2018-07-23 DIAGNOSIS — E13.319 RETINOPATHY DUE TO SECONDARY DIABETES (HCC): Chronic | ICD-10-CM

## 2018-07-23 DIAGNOSIS — E11.3293 TYPE 2 DIABETES MELLITUS WITH BOTH EYES AFFECTED BY MILD NONPROLIFERATIVE RETINOPATHY WITHOUT MACULAR EDEMA, WITH LONG-TERM CURRENT USE OF INSULIN (HCC): Primary | ICD-10-CM

## 2018-07-23 LAB
GLUCOSE POC: 252 MG/DL
HBA1C MFR BLD HPLC: 7.8 %

## 2018-07-23 RX ORDER — METFORMIN HYDROCHLORIDE 750 MG/1
TABLET, EXTENDED RELEASE ORAL
Refills: 1 | COMMUNITY
Start: 2018-05-24 | End: 2018-10-08 | Stop reason: SDUPTHER

## 2018-07-23 NOTE — MR AVS SNAPSHOT
[address] [facility] 
 
 
 [address] [facility] [place] 7 [address] 
[phone] Patient: Tabby Mcadams MRN: [id number] :1970 Visit Information Date & Time Provider Department Dept. Phone Encounter #  
 2018 11:00 AM John Wright [phone] [id number] Follow-up Instructions Return in about 3 months (around 10/23/2018). Upcoming Health Maintenance Date Due Pneumococcal 19-64 Highest Risk (1 of 3 - PCV13) 10/3/1988 EYE EXAM RETINAL OR DILATED Q1 2014 HEMOGLOBIN A1C Q6M 2018 MICROALBUMIN Q1 2018 Influenza Age 5 to Adult 2018 LIPID PANEL Q1 2018 FOOT EXAM Q1 2019 DTaP/Tdap/Td series (2 - Td) 2027 Allergies as of 2018  Review Complete On: 2018 By: Juanita Jesus MD  
 No Known Allergies Current Immunizations  Never Reviewed No immunizations on file. Not reviewed this visit You Were Diagnosed With   
  
 Codes Comments Type 2 diabetes mellitus with both eyes affected by mild nonproliferative retinopathy without macular edema, with long-term current use of insulin ([facility]    -  Primary ICD-10-CM: Y53.6960, Z79.4 ICD-9-CM: 250.50, 362.04, V58.67 Retinopathy due to secondary diabetes [facility])     ICD-10-CM: Z30.351 ICD-9-CM: 249.50, 362.01 Gastroesophageal reflux disease with esophagitis     ICD-10-CM: K21.0 ICD-9-CM: 530.11 Vitals BP Pulse Temp Resp Height(growth percentile) Weight(growth percentile) 140/88 (BP 1 Location: Right arm, BP Patient Position: Sitting) 83 97.9 °F (36.6 °C) (Oral) 22 5' 11\" (1.803 m) 235 lb (106.6 kg) SpO2 BMI Smoking Status 96% 32.78 kg/m2 Former Smoker Vitals History BMI and BSA Data Body Mass Index Body Surface Area 32.78 kg/m 2 2.31 m 2 Preferred Pharmacy Pharmacy Name Phone [facility] [address] [phone] Your Updated Medication List  
  
   
This list is accurate as of 7/23/18 11:34 AM.  Always use your most recent med list.  
  
  
  
  
 * Blood-Glucose Meter monitoring kit  
4 TIMESDAILY * Blood-Glucose Meter monitoring kit As directed  
  
 glucose blood VI test strips strip Commonly known as:  ACCU-CHEK ADVANTAGE TEST  
by Does Not Apply route four (4) times daily. 4 TIMES DAILY  
  
 insulin NPH/insulin regular 100 unit/mL (70-30) injection Commonly known as:  NOVOLIN 70/30, HUMULIN 70/30  
20 units q am,40 units q pm  
  
 metFORMIN  mg tablet Commonly known as:  GLUCOPHAGE XR  
  
 vardenafil 20 mg tablet Commonly known as:  LEVITRA Take 20 mg by mouth as needed. * Notice: This list has 2 medication(s) that are the same as other medications prescribed for you. Read the directions carefully, and ask your doctor or other care provider to review them with you. We Performed the Following AMB POC GLUCOSE, QUANTITATIVE, BLOOD [44659 CPT(R)] AMB POC HEMOGLOBIN A1C [81858 CPT(R)]  DIABETES FOOT EXAM [HM7 Custom] LIPID PANEL [61006 CPT(R)] METABOLIC PANEL, COMPREHENSIVE [87581 CPT(R)] MICROALBUMIN, UR, RAND W/ MICROALB/CREAT RATIO [id number] CPT(R)] Follow-up Instructions Return in about 3 months (around 10/23/2018). Introducing [facility] & HEALTH SERVICES! Kishore Kinney introduces [employer] patient portal. Now you can access parts of your medical record, email your doctor's office, and request medication refills online. 1. In your internet browser, go to [email or web address] 2. Click on the First Time User? Click Here link in the Sign In box. You will see the New Member Sign Up page. 3. Enter your [employer] Access Code exactly as it appears below. You will not need to use this code after youve completed the sign-up process.  If you do not sign up before the expiration date, you must request a new code. · KoalaDeal Access Code: [email or web address] Expires: 10/21/2018 11:14 AM 
 
4. Enter the last four digits of your Social Security Number (xxxx) and Date of Birth (mm/dd/yyyy) as indicated and click Submit. You will be taken to the next sign-up page. 5. Create a KoalaDeal ID. This will be your KoalaDeal login ID and cannot be changed, so think of one that is secure and easy to remember. 6. Create a KoalaDeal password. You can change your password at any time. 7. Enter your Password Reset Question and Answer. This can be used at a later time if you forget your password. 8. Enter your e-mail address. You will receive e-mail notification when new information is available in [address] 9. Click Sign Up. You can now view and download portions of your medical record. 10. Click the Download Summary menu link to download a portable copy of your medical information. If you have questions, please visit the Frequently Asked Questions section of the KoalaDeal website. Remember, KoalaDeal is NOT to be used for urgent needs. For medical emergencies, dial 911. Now available from your iPhone and Android! Please provide this summary of care documentation to your next provider. Your primary care clinician is listed as Pretty Rosas. If you have any questions after today's visit, please call [phone].

## 2018-07-23 NOTE — PROGRESS NOTES
HISTORY OF PRESENT ILLNESS  Lieutenant Brown is a 49 y.o. male. F/[id number] fairly well on nsulin 70/30 30 units bid. Feeling well,rare low spells  Diabetes   The history is provided by the patient. This is a chronic problem. The problem occurs daily. The problem has been gradually improving. Pertinent negatives include no chest pain, no abdominal pain, no headaches and no shortness of breath. Review of Systems   Constitutional: Negative for chills, fever and malaise/fatigue. Eyes: Positive for blurred vision. Negative for discharge and redness. Respiratory: Negative for shortness of breath. Cardiovascular: Negative for chest pain and palpitations. Gastrointestinal: Negative for abdominal pain and constipation. Genitourinary: Negative for dysuria, frequency and urgency. Musculoskeletal: Negative for joint pain. Neurological: Negative for headaches. Physical Exam   Constitutional: He is oriented to person, place, and time. He appears well-developed and well-nourished. HENT:   Head: Normocephalic and atraumatic. Right Ear: External ear normal.   Left Ear: External ear normal.   Nose: Nose normal.   Mouth/Throat: Oropharynx is clear and moist.   Cardiovascular: Normal rate and regular rhythm. Pulmonary/Chest: Effort normal and breath sounds normal.   Abdominal: Soft. Bowel sounds are normal.   Neurological: He is alert and oriented to person, place, and time. Skin: Skin is warm and dry. Comprehensive Diabetic Foot Exam  was performed         ASSESSMENT and PLAN  Diagnoses and all orders for this visit:    1.  Type 2 diabetes mellitus with both eyes affected by mild nonproliferative retinopathy without macular edema, with long-term current use of insulin ([facility])  -     AMB POC HEMOGLOBIN E0K  -     METABOLIC PANEL, COMPREHENSIVE  -     LIPID PANEL  -     MICROALBUMIN, UR, RAND W/ MICROALB/CREAT RATIO  -     AMB POC GLUCOSE, QUANTITATIVE, BLOOD  -     HM DIABETES FOOT EXAM  [order this if you have performed a diabetic foot exam today)    2. Retinopathy due to secondary diabetes ([facility]    3. Gastroesophageal reflux disease with esophagitis      Follow-up Disposition:  Return in about 3 months (around 10/23/2018).

## 2018-07-24 LAB
ALBUMIN SERPL-MCNC: 4.6 G/DL (ref 3.5–5.5)
ALBUMIN/CREAT UR: 174.4 MG/G CREAT (ref 0–30)
ALBUMIN/GLOB SERPL: 2.2 {RATIO} (ref 1.2–2.2)
ALP SERPL-CCNC: 130 IU/L (ref 39–117)
ALT SERPL-CCNC: 49 IU/L (ref 0–44)
AST SERPL-CCNC: 36 IU/L (ref 0–40)
BILIRUB SERPL-MCNC: 0.8 MG/DL (ref 0–1.2)
BUN SERPL-MCNC: 18 MG/DL (ref 6–24)
BUN/CREAT SERPL: 17 (ref 9–20)
CALCIUM SERPL-MCNC: 9.3 MG/DL (ref 8.7–10.2)
CHLORIDE SERPL-SCNC: 97 MMOL/L (ref 96–106)
CHOLEST SERPL-MCNC: 207 MG/DL (ref 100–199)
CO2 SERPL-SCNC: 24 MMOL/L (ref 20–29)
CREAT SERPL-MCNC: 1.03 MG/DL (ref 0.76–1.27)
CREAT UR-MCNC: 67.1 MG/DL
GLOBULIN SER CALC-MCNC: 2.1 G/DL (ref 1.5–4.5)
GLUCOSE SERPL-MCNC: 286 MG/DL (ref 65–99)
HDLC SERPL-MCNC: 52 MG/DL
INTERPRETATION, 910389: NORMAL
LDLC SERPL CALC-MCNC: 100 MG/DL (ref 0–99)
MICROALBUMIN UR-MCNC: 117 UG/ML
POTASSIUM SERPL-SCNC: 4.2 MMOL/L (ref 3.5–5.2)
PROT SERPL-MCNC: 6.7 G/DL (ref 6–8.5)
SODIUM SERPL-SCNC: 139 MMOL/L (ref 134–144)
TRIGL SERPL-MCNC: 276 MG/DL (ref 0–149)
VLDLC SERPL CALC-MCNC: 55 MG/DL (ref 5–40)

## 2018-07-25 DIAGNOSIS — Z79.4 TYPE 2 DIABETES MELLITUS WITH MILD NONPROLIFERATIVE RETINOPATHY WITHOUT MACULAR EDEMA, WITH LONG-TERM CURRENT USE OF INSULIN, UNSPECIFIED LATERALITY (HCC): ICD-10-CM

## 2018-07-25 DIAGNOSIS — E11.3299 TYPE 2 DIABETES MELLITUS WITH MILD NONPROLIFERATIVE RETINOPATHY WITHOUT MACULAR EDEMA, WITH LONG-TERM CURRENT USE OF INSULIN, UNSPECIFIED LATERALITY (HCC): ICD-10-CM

## 2018-07-25 RX ORDER — METFORMIN HYDROCHLORIDE 750 MG/1
TABLET, EXTENDED RELEASE ORAL
Qty: 60 TAB | Refills: 11 | Status: SHIPPED | OUTPATIENT
Start: 2018-07-25 | End: 2019-08-12 | Stop reason: SDUPTHER

## 2018-07-28 PROBLEM — E11.21 TYPE 2 DIABETES WITH NEPHROPATHY (HCC): Status: ACTIVE | Noted: 2018-07-28

## 2018-09-07 DIAGNOSIS — I10 ESSENTIAL HYPERTENSION: Primary | ICD-10-CM

## 2018-09-07 RX ORDER — LISINOPRIL 10 MG/1
10 TABLET ORAL DAILY
Qty: 30 TAB | Refills: 0 | Status: SHIPPED | OUTPATIENT
Start: 2018-09-07 | End: 2018-10-08 | Stop reason: DRUGHIGH

## 2018-10-08 ENCOUNTER — OFFICE VISIT (OUTPATIENT)
Dept: FAMILY MEDICINE CLINIC | Age: 48
End: 2018-10-08

## 2018-10-08 VITALS
HEART RATE: 95 BPM | SYSTOLIC BLOOD PRESSURE: 138 MMHG | WEIGHT: 238 LBS | HEIGHT: 71 IN | RESPIRATION RATE: 22 BRPM | BODY MASS INDEX: 33.32 KG/M2 | TEMPERATURE: 98.1 F | OXYGEN SATURATION: 98 % | DIASTOLIC BLOOD PRESSURE: 90 MMHG

## 2018-10-08 DIAGNOSIS — I10 ESSENTIAL HYPERTENSION: ICD-10-CM

## 2018-10-08 DIAGNOSIS — E11.21 TYPE 2 DIABETES WITH NEPHROPATHY (HCC): Primary | ICD-10-CM

## 2018-10-08 DIAGNOSIS — E13.319 RETINOPATHY DUE TO SECONDARY DIABETES (HCC): Chronic | ICD-10-CM

## 2018-10-08 LAB
GLUCOSE POC: 230 MG/DL
HBA1C MFR BLD HPLC: 8 %

## 2018-10-08 RX ORDER — LISINOPRIL 20 MG/1
20 TABLET ORAL DAILY
Qty: 30 TAB | Refills: 11 | Status: SHIPPED | OUTPATIENT
Start: 2018-10-08 | End: 2019-10-10 | Stop reason: SDUPTHER

## 2018-10-08 NOTE — MR AVS SNAPSHOT
[address] [facility] 
 
 
 [address] [facility] [place] 7 [address] 
[phone] Patient: Ana Cristina Alfaro MRN: [id number] :1970 Visit Information Date & Time Provider Department Dept. Phone Encounter #  
 10/8/2018  4:15 PM Karey Calvert John Villeda [phone] [id number] Follow-up Instructions Return in about 3 months (around 2019). Your Appointments 10/23/2018  9:00 AM  
ROUTINE CARE with Karey Calvert MD  
[address]) Appt Note: 3 Mnth F/U  
 [address] [facility] [place] 7 [address]  
[phone] [address] [facility] [address] Upcoming Health Maintenance Date Due Pneumococcal 19-64 Highest Risk (1 of 3 - PCV13) 10/3/1988 EYE EXAM RETINAL OR DILATED Q1 2014 Influenza Age 5 to Adult 2018 HEMOGLOBIN A1C Q6M 2019 FOOT EXAM Q1 2019 MICROALBUMIN Q1 2019 LIPID PANEL Q1 2019 DTaP/Tdap/Td series (2 - Td) 2027 Allergies as of 10/8/2018  Review Complete On: 10/8/2018 By: Karey Calvert MD  
 No Known Allergies Current Immunizations  Never Reviewed No immunizations on file. Not reviewed this visit You Were Diagnosed With   
  
 Codes Comments Type 2 diabetes with nephropathy (HCC)    -  Primary ICD-10-CM: E11.21 
ICD-9-CM: 250.40, 583.81 Retinopathy due to secondary diabetes [facility])     ICD-10-CM: H99.131 ICD-9-CM: 249.50, 362.01 Essential hypertension     ICD-10-CM: I10 
ICD-9-CM: 401.9 Vitals BP Pulse Temp Resp Height(growth percentile) Weight(growth percentile) 138/90 (BP 1 Location: Left arm, BP Patient Position: Sitting) 95 98.1 °F (36.7 °C) (Oral) 22 5' 11\" (1.803 m) 238 lb (108 kg) SpO2 BMI Smoking Status 98% 33.19 kg/m2 Former Smoker BMI and BSA Data Body Mass Index Body Surface Area  
 33.19 kg/m 2 2.33 m 2 Preferred Pharmacy Pharmacy Name Phone [id number] [address] [place] [phone] Your Updated Medication List  
  
   
This list is accurate as of 10/8/18  4:52 PM.  Always use your most recent med list.  
  
  
  
  
 * Blood-Glucose Meter monitoring kit  
4 TIMESDAILY * Blood-Glucose Meter monitoring kit As directed  
  
 glucose blood VI test strips strip Commonly known as:  ACCU-CHEK ADVANTAGE TEST  
by Does Not Apply route four (4) times daily. 4 TIMES DAILY  
  
 insulin NPH/insulin regular 100 unit/mL (70-30) injection Commonly known as:  NOVOLIN 70/30, HUMULIN 70/30  
20 units q am,40 units q pm  
  
 lisinopril 20 mg tablet Commonly known as:  Amanda Anderson Take 1 Tab by mouth daily. metFORMIN  mg tablet Commonly known as:  GLUCOPHAGE XR  
take 1 tablet by mouth once daily  
  
 vardenafil 20 mg tablet Commonly known as:  LEVITRA Take 20 mg by mouth as needed. * Notice: This list has 2 medication(s) that are the same as other medications prescribed for you. Read the directions carefully, and ask your doctor or other care provider to review them with you. Prescriptions Sent to Pharmacy Refills  
 lisinopril (PRINIVIL, ZESTRIL) 20 mg tablet 11 Sig: Take 1 Tab by mouth daily. Class: Normal  
 Pharmacy: [id number] Aimee Piper, [address] [place] [address] #: [phone] Route: Oral  
  
We Performed the Following AMB POC GLUCOSE, QUANTITATIVE, BLOOD [76753 CPT(R)] AMB POC HEMOGLOBIN A1C [20414 CPT(R)] CHOLESTEROL, TOTAL [78426 CPT(R)] METABOLIC PANEL, COMPREHENSIVE [17145 CPT(R)] Follow-up Instructions Return in about 3 months (around 1/8/2019). Introducing [facility]!    
 [facility] introduces [employer] patient portal. Now you can access parts of your medical record, email your doctor's office, and request medication refills online. 1. In your internet browser, go to [email or web address] 2. Click on the First Time User? Click Here link in the Sign In box. You will see the New Member Sign Up page. 3. Enter your Anaqua Access Code exactly as it appears below. You will not need to use this code after youve completed the sign-up process. If you do not sign up before the expiration date, you must request a new code. · Anaqua Access Code: [email or web address] Expires: 10/21/2018 11:14 AM 
 
4. Enter the last four digits of your Social Security Number (xxxx) and Date of Birth (mm/dd/yyyy) as indicated and click Submit. You will be taken to the next sign-up page. 5. Create a Anaqua ID. This will be your Anaqua login ID and cannot be changed, so think of one that is secure and easy to remember. 6. Create a Anaqua password. You can change your password at any time. 7. Enter your Password Reset Question and Answer. This can be used at a later time if you forget your password. 8. Enter your e-mail address. You will receive e-mail notification when new information is available in [address] 9. Click Sign Up. You can now view and download portions of your medical record. 10. Click the Download Summary menu link to download a portable copy of your medical information. If you have questions, please visit the Frequently Asked Questions section of the Anaqua website. Remember, Anaqua is NOT to be used for urgent needs. For medical emergencies, dial 911. Now available from your iPhone and Android! Please provide this summary of care documentation to your next provider. Your primary care clinician is listed as Fredis Alva. If you have any questions after today's visit, please call [phone].

## 2018-10-08 NOTE — PROGRESS NOTES
HISTORY OF PRESENT ILLNESS  Adeline Stanton is a 51 y.o. male. F/UDM2,HBPdoing well,no low spells,on bid 70/30 premix. .Feeling well. .Energy level is good  Hypertension    The history is provided by the patient. This is a chronic problem. The problem has been gradually improving. Pertinent negatives include no chest pain, no orthopnea, no palpitations, no malaise/fatigue, no headaches, no peripheral edema, no dizziness and no shortness of breath. Diabetes   The history is provided by the patient. This is a chronic problem. The problem occurs daily. The problem has been gradually improving. Pertinent negatives include no chest pain, no headaches and no shortness of breath. Review of Systems   Constitutional: Negative for fever and malaise/fatigue. Respiratory: Negative for shortness of breath. Cardiovascular: Negative for chest pain, palpitations and orthopnea. Gastrointestinal: Negative for constipation. Genitourinary: Negative for frequency. Musculoskeletal: Negative for back pain. Skin: Negative for rash. Neurological: Negative for dizziness and headaches. Physical Exam   Constitutional: He is oriented to person, place, and time. He appears well-developed and well-nourished. HENT:   Head: Normocephalic and atraumatic. Right Ear: External ear normal.   Left Ear: External ear normal.   Nose: Nose normal.   Mouth/Throat: Oropharynx is clear and moist.   Cardiovascular: Normal rate and regular rhythm. Pulmonary/Chest: Effort normal and breath sounds normal.   Abdominal: Soft. Bowel sounds are normal.   Neurological: He is alert and oriented to person, place, and time. Skin: Skin is warm and dry. Diagnoses and all orders for this visit:    1.  Type 2 diabetes with nephropathy (HCC),borderline control,to day with insulin dose  -     METABOLIC PANEL, COMPREHENSIVE  -     CHOLESTEROL, TOTAL  -     AMB POC HEMOGLOBIN A1C  -     AMB POC GLUCOSE, QUANTITATIVE, BLOOD  - lisinopril (PRINIVIL, ZESTRIL) 20 mg tablet; Take 1 Tab by mouth daily. 2. Retinopathy due to secondary diabetes ([address]    3. Essential hypertension,improvingh,will incraese lisinopril  -     lisinopril (PRINIVIL, ZESTRIL) 20 mg tablet; Take 1 Tab by mouth daily. Follow-up Disposition:  Return in about 3 months (around 1/8/2019).     Follow-up Disposition: Not on File

## 2018-10-09 LAB
ALBUMIN SERPL-MCNC: 4.4 G/DL (ref 3.5–5.5)
ALBUMIN/GLOB SERPL: 2.4 {RATIO} (ref 1.2–2.2)
ALP SERPL-CCNC: 111 IU/L (ref 39–117)
ALT SERPL-CCNC: 44 IU/L (ref 0–44)
AST SERPL-CCNC: 36 IU/L (ref 0–40)
BILIRUB SERPL-MCNC: 0.6 MG/DL (ref 0–1.2)
BUN SERPL-MCNC: 17 MG/DL (ref 6–24)
BUN/CREAT SERPL: 14 (ref 9–20)
CALCIUM SERPL-MCNC: 9.2 MG/DL (ref 8.7–10.2)
CHLORIDE SERPL-SCNC: 99 MMOL/L (ref 96–106)
CHOLEST SERPL-MCNC: 198 MG/DL (ref 100–199)
CO2 SERPL-SCNC: 24 MMOL/L (ref 20–29)
CREAT SERPL-MCNC: 1.19 MG/DL (ref 0.76–1.27)
GLOBULIN SER CALC-MCNC: 1.8 G/DL (ref 1.5–4.5)
GLUCOSE SERPL-MCNC: 234 MG/DL (ref 65–99)
POTASSIUM SERPL-SCNC: 4.5 MMOL/L (ref 3.5–5.2)
PROT SERPL-MCNC: 6.2 G/DL (ref 6–8.5)
SODIUM SERPL-SCNC: 136 MMOL/L (ref 134–144)

## 2018-11-30 RX ORDER — BLOOD SUGAR DIAGNOSTIC
STRIP MISCELLANEOUS
Qty: 150 STRIP | Refills: 3 | Status: SHIPPED | OUTPATIENT
Start: 2018-11-30 | End: 2019-12-13 | Stop reason: SDUPTHER

## 2019-10-10 DIAGNOSIS — I10 ESSENTIAL HYPERTENSION: ICD-10-CM

## 2019-10-10 DIAGNOSIS — E11.21 TYPE 2 DIABETES WITH NEPHROPATHY (HCC): ICD-10-CM

## 2019-10-10 RX ORDER — LISINOPRIL 20 MG/1
20 TABLET ORAL DAILY
Qty: 30 TAB | Refills: 5 | Status: SHIPPED | OUTPATIENT
Start: 2019-10-10 | End: 2020-04-06

## 2019-10-10 NOTE — TELEPHONE ENCOUNTER
Last visit:1/8/19  Next visit:not scheduled at this time  Previous refill 10/08/18(30+11)    Requested Prescriptions     Pending Prescriptions Disp Refills    lisinopril (PRINIVIL, ZESTRIL) 20 mg tablet 30 Tab 11     Sig: Take 1 Tab by mouth daily.

## 2019-11-20 ENCOUNTER — OFFICE VISIT (OUTPATIENT)
Dept: FAMILY MEDICINE CLINIC | Age: 49
End: 2019-11-20

## 2019-11-20 VITALS
TEMPERATURE: 98 F | OXYGEN SATURATION: 96 % | SYSTOLIC BLOOD PRESSURE: 118 MMHG | HEART RATE: 85 BPM | HEIGHT: 71 IN | BODY MASS INDEX: 33.65 KG/M2 | DIASTOLIC BLOOD PRESSURE: 78 MMHG | WEIGHT: 240.4 LBS

## 2019-11-20 DIAGNOSIS — I10 ESSENTIAL HYPERTENSION: ICD-10-CM

## 2019-11-20 DIAGNOSIS — Z79.4 TYPE 2 DIABETES MELLITUS WITH MILD NONPROLIFERATIVE RETINOPATHY WITHOUT MACULAR EDEMA, WITH LONG-TERM CURRENT USE OF INSULIN, UNSPECIFIED LATERALITY (HCC): ICD-10-CM

## 2019-11-20 DIAGNOSIS — R35.1 NOCTURIA: ICD-10-CM

## 2019-11-20 DIAGNOSIS — E11.3299 TYPE 2 DIABETES MELLITUS WITH MILD NONPROLIFERATIVE RETINOPATHY WITHOUT MACULAR EDEMA, WITH LONG-TERM CURRENT USE OF INSULIN, UNSPECIFIED LATERALITY (HCC): ICD-10-CM

## 2019-11-20 DIAGNOSIS — E11.21 TYPE 2 DIABETES WITH NEPHROPATHY (HCC): Primary | ICD-10-CM

## 2019-11-20 LAB
BILIRUB UR QL STRIP: NEGATIVE
GLUCOSE POC: 67 MG/DL
GLUCOSE UR-MCNC: NEGATIVE MG/DL
HBA1C MFR BLD HPLC: 7.4 %
KETONES P FAST UR STRIP-MCNC: NEGATIVE MG/DL
PH UR STRIP: 5.5 [PH] (ref 4.6–8)
PROT UR QL STRIP: NEGATIVE
SP GR UR STRIP: 1 (ref 1–1.03)
UA UROBILINOGEN AMB POC: NORMAL (ref 0.2–1)
URINALYSIS CLARITY POC: CLEAR
URINALYSIS COLOR POC: YELLOW
URINE BLOOD POC: NEGATIVE
URINE LEUKOCYTES POC: NEGATIVE
URINE NITRITES POC: NEGATIVE

## 2019-11-20 NOTE — PROGRESS NOTES
Chief Complaint   Patient presents with    Diabetes     F/U on on diabetes.  Hypertension     F/U on BP.  Cholesterol Problem     F/U on cholesterol. 1. Have you been to the ER, urgent care clinic since your last visit? Hospitalized since your last visit? No    2. Have you seen or consulted any other health care providers outside of the [address] [place] [address] since your last visit? Include any pap smears or colon screening.  No

## 2019-11-20 NOTE — PROGRESS NOTES
Subjective:     Siddhartha Ferreira is a 47 y.o. male presenting for annual exam and complete physical.    Patient Active Problem List   Diagnosis Code    Esophageal reflux K21.9    ED (erectile dysfunction) N52.9    Encounter for long-term (current) use of other medications Z79.899    Retinopathy due to secondary diabetes ([address] E13.319    DKA (diabetic ketoacidoses) ([address] E11.10    Acute renal failure with tubular necrosis ([address] N17.0    Projectile vomiting with nausea R11.12    Type 2 diabetes mellitus with both eyes affected by mild nonproliferative retinopathy without macular edema, with long-term current use of insulin ([address] R58.8363, Z79.4    Type 2 diabetes with nephropathy ([address] E11.21     Patient Active Problem List    Diagnosis Date Noted    Type 2 diabetes with nephropathy ([address] 07/28/2018    Type 2 diabetes mellitus with both eyes affected by mild nonproliferative retinopathy without macular edema, with long-term current use of insulin ([address] 08/07/2017    Projectile vomiting with nausea 08/03/2017    Acute renal failure with tubular necrosis ([address] 08/01/2017    DKA (diabetic ketoacidoses) ([address] 07/31/2017    Retinopathy due to secondary diabetes ([address] 06/15/2012    Encounter for long-term (current) use of other medications 06/14/2012    Esophageal reflux 03/07/2012    ED (erectile dysfunction) 03/07/2012     Current Outpatient Medications   Medication Sig Dispense Refill    lisinopril (PRINIVIL, ZESTRIL) 20 mg tablet Take 1 Tab by mouth daily.  30 Tab 5    metFORMIN ER (GLUCOPHAGE XR) 750 mg tablet take 1 tablet by mouth once daily 60 Tab 5    ACCU-CHEK GUIDE strip TEST BEFORE BREAKFAST, LUNCH, DINNER AND AT BEDTIME 150 Strip 3    insulin NPH/insulin regular (NOVOLIN 70/30, HUMULIN 70/30) 100 unit/mL (70-30) injection 20 units q am,40 units q pm (Patient taking differently: 20 units q am,40 units q pm (uses sliding scale)) 20 mL 11    Blood-Glucose Meter monitoring kit As directed 1 Kit 0    Blood-Glucose Meter monitoring kit 4 TIMESDAILY 1 Kit 0    vardenafil (LEVITRA) 20 mg tablet Take 20 mg by mouth as needed. 3 Tab 5     No Known Allergies  Past Medical History:   Diagnosis Date    [facility]     ED (erectile dysfunction) 3/7/2012    Encounter for long-term (current) use of other medications 6/14/2012    Esophageal reflux 3/7/2012    IDDM (insulin dependent diabetes mellitus) ([facility] 2/13/2012    Retinopathy due to secondary diabetes ([facility] 6/15/2012    Type II or unspecified type diabetes mellitus with neurological manifestations, not stated as uncontrolled(250.60) ([facility] 2/13/2012     Past Surgical History:   Procedure Laterality Date    HX ORTHOPAEDIC  1988    Knee surgery.      Family History   Problem Relation Age of Onset    Diabetes Mother     Heart Disease Mother     Hypertension Mother     Heart Disease Father      Social History     Tobacco Use    Smoking status: Former Smoker    Smokeless tobacco: Former User     Quit date: 5/13/1995   Substance Use Topics    Alcohol use: Yes     Comment: socially             Review of Systems  Constitutional: negative  Eyes: negative  Ears, nose, mouth, throat, and face: negative  Respiratory: negative  Cardiovascular: negative  Gastrointestinal: negative  Genitourinary:negative  Integument/breast: negative  Musculoskeletal:positive for arthralgias  Neurological: negative    Objective:     Visit Vitals  /78 (BP 1 Location: Right arm, BP Patient Position: Sitting)   Pulse 85   Temp 98 °F (36.7 °C) (Oral)   Ht 5' 11\" (1.803 m)   Wt 240 lb 6.4 oz (109 kg)   SpO2 96%   BMI 33.53 kg/m²     Physical exam:   General appearance - alert, well appearing, and in no distress  Mental status - alert, oriented to person, place, and time  Eyes - pupils equal and reactive, extraocular eye movements intact  Ears - bilateral TM's and external ear canals normal  Nose - normal and patent, no erythema, discharge or polyps  Mouth - mucous membranes moist, pharynx normal without lesions  Neck - supple, no significant adenopathy, carotids upstroke normal bilaterally, no bruits  Chest - clear to auscultation, no wheezes, rales or rhonchi, symmetric air entry  Heart - normal rate, regular rhythm, normal S1, S2, no murmurs, rubs, clicks or gallops  Abdomen - soft, nontender, nondistended, no masses or organomegaly  Rectal - negative without mass, lesions or tenderness, stool guaiac negative, PROSTATE EXAM: smooth and symmetric without nodules or tenderness  Musculoskeletal - no joint tenderness, deformity or swelling  Extremities - peripheral pulses normal, no pedal edema, no clubbing or cyanosis  Skin - normal coloration and turgor, no rashes, no suspicious skin lesions noted     Assessment/Plan:     Well Male Exam  continue present plan, routine labs ordered, call if any problems. Diagnoses and all orders for this visit:    1. Type 2 diabetes with nephropathy (HCC)  -     METABOLIC PANEL, COMPREHENSIVE  -     LIPID PANEL  -     AMB POC URINALYSIS DIP STICK AUTO W/O MICRO  -     MICROALBUMIN, UR, RAND W/ MICROALB/CREAT RATIO  -     AMB POC GLUCOSE, QUANTITATIVE, BLOOD  -     AMB POC HEMOGLOBIN A1C    2. Essential hypertension  -     METABOLIC PANEL, COMPREHENSIVE  -     CBC WITH AUTOMATED DIFF  -     AMB POC URINALYSIS DIP STICK AUTO W/O MICRO    3. Type 2 diabetes mellitus with mild nonproliferative retinopathy without macular edema, with long-term current use of insulin, unspecified laterality ([facility]    4. Nocturia  -     PSA, DIAGNOSTIC (PROSTATE SPECIFIC AG)      Follow-up and Dispositions    · Return in about 6 months (around 5/20/2020).      Shashank Medellin

## 2019-11-21 LAB
ALBUMIN SERPL-MCNC: 4.3 G/DL (ref 3.5–5.5)
ALBUMIN/CREAT UR: 36.4 MG/G CREAT (ref 0–30)
ALBUMIN/GLOB SERPL: 1.8 {RATIO} (ref 1.2–2.2)
ALP SERPL-CCNC: 99 IU/L (ref 39–117)
ALT SERPL-CCNC: 39 IU/L (ref 0–44)
AST SERPL-CCNC: 30 IU/L (ref 0–40)
BASOPHILS # BLD AUTO: 0 X10E3/UL (ref 0–0.2)
BASOPHILS NFR BLD AUTO: 1 %
BILIRUB SERPL-MCNC: 0.4 MG/DL (ref 0–1.2)
BUN SERPL-MCNC: 22 MG/DL (ref 6–24)
BUN/CREAT SERPL: 19 (ref 9–20)
CALCIUM SERPL-MCNC: 9.7 MG/DL (ref 8.7–10.2)
CHLORIDE SERPL-SCNC: 98 MMOL/L (ref 96–106)
CHOLEST SERPL-MCNC: 218 MG/DL (ref 100–199)
CO2 SERPL-SCNC: 24 MMOL/L (ref 20–29)
CREAT SERPL-MCNC: 1.18 MG/DL (ref 0.76–1.27)
CREAT UR-MCNC: 31.6 MG/DL
EOSINOPHIL # BLD AUTO: 0.2 X10E3/UL (ref 0–0.4)
EOSINOPHIL NFR BLD AUTO: 3 %
ERYTHROCYTE [DISTWIDTH] IN BLOOD BY AUTOMATED COUNT: 12.7 % (ref 12.3–15.4)
GLOBULIN SER CALC-MCNC: 2.4 G/DL (ref 1.5–4.5)
GLUCOSE SERPL-MCNC: 77 MG/DL (ref 65–99)
HCT VFR BLD AUTO: 44.8 % (ref 37.5–51)
HDLC SERPL-MCNC: 43 MG/DL
HGB BLD-MCNC: 15.7 G/DL (ref 13–17.7)
IMM GRANULOCYTES # BLD AUTO: 0 X10E3/UL (ref 0–0.1)
IMM GRANULOCYTES NFR BLD AUTO: 0 %
INTERPRETATION, 910389: NORMAL
LDLC SERPL CALC-MCNC: 118 MG/DL (ref 0–99)
LYMPHOCYTES # BLD AUTO: 1.7 X10E3/UL (ref 0.7–3.1)
LYMPHOCYTES NFR BLD AUTO: 28 %
Lab: NORMAL
MCH RBC QN AUTO: 31.3 PG (ref 26.6–33)
MCHC RBC AUTO-ENTMCNC: 35 G/DL (ref 31.5–35.7)
MCV RBC AUTO: 89 FL (ref 79–97)
MICROALBUMIN UR-MCNC: 11.5 UG/ML
MONOCYTES # BLD AUTO: 0.6 X10E3/UL (ref 0.1–0.9)
MONOCYTES NFR BLD AUTO: 11 %
NEUTROPHILS # BLD AUTO: 3.4 X10E3/UL (ref 1.4–7)
NEUTROPHILS NFR BLD AUTO: 57 %
PLATELET # BLD AUTO: 206 X10E3/UL (ref 150–450)
POTASSIUM SERPL-SCNC: 4.3 MMOL/L (ref 3.5–5.2)
PROT SERPL-MCNC: 6.7 G/DL (ref 6–8.5)
PSA SERPL-MCNC: 0.3 NG/ML (ref 0–4)
RBC # BLD AUTO: 5.01 X10E6/UL (ref 4.14–5.8)
SODIUM SERPL-SCNC: 138 MMOL/L (ref 134–144)
TRIGL SERPL-MCNC: 287 MG/DL (ref 0–149)
VLDLC SERPL CALC-MCNC: 57 MG/DL (ref 5–40)
WBC # BLD AUTO: 6 X10E3/UL (ref 3.4–10.8)

## 2019-12-13 RX ORDER — BLOOD SUGAR DIAGNOSTIC
STRIP MISCELLANEOUS
Qty: 150 STRIP | Refills: 5 | Status: SHIPPED | OUTPATIENT
Start: 2019-12-13 | End: 2021-03-10

## 2020-11-17 ENCOUNTER — OFFICE VISIT (OUTPATIENT)
Dept: FAMILY MEDICINE CLINIC | Age: 50
End: 2020-11-17
Payer: COMMERCIAL

## 2020-11-17 VITALS
HEART RATE: 79 BPM | BODY MASS INDEX: 32.2 KG/M2 | WEIGHT: 230 LBS | DIASTOLIC BLOOD PRESSURE: 88 MMHG | TEMPERATURE: 97.1 F | SYSTOLIC BLOOD PRESSURE: 138 MMHG | OXYGEN SATURATION: 98 % | RESPIRATION RATE: 20 BRPM | HEIGHT: 71 IN

## 2020-11-17 DIAGNOSIS — K21.00 GASTROESOPHAGEAL REFLUX DISEASE WITH ESOPHAGITIS WITHOUT HEMORRHAGE: ICD-10-CM

## 2020-11-17 DIAGNOSIS — I10 ESSENTIAL HYPERTENSION: ICD-10-CM

## 2020-11-17 DIAGNOSIS — R35.1 NOCTURIA: ICD-10-CM

## 2020-11-17 DIAGNOSIS — Z79.4 TYPE 2 DIABETES MELLITUS WITH MILD NONPROLIFERATIVE RETINOPATHY WITHOUT MACULAR EDEMA, WITH LONG-TERM CURRENT USE OF INSULIN, UNSPECIFIED LATERALITY (HCC): Primary | ICD-10-CM

## 2020-11-17 DIAGNOSIS — E11.3299 TYPE 2 DIABETES MELLITUS WITH MILD NONPROLIFERATIVE RETINOPATHY WITHOUT MACULAR EDEMA, WITH LONG-TERM CURRENT USE OF INSULIN, UNSPECIFIED LATERALITY (HCC): Primary | ICD-10-CM

## 2020-11-17 DIAGNOSIS — E11.21 TYPE 2 DIABETES WITH NEPHROPATHY (HCC): ICD-10-CM

## 2020-11-17 DIAGNOSIS — Z12.11 SCREEN FOR COLON CANCER: ICD-10-CM

## 2020-11-17 PROCEDURE — 99386 PREV VISIT NEW AGE 40-64: CPT | Performed by: FAMILY MEDICINE

## 2020-11-17 RX ORDER — LISINOPRIL 20 MG/1
TABLET ORAL
Qty: 90 TAB | Refills: 1 | Status: SHIPPED | OUTPATIENT
Start: 2020-11-17 | End: 2021-06-11

## 2020-11-17 NOTE — PROGRESS NOTES
Subjective:     Kush Chacon is a 45 y.o. male presenting for annual exam and complete physical.    Patient Active Problem List   Diagnosis Code    Esophageal reflux K21.9    ED (erectile dysfunction) N52.9    Encounter for long-term (current) use of other medications Z79.899    Retinopathy due to secondary diabetes ([address] E13.319    DKA (diabetic ketoacidoses) ([address] E11.10    Acute renal failure with tubular necrosis ([address] N17.0    Projectile vomiting with nausea R11.12    Type 2 diabetes mellitus with both eyes affected by mild nonproliferative retinopathy without macular edema, with long-term current use of insulin ([address] D22.6413, Z79.4    Type 2 diabetes with nephropathy ([address] E11.21     Patient Active Problem List    Diagnosis Date Noted    Type 2 diabetes with nephropathy ([address] 07/28/2018    Type 2 diabetes mellitus with both eyes affected by mild nonproliferative retinopathy without macular edema, with long-term current use of insulin ([address] 08/07/2017    Projectile vomiting with nausea 08/03/2017    Acute renal failure with tubular necrosis ([address] 08/01/2017    DKA (diabetic ketoacidoses) ([address] 07/31/2017    Retinopathy due to secondary diabetes ([address] 06/15/2012    Encounter for long-term (current) use of other medications 06/14/2012    Esophageal reflux 03/07/2012    ED (erectile dysfunction) 03/07/2012     Current Outpatient Medications   Medication Sig Dispense Refill    lisinopriL (PRINIVIL, ZESTRIL) 20 mg tablet Take 1 tablet by mouth once daily 90 Tab 1    metFORMIN ER (GLUCOPHAGE XR) 750 mg tablet Take 1 tablet by mouth once daily 60 Tab 2    ACCU-CHEK GUIDE strip USE 1 STRIP TO CHECK GLUCOSE BEFORE BREAKFAST, LUNCH, DINNER, AND AT BEDTIME 150 Strip 5    insulin NPH/insulin regular (NOVOLIN 70/30, HUMULIN 70/30) 100 unit/mL (70-30) injection 20 units q am,40 units q pm (Patient taking differently: 20 units q am,40 units q pm (uses sliding scale)) 20 mL 11    Blood-Glucose Meter monitoring kit 4 TIMESDAILY 1 Kit 0    vardenafil (LEVITRA) 20 mg tablet Take 20 mg by mouth as needed. 3 Tab 5     No Known Allergies  Past Medical History:   Diagnosis Date    Diabetes ([facility]     ED (erectile dysfunction) 3/7/2012    Encounter for long-term (current) use of other medications 6/14/2012    Esophageal reflux 3/7/2012    IDDM (insulin dependent diabetes mellitus) 2/13/2012    Retinopathy due to secondary diabetes ([facility] 6/15/2012    Type II or unspecified type diabetes mellitus with neurological manifestations, not stated as uncontrolled(250.60) ([facility] 2/13/2012     Past Surgical History:   Procedure Laterality Date    HX ORTHOPAEDIC  1988    Knee surgery.      Family History   Problem Relation Age of Onset    Diabetes Mother     Heart Disease Mother     Hypertension Mother     Heart Disease Father      Social History     Tobacco Use    Smoking status: Former Smoker    Smokeless tobacco: Former User     Quit date: 5/13/1995   Substance Use Topics    Alcohol use: Yes     Comment: socially             Review of Systems  Constitutional: negative  Eyes: negative  Ears, nose, mouth, throat, and face: negative  Respiratory: negative  Cardiovascular: negative  Gastrointestinal: negative  Genitourinary:negative  Integument/breast: negative  Musculoskeletal:negative  Neurological: negative  Behavioral/Psych: negative    Objective:     Visit Vitals  /88 (BP 1 Location: Left arm, BP Patient Position: Sitting)   Pulse 79   Temp 97.1 °F (36.2 °C) (Temporal)   Resp 20   Ht 5' 11\" (1.803 m)   Wt 230 lb (104.3 kg)   SpO2 98%   BMI 32.08 kg/m²     Physical exam:   General appearance - alert, well appearing, and in no distress  Mental status - alert, oriented to person, place, and time  Eyes - pupils equal and reactive, extraocular eye movements intact  Ears - bilateral TM's and external ear canals normal  Nose - normal and patent, no erythema, discharge or polyps  Mouth - mucous membranes moist, pharynx normal without lesions  Neck - supple, no significant adenopathy, carotids upstroke normal bilaterally, no bruits, thyroid exam: thyroid is normal in size without nodules or tenderness  Chest - clear to auscultation, no wheezes, rales or rhonchi, symmetric air entry  Heart - normal rate, regular rhythm, normal S1, S2, no murmurs, rubs, clicks or gallops  Abdomen - soft, nontender, nondistended, no masses or organomegaly  Rectal - negative without mass, lesions or tenderness,stool guiac negative,prostate wnl  Musculoskeletal - no joint tenderness, deformity or swelling  Extremities - peripheral pulses normal, no pedal edema, no clubbing or cyanosis  Skin - normal coloration and turgor, no rashes, no suspicious skin lesions noted     Assessment/Plan:     continue present plan, routine labs ordered, call if any problems. Diagnoses and all orders for this visit:    1. Type 2 diabetes mellitus with mild nonproliferative retinopathy without macular edema, with long-term current use of insulin, unspecified laterality (HCC)  -     METABOLIC PANEL, COMPREHENSIVE  -     LIPID PANEL  -     MICROALBUMIN, UR, RAND W/ MICROALB/CREAT RATIO  -     HEMOGLOBIN A1C WITH EAG    2. Type 2 diabetes with nephropathy (HCC)  -     lisinopriL (PRINIVIL, ZESTRIL) 20 mg tablet; Take 1 tablet by mouth once daily    3. Essential hypertension  -     CBC WITH AUTOMATED DIFF  -     AMB POC URINALYSIS DIP STICK AUTO W/O MICRO  -     lisinopriL (PRINIVIL, ZESTRIL) 20 mg tablet; Take 1 tablet by mouth once daily    4. Gastroesophageal reflux disease with esophagitis without hemorrhage    5. Nocturia  -     PSA, DIAGNOSTIC (PROSTATE SPECIFIC AG)    6. Screen for colon cancer  -     OCCULT BLOOD IMMUNOASSAY,DIAGNOSTIC      Follow-up and Dispositions    · Return in about 6 months (around 5/17/2021).      Iveth Carpenter

## 2020-11-17 NOTE — PROGRESS NOTES
Chief Complaint   Patient presents with    Diabetes     follow up    Hypertension     follow up     1. Have you been to the ER, urgent care clinic since your last visit? Hospitalized since your last visit?no    2. Have you seen or consulted any other health care providers outside of the [address] [place] [address] since your last visit? Include any pap smears or colon screening.  no

## 2021-06-11 DIAGNOSIS — I10 ESSENTIAL HYPERTENSION: ICD-10-CM

## 2021-06-11 DIAGNOSIS — E11.21 TYPE 2 DIABETES WITH NEPHROPATHY (HCC): ICD-10-CM

## 2021-06-11 RX ORDER — LISINOPRIL 20 MG/1
TABLET ORAL
Qty: 90 TABLET | Refills: 0 | Status: SHIPPED | OUTPATIENT
Start: 2021-06-11 | End: 2021-09-13

## 2021-12-14 DIAGNOSIS — E11.3299 TYPE 2 DIABETES MELLITUS WITH MILD NONPROLIFERATIVE RETINOPATHY WITHOUT MACULAR EDEMA, WITH LONG-TERM CURRENT USE OF INSULIN, UNSPECIFIED LATERALITY (HCC): ICD-10-CM

## 2021-12-14 DIAGNOSIS — I10 ESSENTIAL HYPERTENSION: ICD-10-CM

## 2021-12-14 DIAGNOSIS — E11.21 TYPE 2 DIABETES WITH NEPHROPATHY (HCC): ICD-10-CM

## 2021-12-14 DIAGNOSIS — Z79.4 TYPE 2 DIABETES MELLITUS WITH MILD NONPROLIFERATIVE RETINOPATHY WITHOUT MACULAR EDEMA, WITH LONG-TERM CURRENT USE OF INSULIN, UNSPECIFIED LATERALITY (HCC): ICD-10-CM

## 2021-12-15 RX ORDER — METFORMIN HYDROCHLORIDE 750 MG/1
TABLET, EXTENDED RELEASE ORAL
Qty: 30 TABLET | Refills: 0 | Status: SHIPPED | OUTPATIENT
Start: 2021-12-15 | End: 2022-01-23

## 2021-12-15 RX ORDER — LISINOPRIL 20 MG/1
TABLET ORAL
Qty: 90 TABLET | Refills: 0 | Status: SHIPPED | OUTPATIENT
Start: 2021-12-15 | End: 2022-03-25

## 2022-01-21 DIAGNOSIS — E11.3299 TYPE 2 DIABETES MELLITUS WITH MILD NONPROLIFERATIVE RETINOPATHY WITHOUT MACULAR EDEMA, WITH LONG-TERM CURRENT USE OF INSULIN, UNSPECIFIED LATERALITY (HCC): ICD-10-CM

## 2022-01-21 DIAGNOSIS — Z79.4 TYPE 2 DIABETES MELLITUS WITH MILD NONPROLIFERATIVE RETINOPATHY WITHOUT MACULAR EDEMA, WITH LONG-TERM CURRENT USE OF INSULIN, UNSPECIFIED LATERALITY (HCC): ICD-10-CM

## 2022-01-23 RX ORDER — METFORMIN HYDROCHLORIDE 750 MG/1
TABLET, EXTENDED RELEASE ORAL
Qty: 30 TABLET | Refills: 0 | Status: SHIPPED | OUTPATIENT
Start: 2022-01-23 | End: 2022-02-28

## 2022-02-28 DIAGNOSIS — E11.3299 TYPE 2 DIABETES MELLITUS WITH MILD NONPROLIFERATIVE RETINOPATHY WITHOUT MACULAR EDEMA, WITH LONG-TERM CURRENT USE OF INSULIN, UNSPECIFIED LATERALITY (HCC): ICD-10-CM

## 2022-02-28 DIAGNOSIS — Z79.4 TYPE 2 DIABETES MELLITUS WITH MILD NONPROLIFERATIVE RETINOPATHY WITHOUT MACULAR EDEMA, WITH LONG-TERM CURRENT USE OF INSULIN, UNSPECIFIED LATERALITY (HCC): ICD-10-CM

## 2022-02-28 RX ORDER — METFORMIN HYDROCHLORIDE 750 MG/1
TABLET, EXTENDED RELEASE ORAL
Qty: 30 TABLET | Refills: 0 | Status: SHIPPED | OUTPATIENT
Start: 2022-02-28 | End: 2022-03-31

## 2022-03-18 PROBLEM — R11.12 PROJECTILE VOMITING WITH NAUSEA: Status: ACTIVE | Noted: 2017-08-03

## 2022-03-18 PROBLEM — N17.0 ACUTE RENAL FAILURE WITH TUBULAR NECROSIS (HCC): Status: ACTIVE | Noted: 2017-08-01

## 2022-03-19 PROBLEM — E11.3293 TYPE 2 DIABETES MELLITUS WITH BOTH EYES AFFECTED BY MILD NONPROLIFERATIVE RETINOPATHY WITHOUT MACULAR EDEMA, WITH LONG-TERM CURRENT USE OF INSULIN (HCC): Status: ACTIVE | Noted: 2017-08-07

## 2022-03-19 PROBLEM — E11.21 TYPE 2 DIABETES WITH NEPHROPATHY (HCC): Status: ACTIVE | Noted: 2018-07-28

## 2022-03-19 PROBLEM — Z79.4 TYPE 2 DIABETES MELLITUS WITH BOTH EYES AFFECTED BY MILD NONPROLIFERATIVE RETINOPATHY WITHOUT MACULAR EDEMA, WITH LONG-TERM CURRENT USE OF INSULIN (HCC): Status: ACTIVE | Noted: 2017-08-07

## 2022-03-25 DIAGNOSIS — I10 ESSENTIAL HYPERTENSION: ICD-10-CM

## 2022-03-25 DIAGNOSIS — E11.21 TYPE 2 DIABETES WITH NEPHROPATHY (HCC): ICD-10-CM

## 2022-03-25 RX ORDER — LISINOPRIL 20 MG/1
TABLET ORAL
Qty: 90 TABLET | Refills: 0 | Status: SHIPPED | OUTPATIENT
Start: 2022-03-25 | End: 2022-04-13 | Stop reason: SDUPTHER

## 2022-03-31 DIAGNOSIS — Z79.4 TYPE 2 DIABETES MELLITUS WITH MILD NONPROLIFERATIVE RETINOPATHY WITHOUT MACULAR EDEMA, WITH LONG-TERM CURRENT USE OF INSULIN, UNSPECIFIED LATERALITY (HCC): ICD-10-CM

## 2022-03-31 DIAGNOSIS — E11.3299 TYPE 2 DIABETES MELLITUS WITH MILD NONPROLIFERATIVE RETINOPATHY WITHOUT MACULAR EDEMA, WITH LONG-TERM CURRENT USE OF INSULIN, UNSPECIFIED LATERALITY (HCC): ICD-10-CM

## 2022-03-31 RX ORDER — METFORMIN HYDROCHLORIDE 750 MG/1
TABLET, EXTENDED RELEASE ORAL
Qty: 30 TABLET | Refills: 0 | Status: SHIPPED | OUTPATIENT
Start: 2022-03-31 | End: 2022-04-13 | Stop reason: SDUPTHER

## 2022-04-13 ENCOUNTER — OFFICE VISIT (OUTPATIENT)
Dept: FAMILY MEDICINE CLINIC | Age: 52
End: 2022-04-13
Payer: COMMERCIAL

## 2022-04-13 VITALS
SYSTOLIC BLOOD PRESSURE: 134 MMHG | RESPIRATION RATE: 18 BRPM | HEIGHT: 71 IN | BODY MASS INDEX: 34.35 KG/M2 | OXYGEN SATURATION: 99 % | TEMPERATURE: 98.2 F | HEART RATE: 84 BPM | DIASTOLIC BLOOD PRESSURE: 80 MMHG | WEIGHT: 245.4 LBS

## 2022-04-13 DIAGNOSIS — R35.1 NOCTURIA: ICD-10-CM

## 2022-04-13 DIAGNOSIS — I10 ESSENTIAL HYPERTENSION: ICD-10-CM

## 2022-04-13 DIAGNOSIS — Z79.4 TYPE 2 DIABETES MELLITUS WITH MILD NONPROLIFERATIVE RETINOPATHY WITHOUT MACULAR EDEMA, WITH LONG-TERM CURRENT USE OF INSULIN, UNSPECIFIED LATERALITY (HCC): Primary | ICD-10-CM

## 2022-04-13 DIAGNOSIS — E11.21 TYPE 2 DIABETES WITH NEPHROPATHY (HCC): ICD-10-CM

## 2022-04-13 DIAGNOSIS — Z12.11 SCREEN FOR COLON CANCER: ICD-10-CM

## 2022-04-13 DIAGNOSIS — E11.3299 TYPE 2 DIABETES MELLITUS WITH MILD NONPROLIFERATIVE RETINOPATHY WITHOUT MACULAR EDEMA, WITH LONG-TERM CURRENT USE OF INSULIN, UNSPECIFIED LATERALITY (HCC): Primary | ICD-10-CM

## 2022-04-13 LAB — HBA1C MFR BLD HPLC: 7.2 %

## 2022-04-13 PROCEDURE — 83036 HEMOGLOBIN GLYCOSYLATED A1C: CPT | Performed by: FAMILY MEDICINE

## 2022-04-13 PROCEDURE — 99386 PREV VISIT NEW AGE 40-64: CPT | Performed by: FAMILY MEDICINE

## 2022-04-13 RX ORDER — METFORMIN HYDROCHLORIDE 750 MG/1
750 TABLET, EXTENDED RELEASE ORAL DAILY
Qty: 90 TABLET | Refills: 3 | Status: SHIPPED | OUTPATIENT
Start: 2022-04-13 | End: 2022-05-05

## 2022-04-13 RX ORDER — LISINOPRIL 20 MG/1
20 TABLET ORAL DAILY
Qty: 90 TABLET | Refills: 3 | Status: SHIPPED | OUTPATIENT
Start: 2022-04-13

## 2022-04-13 NOTE — PROGRESS NOTES
Subjective:     Shania Tao is a 45 y.o. male presenting for annual exam and complete physical.Followed for DM2 with retinopathy,HBP,Lumbago with rt sciatica. Doing well    Patient Active Problem List    Diagnosis Date Noted    Type 2 diabetes with nephropathy ([facility] 07/28/2018    Type 2 diabetes mellitus with both eyes affected by mild nonproliferative retinopathy without macular edema, with long-term current use of insulin ([address] 08/07/2017    Projectile vomiting with nausea 08/03/2017    Acute renal failure with tubular necrosis ([address] 08/01/2017    DKA (diabetic ketoacidoses) 07/31/2017    Retinopathy due to secondary diabetes ([address] 06/15/2012    Encounter for long-term (current) use of other medications 06/14/2012    Esophageal reflux 03/07/2012    ED (erectile dysfunction) 03/07/2012     Current Outpatient Medications   Medication Sig Dispense Refill    metFORMIN ER (GLUCOPHAGE XR) 750 mg tablet Take 1 tablet by mouth once daily 30 Tablet 0    lisinopriL (PRINIVIL, ZESTRIL) 20 mg tablet Take 1 tablet by mouth once daily 90 Tablet 0    Accu-Chek Guide test strips strip USE 1 STRIP TO CHECK GLUCOSE BEFORE BREAKFAST LUNCH,  DINNER,  AND  AT  BEDTIME 150 Strip 5    insulin NPH/insulin regular (NOVOLIN 70/30, HUMULIN 70/30) 100 unit/mL (70-30) injection 20 units q am,40 units q pm (Patient taking differently: 20 units q am,40 units q pm (uses sliding scale)) 20 mL 11    vardenafil (LEVITRA) 20 mg tablet Take 20 mg by mouth as needed.  3 Tab 5    Blood-Glucose Meter monitoring kit 4 TIMESDAILY 1 Kit 0     No Known Allergies  Past Medical History:   Diagnosis Date    Diabetes ([address]     ED (erectile dysfunction) 3/7/2012    Encounter for long-term (current) use of other medications 6/14/2012    Esophageal reflux 3/7/2012    IDDM (insulin dependent diabetes mellitus) 2/13/2012    Retinopathy due to secondary diabetes ([address] 6/15/2012    Type II or unspecified type diabetes mellitus with neurological manifestations, not stated as uncontrolled(250.60) ([facility] 2/13/2012     Past Surgical History:   Procedure Laterality Date    HX ORTHOPAEDIC  1988    Knee surgery.      Family History   Problem Relation Age of Onset    Diabetes Mother     Heart Disease Mother     Hypertension Mother     Heart Disease Father      Social History     Tobacco Use    Smoking status: Former Smoker    Smokeless tobacco: Former User     Quit date: 5/13/1995   Substance Use Topics    Alcohol use: Yes     Comment: socially             Review of Systems  Constitutional: negative  Eyes: negative  Ears, nose, mouth, throat, and face: negative  Respiratory: negative  Cardiovascular: negative  Gastrointestinal: negative  Genitourinary:negative  Integument/breast: negative  Hematologic/lymphatic: negative  Musculoskeletal:positive for back pain  Neurological: positive for rt sciatica  Behavioral/Psych: negative    Objective:     Visit Vitals  /80 (BP 1 Location: Left upper arm, BP Patient Position: Sitting, BP Cuff Size: Adult)   Pulse 84   Temp 98.2 °F (36.8 °C) (Oral)   Resp 18   Ht 5' 11\" (1.803 m)   Wt 245 lb 6.4 oz (111.3 kg)   SpO2 99%   BMI 34.23 kg/m²     Physical exam:   General appearance - alert, well appearing, and in no distress  Mental status - alert, oriented to person, place, and time  Eyes - pupils equal and reactive, extraocular eye movements intact  Ears - bilateral TM's and external ear canals normal  Nose - normal and patent, no erythema, discharge or polyps  Mouth - mucous membranes moist, pharynx normal without lesions  Neck - supple, no significant adenopathy  Chest - clear to auscultation, no wheezes, rales or rhonchi, symmetric air entry  Heart - normal rate, regular rhythm, normal S1, S2, no murmurs, rubs, clicks or gallops  Abdomen - soft, nontender, nondistended, no masses or organomegaly  Rectal - deferred, not clinically indicated  Back exam - full range of motion, no tenderness, palpable spasm or pain on motion  Neurological - alert, oriented, normal speech, no focal findings or movement disorder noted  Musculoskeletal - no joint tenderness, deformity or swelling  Extremities - peripheral pulses normal, no pedal edema, no clubbing or cyanosis     Assessment/Plan:       routine labs ordered, call if any problems. Diagnoses and all orders for this visit:    1. Type 2 diabetes mellitus with mild nonproliferative retinopathy without macular edema, with long-term current use of insulin, unspecified laterality ([facility]  -     LIPID PANEL; Future  -     AMB POC HEMOGLOBIN A1C  -     MICROALBUMIN, UR, RAND W/ MICROALB/CREAT RATIO; Future  -     metFORMIN ER (GLUCOPHAGE XR) 750 mg tablet; Take 1 Tablet by mouth daily. 2. Essential hypertension  -     METABOLIC PANEL, COMPREHENSIVE; Future  -     CBC WITH AUTOMATED DIFF; Future  -     lisinopriL (PRINIVIL, ZESTRIL) 20 mg tablet; Take 1 Tablet by mouth daily. 3. Nocturia  -     PSA, DIAGNOSTIC (PROSTATE SPECIFIC AG); Future    4. Screen for colon cancer    5. Type 2 diabetes with nephropathy (HCC)  -     lisinopriL (PRINIVIL, ZESTRIL) 20 mg tablet; Take 1 Tablet by mouth daily. Follow-up and Dispositions    · Return in about 6 months (around 10/13/2022).      Cruz Eugene

## 2022-04-13 NOTE — PROGRESS NOTES
Chief Complaint   Patient presents with    Diabetes     F/U on diabetes.  Hypertension     F/U on BP.  Cholesterol Problem     F/U on cholesterol. 1. \"Have you been to the ER, urgent care clinic since your last visit? Hospitalized since your last visit? \" No    2. \"Have you seen or consulted any other health care providers outside of the [address] [place] [address] since your last visit? \" No     3. For patients aged 39-70: Has the patient had a colonoscopy / FIT/ Cologuard? Yes - Care Gap present. Most recent result on file      If the patient is female:    4. For patients aged 41-77: Has the patient had a mammogram within the past 2 years? NA - based on age or sex      11. For patients aged 21-65: Has the patient had a pap smear?  NA - based on age or sex    Health Maintenance Due   Topic Date Due    Hepatitis C Screening  Never done    COVID-19 Vaccine (1) Never done    Pneumococcal 0-64 years (1 - PCV) Never done    Eye Exam Retinal or Dilated  Never done    Foot Exam Q1  07/23/2019    Shingrix Vaccine Age 50> (1 of 2) Never done    MICROALBUMIN Q1  11/20/2020    Lipid Screen  11/20/2020    Depression Screen  11/17/2021

## 2022-04-14 LAB
ALBUMIN SERPL-MCNC: 4.4 G/DL (ref 3.8–4.9)
ALBUMIN/CREAT UR: 23 MG/G CREAT (ref 0–29)
ALBUMIN/GLOB SERPL: 1.8 {RATIO} (ref 1.2–2.2)
ALP SERPL-CCNC: 117 IU/L (ref 44–121)
ALT SERPL-CCNC: 40 IU/L (ref 0–44)
AST SERPL-CCNC: 30 IU/L (ref 0–40)
BASOPHILS # BLD AUTO: 0 X10E3/UL (ref 0–0.2)
BASOPHILS NFR BLD AUTO: 1 %
BILIRUB SERPL-MCNC: 0.4 MG/DL (ref 0–1.2)
BUN SERPL-MCNC: 22 MG/DL (ref 6–24)
BUN/CREAT SERPL: 18 (ref 9–20)
CALCIUM SERPL-MCNC: 9.3 MG/DL (ref 8.7–10.2)
CHLORIDE SERPL-SCNC: 97 MMOL/L (ref 96–106)
CHOLEST SERPL-MCNC: 183 MG/DL (ref 100–199)
CO2 SERPL-SCNC: 19 MMOL/L (ref 20–29)
CREAT SERPL-MCNC: 1.22 MG/DL (ref 0.76–1.27)
CREAT UR-MCNC: 23.9 MG/DL
EGFR: 71 ML/MIN/1.73
EOSINOPHIL # BLD AUTO: 0.2 X10E3/UL (ref 0–0.4)
EOSINOPHIL NFR BLD AUTO: 4 %
ERYTHROCYTE [DISTWIDTH] IN BLOOD BY AUTOMATED COUNT: 12.4 % (ref 11.6–15.4)
GLOBULIN SER CALC-MCNC: 2.4 G/DL (ref 1.5–4.5)
GLUCOSE SERPL-MCNC: 245 MG/DL (ref 65–99)
HCT VFR BLD AUTO: 47.1 % (ref 37.5–51)
HDLC SERPL-MCNC: 46 MG/DL
HGB BLD-MCNC: 15.9 G/DL (ref 13–17.7)
IMM GRANULOCYTES # BLD AUTO: 0 X10E3/UL (ref 0–0.1)
IMM GRANULOCYTES NFR BLD AUTO: 0 %
IMP & REVIEW OF LAB RESULTS: NORMAL
LDLC SERPL CALC-MCNC: 108 MG/DL (ref 0–99)
LYMPHOCYTES # BLD AUTO: 1.6 X10E3/UL (ref 0.7–3.1)
LYMPHOCYTES NFR BLD AUTO: 26 %
MCH RBC QN AUTO: 30.6 PG (ref 26.6–33)
MCHC RBC AUTO-ENTMCNC: 33.8 G/DL (ref 31.5–35.7)
MCV RBC AUTO: 91 FL (ref 79–97)
MICROALBUMIN UR-MCNC: 5.4 UG/ML
MONOCYTES # BLD AUTO: 0.6 X10E3/UL (ref 0.1–0.9)
MONOCYTES NFR BLD AUTO: 9 %
NEUTROPHILS # BLD AUTO: 3.7 X10E3/UL (ref 1.4–7)
NEUTROPHILS NFR BLD AUTO: 60 %
PLATELET # BLD AUTO: 231 X10E3/UL (ref 150–450)
POTASSIUM SERPL-SCNC: 4.8 MMOL/L (ref 3.5–5.2)
PROT SERPL-MCNC: 6.8 G/DL (ref 6–8.5)
PSA SERPL-MCNC: 0.4 NG/ML (ref 0–4)
RBC # BLD AUTO: 5.19 X10E6/UL (ref 4.14–5.8)
SODIUM SERPL-SCNC: 136 MMOL/L (ref 134–144)
TRIGL SERPL-MCNC: 168 MG/DL (ref 0–149)
VLDLC SERPL CALC-MCNC: 29 MG/DL (ref 5–40)
WBC # BLD AUTO: 6.2 X10E3/UL (ref 3.4–10.8)

## 2022-04-21 ENCOUNTER — OFFICE VISIT (OUTPATIENT)
Dept: FAMILY MEDICINE CLINIC | Age: 52
End: 2022-04-21

## 2022-04-21 DIAGNOSIS — Z79.4 TYPE 2 DIABETES MELLITUS WITH BOTH EYES AFFECTED BY MILD NONPROLIFERATIVE RETINOPATHY WITHOUT MACULAR EDEMA, WITH LONG-TERM CURRENT USE OF INSULIN (HCC): Primary | ICD-10-CM

## 2022-04-21 DIAGNOSIS — E11.3293 TYPE 2 DIABETES MELLITUS WITH BOTH EYES AFFECTED BY MILD NONPROLIFERATIVE RETINOPATHY WITHOUT MACULAR EDEMA, WITH LONG-TERM CURRENT USE OF INSULIN (HCC): Primary | ICD-10-CM

## 2022-04-21 RX ORDER — SEMAGLUTIDE 1.34 MG/ML
0.5 INJECTION, SOLUTION SUBCUTANEOUS
Qty: 3 BOX | Refills: 1 | Status: SHIPPED | OUTPATIENT
Start: 2022-04-21

## 2022-04-21 RX ORDER — SEMAGLUTIDE 1.34 MG/ML
INJECTION, SOLUTION SUBCUTANEOUS
Qty: 1 BOX | Refills: 0 | Status: SHIPPED | COMMUNITY
Start: 2022-04-21 | End: 2022-09-29

## 2022-04-21 NOTE — PROGRESS NOTES
Pt referred by Dr. Ryan Dupree for diabetes management, A1c on 4/13 of 7.2% on metformin and 70/30 insulin. Pt presents feeling well. States he's here to talk about new medication classes out there that may help him. He's working out daily at lunch for 30 minutes and has been doing that for about a year; has not seen the effects he wants with weight loss. Has also cut out sweets, lower carb diet and more protein and healthier fats. He's taking metformin 750 mg daily; he gets the insulin from [employer] and buys out of pocket. He uses it based on his sugars, which he checks twice daily. Sometimes he uses it twice daily, sometimes once daily, and sometimes not at all. If had to give an average he uses daily would be between 10 and 15 units. States his sugars are in the 100s, denies low sugars. Reviewed pt's labs with him; discussed lisinpril and rationale for use and his improved microalbumin: creatine ratio. Discussed statin and rationale for therapy in patients with diabetes. Discussed GLP1 agonists in detail with pt; this is the class he's interested in trying. Sounds like ozempic is on his formulary. He hasn't used a pen for his insulin before but is familiar with injecting given his insulin use. Provided pt with sample ozempic pen to start today and sent order to his pharmacy with activated copay card information to use if copay is above $25/ month. Gave pt card to take if needed. Reviewed Ozempic in detail, how to inject, store, attach pen needle and dispose of pen needle, use on needle per injection , injection sites, rotation of sites, once weekly - pick day - what to do if miss day, potential side effects and what to do, holding pen in site after injecting to make sure medication is fully injected, expected effects, dose titration, amount of medication in pen, how the medication works, documenting doses given on ozempic box.  Patient able to demonstrate on sample pen appropriate use.    Discussed with pt may be able to decrease dose of insulin and possibly discontinue if able to tolerate ozempic dose titration. Will call pharmacy to check on price and let pt know later today. Follow up with PCP as planned and with me as desired; gave my contact information. Patient verbalized understanding of information presented. Answered all of the patient's questions. Christiano Mcmanus, PHARMD, [facility] [address] in place: Yes   Recommendation Provided To: Patient/Caregiver: 3 via In person   Intervention Detail: Device Training, New Rx: 1, reason: Needs Additional Therapy and Patient Access Assistance/Sample Provided  Intervention Accepted By: Patient/Caregiver: 3   Time Spent (min): 60      Addendum:  Called pharmacy and ozempic cost for one pen is $24.99 - informed pt. Mail order may be a cost savings for 3 months but not sure; he will let us know if this is the case and he needs an [facility]. For now will use sample and  pen at pharmacy when needed.     Rick Art Sender

## 2022-04-22 RX ORDER — SODIUM PICOSULFATE, MAGNESIUM OXIDE, AND ANHYDROUS CITRIC ACID 10; 3.5; 12 MG/160ML; G/160ML; G/160ML
LIQUID ORAL
COMMUNITY
Start: 2022-04-06 | End: 2022-04-22 | Stop reason: ALTCHOICE

## 2022-09-29 ENCOUNTER — TELEPHONE (OUTPATIENT)
Dept: FAMILY MEDICINE CLINIC | Age: 52
End: 2022-09-29

## 2022-09-29 DIAGNOSIS — Z79.4 TYPE 2 DIABETES MELLITUS WITH BOTH EYES AFFECTED BY MILD NONPROLIFERATIVE RETINOPATHY WITHOUT MACULAR EDEMA, WITH LONG-TERM CURRENT USE OF INSULIN (HCC): ICD-10-CM

## 2022-09-29 DIAGNOSIS — E11.3293 TYPE 2 DIABETES MELLITUS WITH BOTH EYES AFFECTED BY MILD NONPROLIFERATIVE RETINOPATHY WITHOUT MACULAR EDEMA, WITH LONG-TERM CURRENT USE OF INSULIN (HCC): ICD-10-CM

## 2022-09-29 RX ORDER — SEMAGLUTIDE 1.34 MG/ML
INJECTION, SOLUTION SUBCUTANEOUS
Qty: 1 BOX | Refills: 0 | Status: SHIPPED | COMMUNITY
Start: 2022-09-29 | End: 2022-10-13 | Stop reason: SDUPTHER

## 2022-09-29 NOTE — TELEPHONE ENCOUNTER
Spouse called about ozempic acess. Pt has been out of the medication for a while.   Issues with PA  Called pharmacy and tried the PA in cover my meds - stated needed to  call insurance plan  Pt getting treatment for mouth cancer; probably 214 pounds now   Had surgery last week  Logging sugars   Had a hypoglycemic episode in July and sugars are \"up and down\" go low and then high  Using 70/30 insulin \"as needed\" when sugars are high - not taking in regarding to food or in relationship to how much food will be eaten  Also taking Metformin  Discussed ozempic reducing appetite and need to watch this; if causes any issues consider stoppping  Pt will come get sample and bring BG log for review  Consider  out insulin and giving a low basal dose daily, and separate prandial insulin dose for meals / elevated sugars  Will call insurance regarding ozempic   Have to complete paper PA and fax in for pt's plan:     [email or web address]      Discussed with Dr. Rosibel Gonzales, PHARMD, [address] [place] [address] in place: Yes  Recommendation Provided To: Patient/Caregiver: 3 via In person  Intervention Detail: Patient Access Assistance/Sample Provided  Intervention Accepted By: Patient/Caregiver: 3  Time Spent (min): 30

## 2022-10-13 ENCOUNTER — TELEPHONE (OUTPATIENT)
Dept: FAMILY MEDICINE CLINIC | Age: 52
End: 2022-10-13

## 2022-10-13 ENCOUNTER — OFFICE VISIT (OUTPATIENT)
Dept: FAMILY MEDICINE CLINIC | Age: 52
End: 2022-10-13
Payer: COMMERCIAL

## 2022-10-13 VITALS
RESPIRATION RATE: 18 BRPM | HEART RATE: 84 BPM | BODY MASS INDEX: 30.32 KG/M2 | TEMPERATURE: 97.5 F | OXYGEN SATURATION: 98 % | HEIGHT: 71 IN | DIASTOLIC BLOOD PRESSURE: 77 MMHG | SYSTOLIC BLOOD PRESSURE: 115 MMHG | WEIGHT: 216.6 LBS

## 2022-10-13 DIAGNOSIS — I10 ESSENTIAL HYPERTENSION: ICD-10-CM

## 2022-10-13 DIAGNOSIS — C06.1: Primary | ICD-10-CM

## 2022-10-13 DIAGNOSIS — Z79.4 TYPE 2 DIABETES MELLITUS WITH BOTH EYES AFFECTED BY MILD NONPROLIFERATIVE RETINOPATHY WITHOUT MACULAR EDEMA, WITH LONG-TERM CURRENT USE OF INSULIN (HCC): ICD-10-CM

## 2022-10-13 DIAGNOSIS — E11.3293 TYPE 2 DIABETES MELLITUS WITH BOTH EYES AFFECTED BY MILD NONPROLIFERATIVE RETINOPATHY WITHOUT MACULAR EDEMA, WITH LONG-TERM CURRENT USE OF INSULIN (HCC): ICD-10-CM

## 2022-10-13 LAB — HBA1C MFR BLD HPLC: 7 %

## 2022-10-13 PROCEDURE — 3051F HG A1C>EQUAL 7.0%<8.0%: CPT | Performed by: FAMILY MEDICINE

## 2022-10-13 PROCEDURE — 83036 HEMOGLOBIN GLYCOSYLATED A1C: CPT | Performed by: FAMILY MEDICINE

## 2022-10-13 PROCEDURE — 99214 OFFICE O/P EST MOD 30 MIN: CPT | Performed by: FAMILY MEDICINE

## 2022-10-13 RX ORDER — SEMAGLUTIDE 1.34 MG/ML
INJECTION, SOLUTION SUBCUTANEOUS
Qty: 1 BOX | Refills: 0 | Status: SHIPPED | OUTPATIENT
Start: 2022-10-13

## 2022-10-13 RX ORDER — ACETAMINOPHEN 325 MG/1
TABLET ORAL
COMMUNITY
Start: 2022-09-22

## 2022-10-13 NOTE — PROGRESS NOTES
HISTORY OF PRESENT ILLNESS  Marianne Hutson is a 47 y.o. male. being treated at [facility] for oral cancer s/p local and neck node dissection. Feeling well,has lost wt  and has good sugar control. Currently working fulltime  Hospital Follow Up  The history is provided by the Patient. This is a new problem. The current episode started more than 1 week ago. The problem occurs daily. Pertinent negatives include no chest pain, no abdominal pain and no shortness of breath. Diabetes  The history is provided by the Patient. This is a chronic problem. The problem occurs daily. The problem has been gradually improving. Pertinent negatives include no chest pain, no abdominal pain and no shortness of breath. Review of Systems   Constitutional:  Negative for malaise/fatigue. Respiratory:  Negative for shortness of breath. Cardiovascular:  Negative for chest pain. Gastrointestinal:  Negative for abdominal pain, constipation and heartburn. Physical Exam  Constitutional:       Appearance: Normal appearance. HENT:      Head: Normocephalic and atraumatic. Right Ear: Tympanic membrane normal.      Left Ear: Tympanic membrane normal.      Nose: Nose normal.   Eyes:      Extraocular Movements: Extraocular movements intact. Pupils: Pupils are equal, round, and reactive to light. Neck:      Comments: Healing rt neck incision  Cardiovascular:      Rate and Rhythm: Normal rate and regular rhythm. Pulses: Normal pulses. Heart sounds: Normal heart sounds. Pulmonary:      Effort: Pulmonary effort is normal.      Breath sounds: Normal breath sounds. Musculoskeletal:      Cervical back: Neck supple. Lymphadenopathy:      Cervical: No cervical adenopathy. Neurological:      Mental Status: He is alert. ASSESSMENT and PLAN  Diagnoses and all orders for this visit:    1. Malignant neoplasm vestibule of mouth (HCC),s/p resection    2.  Type 2 diabetes mellitus with both eyes affected by mild nonproliferative retinopathy without macular edema, with long-term current use of insulin (HCC) much improved. Continue current meds and treatments. -     AMB POC HEMOGLOBIN A1C  -     semaglutide (Ozempic) 0.25 mg or 0.5 mg/dose (2 mg/1.5 ml) subq pen; Inject 0.25 mg every 7 days for 4 weeks, then increase to 0.5 mg every 7 days. Sample pen to re start medication.     3. Essential hypertension

## 2022-10-13 NOTE — PROGRESS NOTES
Patient identified with two identification factors, Name and Date of Birth. Chief Complaint   Patient presents with    Hospital Follow Up     Pt state mouth surgery was done 09/21/22 cancer present on right side of mouth. Visit Vitals  /77 (BP 1 Location: Left arm, BP Patient Position: Sitting, BP Cuff Size: Adult)   Pulse 84   Temp 97.5 °F (36.4 °C) (Oral)   Resp 18   Ht 5' 11\" (1.803 m)   Wt 216 lb 9.6 oz (98.2 kg)   SpO2 98%   BMI 30.21 kg/m²       Health Maintenance Due   Topic    Hepatitis C Screening     COVID-19 Vaccine (1)    Pneumococcal 0-64 years (1 - PCV)    Eye Exam Retinal or Dilated     Hepatitis B Vaccine (1 of 3 - Risk 3-dose series)    Foot Exam Q1     Shingrix Vaccine Age 50> (1 of 2)    Flu Vaccine (1)        1. \"Have you been to the ER, urgent care clinic since your last visit? Hospitalized since your last visit? \" No    2. \"Have you seen or consulted any other health care providers outside of the [address] [place] [address] since your last visit? \" No     3. For patients aged 39-70: Has the patient had a colonoscopy / FIT/ Cologuard?  Yes - no Care Gap present

## 2022-12-28 ENCOUNTER — DOCUMENTATION ONLY (OUTPATIENT)
Dept: FAMILY MEDICINE CLINIC | Age: 52
End: 2022-12-28

## 2022-12-28 RX ORDER — SEMAGLUTIDE 1.34 MG/ML
0.5 INJECTION, SOLUTION SUBCUTANEOUS
Qty: 3 BOX | Refills: 3 | Status: SHIPPED | OUTPATIENT
Start: 2022-12-28

## 2023-01-13 ENCOUNTER — OFFICE VISIT (OUTPATIENT)
Dept: FAMILY MEDICINE CLINIC | Age: 53
End: 2023-01-13
Payer: COMMERCIAL

## 2023-01-13 VITALS
HEART RATE: 95 BPM | BODY MASS INDEX: 26.57 KG/M2 | DIASTOLIC BLOOD PRESSURE: 77 MMHG | SYSTOLIC BLOOD PRESSURE: 114 MMHG | HEIGHT: 71 IN | WEIGHT: 189.8 LBS | OXYGEN SATURATION: 97 %

## 2023-01-13 DIAGNOSIS — I10 ESSENTIAL HYPERTENSION: ICD-10-CM

## 2023-01-13 DIAGNOSIS — Z92.3 S/P RADIATION THERAPY: ICD-10-CM

## 2023-01-13 DIAGNOSIS — Z79.4 TYPE 2 DIABETES MELLITUS WITH BOTH EYES AFFECTED BY MILD NONPROLIFERATIVE RETINOPATHY WITHOUT MACULAR EDEMA, WITH LONG-TERM CURRENT USE OF INSULIN (HCC): ICD-10-CM

## 2023-01-13 DIAGNOSIS — K21.00 GASTROESOPHAGEAL REFLUX DISEASE WITH ESOPHAGITIS WITHOUT HEMORRHAGE: ICD-10-CM

## 2023-01-13 DIAGNOSIS — E13.319 RETINOPATHY DUE TO SECONDARY DIABETES (HCC): ICD-10-CM

## 2023-01-13 DIAGNOSIS — E78.2 MIXED HYPERLIPIDEMIA: ICD-10-CM

## 2023-01-13 DIAGNOSIS — E11.3293 TYPE 2 DIABETES MELLITUS WITH BOTH EYES AFFECTED BY MILD NONPROLIFERATIVE RETINOPATHY WITHOUT MACULAR EDEMA, WITH LONG-TERM CURRENT USE OF INSULIN (HCC): ICD-10-CM

## 2023-01-13 DIAGNOSIS — C06.1: Primary | ICD-10-CM

## 2023-01-13 DIAGNOSIS — R13.12 OROPHARYNGEAL DYSPHAGIA: ICD-10-CM

## 2023-01-13 LAB — HBA1C MFR BLD HPLC: 7.1 %

## 2023-01-13 NOTE — PROGRESS NOTES
HISTORY OF PRESENT ILLNESS  Cherri Tuttle is a 47 y.o. male. being treated at [facility] for oral cancer s/p local and neck node dissection. Has completeed RT ,having postRT dysphagia,pain,wt loss. Has stopped ozempic,lisiopril,metformin. Sugars remain well controlled;has lost wt   Hospital Follow Up  The history is provided by the Patient. This is a new problem. The current episode started more than 1 week ago. The problem occurs daily. Pertinent negatives include no chest pain, no abdominal pain and no shortness of breath. Diabetes  The history is provided by the Patient. This is a chronic problem. The problem occurs daily. The problem has been rapidly improving. Pertinent negatives include no chest pain, no abdominal pain and no shortness of breath. Follow-up  The history is provided by the Patient. This is a chronic problem. The problem occurs daily. Pertinent negatives include no chest pain, no abdominal pain and no shortness of breath. Weight Loss  The history is provided by the Patient. This is a new problem. The current episode started more than 1 week ago. The problem has not changed since onset. Pertinent negatives include no chest pain, no abdominal pain and no shortness of breath. Review of Systems   Constitutional:  Positive for weight loss. Negative for fever and malaise/fatigue. Respiratory:  Negative for shortness of breath. Cardiovascular:  Negative for chest pain. Gastrointestinal:  Negative for abdominal pain, constipation, heartburn and nausea. Musculoskeletal:  Negative for myalgias. Neurological:  Positive for dizziness. Physical Exam  Constitutional:       Appearance: Normal appearance. HENT:      Head: Normocephalic and atraumatic. Right Ear: Tympanic membrane normal.      Left Ear: Tympanic membrane normal.      Nose: Nose normal.      Mouth/Throat:      Mouth: Mucous membranes are dry.       Comments: Tongue resection ,noted ,well healed  Eyes:      Extraocular Movements: Extraocular movements intact. Pupils: Pupils are equal, round, and reactive to light. Neck:      Comments: Healing rt neck incision  Cardiovascular:      Rate and Rhythm: Normal rate and regular rhythm. Pulses: Normal pulses. Heart sounds: Normal heart sounds. Pulmonary:      Effort: Pulmonary effort is normal.      Breath sounds: Normal breath sounds. Musculoskeletal:      Cervical back: Neck supple. No tenderness. Lymphadenopathy:      Cervical: No cervical adenopathy. Skin:     General: Skin is warm. Findings: Erythema present. Comments: Rt lateral neck erythemetous   Neurological:      Mental Status: He is alert. ASSESSMENT and PLAN  Diagnoses and all orders for this visit:    1. Malignant neoplasm vestibule of mouth (HCC),s/p resection    2. Type 2 diabetes mellitus with both eyes affected by mild nonproliferative retinopathy without macular edema, with long-term current use of insulin (HCC) much improved. Continue current meds and treatments. -     AMB POC HEMOGLOBIN A1C  -     semaglutide (Ozempic) 0.25 mg or 0.5 mg/dose (2 mg/1.5 ml) subq pen; Inject 0.25 mg every 7 days for 4 weeks, then increase to 0.5 mg every 7 days. Sample pen to re start medication.     3. Essential hypertension

## 2023-01-13 NOTE — PROGRESS NOTES
Chief Complaint   Patient presents with    Follow-up     3 month follow up - no concerns or complaints     1. \"Have you been to the ER, urgent care clinic since your last visit? Hospitalized since your last visit? \"  [facility] 12/28/22 for fluids for dehydration and  [facility] 1/4/23 due dehydration     2. \"Have you seen or consulted any other health care providers outside of the [address] [place] [address] since your last visit? \" No     3. For patients aged 39-70: Has the patient had a colonoscopy / FIT/ Cologuard? Yes - no Care Gap present      If the patient is female:    4. For patients aged 41-77: Has the patient had a mammogram within the past 2 years? NA - based on age or sex      11. For patients aged 21-65: Has the patient had a pap smear?  NA - based on age or sex

## 2023-01-14 LAB
ALBUMIN SERPL-MCNC: 3.8 G/DL (ref 3.8–4.9)
ALBUMIN/GLOB SERPL: 1.5 {RATIO} (ref 1.2–2.2)
ALP SERPL-CCNC: 107 IU/L (ref 44–121)
ALT SERPL-CCNC: 60 IU/L (ref 0–44)
AST SERPL-CCNC: 50 IU/L (ref 0–40)
BILIRUB SERPL-MCNC: 0.3 MG/DL (ref 0–1.2)
BUN SERPL-MCNC: 15 MG/DL (ref 6–24)
BUN/CREAT SERPL: 16 (ref 9–20)
CALCIUM SERPL-MCNC: 9.5 MG/DL (ref 8.7–10.2)
CHLORIDE SERPL-SCNC: 101 MMOL/L (ref 96–106)
CHOLEST SERPL-MCNC: 187 MG/DL (ref 100–199)
CO2 SERPL-SCNC: 26 MMOL/L (ref 20–29)
CREAT SERPL-MCNC: 0.96 MG/DL (ref 0.76–1.27)
EGFR: 95 ML/MIN/1.73
GLOBULIN SER CALC-MCNC: 2.6 G/DL (ref 1.5–4.5)
GLUCOSE SERPL-MCNC: 118 MG/DL (ref 70–99)
HDLC SERPL-MCNC: 44 MG/DL
IMP & REVIEW OF LAB RESULTS: NORMAL
LDLC SERPL CALC-MCNC: 115 MG/DL (ref 0–99)
POTASSIUM SERPL-SCNC: 4.4 MMOL/L (ref 3.5–5.2)
PROT SERPL-MCNC: 6.4 G/DL (ref 6–8.5)
SODIUM SERPL-SCNC: 143 MMOL/L (ref 134–144)
TRIGL SERPL-MCNC: 160 MG/DL (ref 0–149)
VLDLC SERPL CALC-MCNC: 28 MG/DL (ref 5–40)

## 2023-01-20 DIAGNOSIS — C06.1: ICD-10-CM

## 2023-01-20 DIAGNOSIS — L03.211 FACIAL CELLULITIS: Primary | ICD-10-CM

## 2023-01-20 RX ORDER — AMOXICILLIN AND CLAVULANATE POTASSIUM 875; 125 MG/1; MG/1
1 TABLET, FILM COATED ORAL 2 TIMES DAILY
Qty: 20 TABLET | Refills: 0 | Status: SHIPPED | OUTPATIENT
Start: 2023-01-20 | End: 2023-01-30

## 2023-04-21 ENCOUNTER — OFFICE VISIT (OUTPATIENT)
Dept: FAMILY MEDICINE CLINIC | Age: 53
End: 2023-04-21

## 2023-04-21 DIAGNOSIS — E87.5 HYPERKALEMIA: Primary | ICD-10-CM

## 2023-04-22 LAB
BUN SERPL-MCNC: 16 MG/DL (ref 6–24)
BUN/CREAT SERPL: 16 (ref 9–20)
CALCIUM SERPL-MCNC: 9.8 MG/DL (ref 8.7–10.2)
CHLORIDE SERPL-SCNC: 97 MMOL/L (ref 96–106)
CO2 SERPL-SCNC: 25 MMOL/L (ref 20–29)
CREAT SERPL-MCNC: 1.02 MG/DL (ref 0.76–1.27)
EGFRCR SERPLBLD CKD-EPI 2021: 88 ML/MIN/1.73
GLUCOSE SERPL-MCNC: 163 MG/DL (ref 70–99)
POTASSIUM SERPL-SCNC: 4.7 MMOL/L (ref 3.5–5.2)
SODIUM SERPL-SCNC: 141 MMOL/L (ref 134–144)

## 2023-07-17 ENCOUNTER — OFFICE VISIT (OUTPATIENT)
Age: 53
End: 2023-07-17
Payer: COMMERCIAL

## 2023-07-17 VITALS
HEIGHT: 71 IN | TEMPERATURE: 97.6 F | RESPIRATION RATE: 18 BRPM | HEART RATE: 67 BPM | WEIGHT: 205.8 LBS | BODY MASS INDEX: 28.81 KG/M2 | DIASTOLIC BLOOD PRESSURE: 71 MMHG | OXYGEN SATURATION: 99 % | SYSTOLIC BLOOD PRESSURE: 116 MMHG

## 2023-07-17 DIAGNOSIS — R13.12 DYSPHAGIA, OROPHARYNGEAL PHASE: ICD-10-CM

## 2023-07-17 DIAGNOSIS — C06.1: ICD-10-CM

## 2023-07-17 DIAGNOSIS — M65.331 TRIGGER MIDDLE FINGER OF RIGHT HAND: ICD-10-CM

## 2023-07-17 DIAGNOSIS — I10 ESSENTIAL (PRIMARY) HYPERTENSION: ICD-10-CM

## 2023-07-17 DIAGNOSIS — Z79.4 TYPE 2 DIABETES MELLITUS WITH BOTH EYES AFFECTED BY MILD NONPROLIFERATIVE RETINOPATHY WITHOUT MACULAR EDEMA, WITH LONG-TERM CURRENT USE OF INSULIN (HCC): Primary | ICD-10-CM

## 2023-07-17 DIAGNOSIS — E11.3293 TYPE 2 DIABETES MELLITUS WITH BOTH EYES AFFECTED BY MILD NONPROLIFERATIVE RETINOPATHY WITHOUT MACULAR EDEMA, WITH LONG-TERM CURRENT USE OF INSULIN (HCC): Primary | ICD-10-CM

## 2023-07-17 DIAGNOSIS — E78.2 MIXED HYPERLIPIDEMIA: ICD-10-CM

## 2023-07-17 DIAGNOSIS — Z79.4 LONG TERM (CURRENT) USE OF INSULIN (HCC): ICD-10-CM

## 2023-07-17 LAB — HBA1C MFR BLD: 7 %

## 2023-07-17 PROCEDURE — 3074F SYST BP LT 130 MM HG: CPT | Performed by: FAMILY MEDICINE

## 2023-07-17 PROCEDURE — 99213 OFFICE O/P EST LOW 20 MIN: CPT | Performed by: FAMILY MEDICINE

## 2023-07-17 PROCEDURE — 83036 HEMOGLOBIN GLYCOSYLATED A1C: CPT | Performed by: FAMILY MEDICINE

## 2023-07-17 PROCEDURE — 3078F DIAST BP <80 MM HG: CPT | Performed by: FAMILY MEDICINE

## 2023-07-17 RX ORDER — AMOXICILLIN AND CLAVULANATE POTASSIUM 875; 125 MG/1; MG/1
1 TABLET, FILM COATED ORAL 2 TIMES DAILY
COMMUNITY
Start: 2023-06-26

## 2023-07-17 RX ORDER — IBUPROFEN 800 MG/1
200 TABLET ORAL PRN
COMMUNITY

## 2023-07-17 RX ORDER — CHLORHEXIDINE GLUCONATE 0.12 MG/ML
RINSE ORAL
COMMUNITY
Start: 2023-05-23

## 2023-07-17 RX ORDER — BLOOD SUGAR DIAGNOSTIC
STRIP MISCELLANEOUS
COMMUNITY
Start: 2021-09-30

## 2023-07-17 RX ORDER — METHOCARBAMOL 500 MG/1
TABLET, FILM COATED ORAL
COMMUNITY
Start: 2023-05-23 | End: 2023-07-17 | Stop reason: CLARIF

## 2023-07-17 RX ORDER — OXYCODONE HYDROCHLORIDE 5 MG/1
TABLET ORAL
COMMUNITY
Start: 2023-05-21 | End: 2023-07-17 | Stop reason: CLARIF

## 2023-07-17 SDOH — ECONOMIC STABILITY: FOOD INSECURITY: WITHIN THE PAST 12 MONTHS, YOU WORRIED THAT YOUR FOOD WOULD RUN OUT BEFORE YOU GOT MONEY TO BUY MORE.: NEVER TRUE

## 2023-07-17 SDOH — ECONOMIC STABILITY: INCOME INSECURITY: HOW HARD IS IT FOR YOU TO PAY FOR THE VERY BASICS LIKE FOOD, HOUSING, MEDICAL CARE, AND HEATING?: SOMEWHAT HARD

## 2023-07-17 SDOH — ECONOMIC STABILITY: HOUSING INSECURITY
IN THE LAST 12 MONTHS, WAS THERE A TIME WHEN YOU DID NOT HAVE A STEADY PLACE TO SLEEP OR SLEPT IN A SHELTER (INCLUDING NOW)?: NO

## 2023-07-17 SDOH — ECONOMIC STABILITY: FOOD INSECURITY: WITHIN THE PAST 12 MONTHS, THE FOOD YOU BOUGHT JUST DIDN'T LAST AND YOU DIDN'T HAVE MONEY TO GET MORE.: NEVER TRUE

## 2023-07-17 ASSESSMENT — ENCOUNTER SYMPTOMS
BACK PAIN: 0
SHORTNESS OF BREATH: 0
CHEST TIGHTNESS: 0

## 2023-07-17 ASSESSMENT — PATIENT HEALTH QUESTIONNAIRE - PHQ9
SUM OF ALL RESPONSES TO PHQ QUESTIONS 1-9: 0
2. FEELING DOWN, DEPRESSED OR HOPELESS: 0
SUM OF ALL RESPONSES TO PHQ9 QUESTIONS 1 & 2: 0
SUM OF ALL RESPONSES TO PHQ QUESTIONS 1-9: 0
1. LITTLE INTEREST OR PLEASURE IN DOING THINGS: 0
SUM OF ALL RESPONSES TO PHQ QUESTIONS 1-9: 0
SUM OF ALL RESPONSES TO PHQ QUESTIONS 1-9: 0

## 2023-07-17 NOTE — PROGRESS NOTES
Subjective:      Patient ID: Telly Acosta is a 47 y.o. male. f/u dm2,hbp,cholglossal ca. Doing well,on ozempic and 70/30 mixed insulin bid. Feeling well    Diabetes  He presents for his follow-up diabetic visit. He has type 2 diabetes mellitus. The initial diagnosis of diabetes was made 5 years ago. His disease course has been stable. There are no hypoglycemic associated symptoms. Pertinent negatives for diabetes include no chest pain, no foot paresthesias, no polydipsia, no polyphagia and no polyuria. Risk factors for coronary artery disease include diabetes mellitus, dyslipidemia, hypertension and male sex. Hyperlipidemia  This is a chronic problem. The problem is controlled. Recent lipid tests were reviewed and are normal. He has no history of chronic renal disease, liver disease or nephrotic syndrome. Pertinent negatives include no chest pain or shortness of breath. Hypertension  This is a chronic problem. The problem is unchanged. The problem is controlled. Pertinent negatives include no chest pain, palpitations or shortness of breath. There is no history of chronic renal disease. Review of Systems   Constitutional:  Negative for activity change. HENT:  Negative for congestion and mouth sores. Respiratory:  Negative for chest tightness and shortness of breath. Cardiovascular:  Negative for chest pain, palpitations and leg swelling. Endocrine: Negative for polydipsia, polyphagia and polyuria. Musculoskeletal:  Negative for arthralgias, back pain and gait problem. Psychiatric/Behavioral:  Negative for agitation. Objective:   Physical Exam  Constitutional:       Appearance: Normal appearance. He is normal weight. HENT:      Right Ear: Tympanic membrane normal.      Left Ear: Tympanic membrane normal.      Nose: Nose normal.      Mouth/Throat:      Mouth: Mucous membranes are moist.   Eyes:      Pupils: Pupils are equal, round, and reactive to light.    Cardiovascular:      Rate

## 2023-07-17 NOTE — PROGRESS NOTES
Chief Complaint   Patient presents with    Follow-up     Patient is here today for a 3 month follow up. 1. Have you been to the ER, urgent care clinic since your last visit? Hospitalized since your last visit? No    2. Have you seen or consulted any other health care providers outside of the [address] [place] [address] since your last visit? Include any pap smears or colon screening.  Oncology at [facility]

## 2023-07-18 LAB
ALBUMIN SERPL-MCNC: 4.4 G/DL (ref 3.8–4.9)
ALBUMIN/GLOB SERPL: 2.1 {RATIO} (ref 1.2–2.2)
ALP SERPL-CCNC: 122 IU/L (ref 44–121)
ALT SERPL-CCNC: 70 IU/L (ref 0–44)
AST SERPL-CCNC: 45 IU/L (ref 0–40)
BILIRUB SERPL-MCNC: 0.3 MG/DL (ref 0–1.2)
BUN SERPL-MCNC: 13 MG/DL (ref 6–24)
BUN/CREAT SERPL: 13 (ref 9–20)
CALCIUM SERPL-MCNC: 9.1 MG/DL (ref 8.7–10.2)
CHLORIDE SERPL-SCNC: 96 MMOL/L (ref 96–106)
CHOLEST SERPL-MCNC: 195 MG/DL (ref 100–199)
CO2 SERPL-SCNC: 26 MMOL/L (ref 20–29)
CREAT SERPL-MCNC: 1.03 MG/DL (ref 0.76–1.27)
EGFRCR SERPLBLD CKD-EPI 2021: 87 ML/MIN/1.73
GLOBULIN SER CALC-MCNC: 2.1 G/DL (ref 1.5–4.5)
GLUCOSE SERPL-MCNC: 266 MG/DL (ref 70–99)
HDLC SERPL-MCNC: 63 MG/DL
LDLC SERPL CALC-MCNC: 115 MG/DL (ref 0–99)
POTASSIUM SERPL-SCNC: 4.9 MMOL/L (ref 3.5–5.2)
PROT SERPL-MCNC: 6.5 G/DL (ref 6–8.5)
SODIUM SERPL-SCNC: 135 MMOL/L (ref 134–144)
SPECIMEN STATUS REPORT: NORMAL
TRIGL SERPL-MCNC: 94 MG/DL (ref 0–149)
VLDLC SERPL CALC-MCNC: 17 MG/DL (ref 5–40)

## 2023-08-24 RX ORDER — LISINOPRIL 20 MG/1
TABLET ORAL
Qty: 90 TABLET | Refills: 3 | Status: SHIPPED | OUTPATIENT
Start: 2023-08-24

## 2023-08-24 NOTE — TELEPHONE ENCOUNTER
Last appointment: 7/17/23  Next appointment: 10/19/23  Previous refill encounter(s): 4/13/22 #90 with 3 refills    Requested Prescriptions     Pending Prescriptions Disp Refills    lisinopril (PRINIVIL;ZESTRIL) 20 MG tablet [Pharmacy Med Name: Lisinopril 20 MG Oral Tablet] 90 tablet 3     Sig: Take 1 tablet by mouth once daily         For Pharmacy Admin Tracking Only    Program: Medication Refill  CPA in place:    Recommendation Provided To:    Intervention Detail: New Rx: 1, reason: Patient Preference  Intervention Accepted By:   Negrita Valderrama Closed?:    Time Spent (min): 5

## 2023-10-19 ENCOUNTER — OFFICE VISIT (OUTPATIENT)
Age: 53
End: 2023-10-19
Payer: COMMERCIAL

## 2023-10-19 VITALS
WEIGHT: 215.8 LBS | RESPIRATION RATE: 18 BRPM | HEIGHT: 71 IN | OXYGEN SATURATION: 99 % | BODY MASS INDEX: 30.21 KG/M2 | DIASTOLIC BLOOD PRESSURE: 76 MMHG | HEART RATE: 74 BPM | SYSTOLIC BLOOD PRESSURE: 121 MMHG | TEMPERATURE: 97.3 F

## 2023-10-19 DIAGNOSIS — E78.2 MIXED HYPERLIPIDEMIA: ICD-10-CM

## 2023-10-19 DIAGNOSIS — C06.1: ICD-10-CM

## 2023-10-19 DIAGNOSIS — R79.89 ABNORMAL LFTS: ICD-10-CM

## 2023-10-19 DIAGNOSIS — E11.3293 TYPE 2 DIABETES MELLITUS WITH BOTH EYES AFFECTED BY MILD NONPROLIFERATIVE RETINOPATHY WITHOUT MACULAR EDEMA, WITH LONG-TERM CURRENT USE OF INSULIN (HCC): Primary | ICD-10-CM

## 2023-10-19 DIAGNOSIS — M65.331 TRIGGER MIDDLE FINGER OF RIGHT HAND: ICD-10-CM

## 2023-10-19 DIAGNOSIS — Z23 ENCOUNTER FOR IMMUNIZATION: ICD-10-CM

## 2023-10-19 DIAGNOSIS — I10 ESSENTIAL (PRIMARY) HYPERTENSION: ICD-10-CM

## 2023-10-19 DIAGNOSIS — Z79.4 LONG TERM (CURRENT) USE OF INSULIN (HCC): ICD-10-CM

## 2023-10-19 DIAGNOSIS — Z79.4 TYPE 2 DIABETES MELLITUS WITH BOTH EYES AFFECTED BY MILD NONPROLIFERATIVE RETINOPATHY WITHOUT MACULAR EDEMA, WITH LONG-TERM CURRENT USE OF INSULIN (HCC): Primary | ICD-10-CM

## 2023-10-19 LAB — HBA1C MFR BLD: 6.8 %

## 2023-10-19 PROCEDURE — 3074F SYST BP LT 130 MM HG: CPT | Performed by: FAMILY MEDICINE

## 2023-10-19 PROCEDURE — 83036 HEMOGLOBIN GLYCOSYLATED A1C: CPT | Performed by: FAMILY MEDICINE

## 2023-10-19 PROCEDURE — 3078F DIAST BP <80 MM HG: CPT | Performed by: FAMILY MEDICINE

## 2023-10-19 PROCEDURE — 99214 OFFICE O/P EST MOD 30 MIN: CPT | Performed by: FAMILY MEDICINE

## 2023-10-19 ASSESSMENT — PATIENT HEALTH QUESTIONNAIRE - PHQ9
SUM OF ALL RESPONSES TO PHQ9 QUESTIONS 1 & 2: 0
SUM OF ALL RESPONSES TO PHQ QUESTIONS 1-9: 0
2. FEELING DOWN, DEPRESSED OR HOPELESS: 0
1. LITTLE INTEREST OR PLEASURE IN DOING THINGS: 0
SUM OF ALL RESPONSES TO PHQ QUESTIONS 1-9: 0

## 2023-10-19 ASSESSMENT — ENCOUNTER SYMPTOMS
WHEEZING: 0
CHEST TIGHTNESS: 0

## 2023-10-19 NOTE — PROGRESS NOTES
Chief Complaint   Patient presents with    Follow-up     Patient is here today for a 3 month follow up. 1. Have you been to the ER, urgent care clinic since your last visit? Hospitalized since your last visit? No    2. Have you seen or consulted any other health care providers outside of the [address] [place] [address] since your last visit? Include any pap smears or colon screening.  No
supple. Skin:     General: Skin is warm and dry. Neurological:      General: No focal deficit present. Mental Status: He is alert and oriented to person, place, and time. Eloisa Loyd was seen today for follow-up. Diagnoses and all orders for this visit:    Type 2 diabetes mellitus with both eyes affected by mild nonproliferative retinopathy without macular edema, with long-term current use of insulin (HCC),well controlled  -     AMB POC HEMOGLOBIN A1C    Long term (current) use of insulin (HCC)    Essential (primary) hypertension,controlled  -     Cancel: CBC with Auto Differential; Future  -     Cancel: Comprehensive Metabolic Panel; Future  -     Comprehensive Metabolic Panel;  Future  -     CBC with Auto Differential; Future  -     CBC with Auto Differential  -     Comprehensive Metabolic Panel    Mixed hyperlipidemia  -     Cancel: Cholesterol, Total; Future  -     Cholesterol, Total; Future  -     Cholesterol, Total    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

## 2023-10-20 LAB
ALBUMIN SERPL-MCNC: 4.7 G/DL (ref 3.8–4.9)
ALBUMIN/GLOB SERPL: 2.2 {RATIO} (ref 1.2–2.2)
ALP SERPL-CCNC: 140 IU/L (ref 44–121)
ALT SERPL-CCNC: 41 IU/L (ref 0–44)
AST SERPL-CCNC: 43 IU/L (ref 0–40)
BASOPHILS # BLD AUTO: 0 X10E3/UL (ref 0–0.2)
BASOPHILS NFR BLD AUTO: 0 %
BILIRUB SERPL-MCNC: 0.6 MG/DL (ref 0–1.2)
BUN SERPL-MCNC: 19 MG/DL (ref 6–24)
BUN/CREAT SERPL: 16 (ref 9–20)
CALCIUM SERPL-MCNC: 9.7 MG/DL (ref 8.7–10.2)
CHLORIDE SERPL-SCNC: 95 MMOL/L (ref 96–106)
CHOLEST SERPL-MCNC: 192 MG/DL (ref 100–199)
CO2 SERPL-SCNC: 25 MMOL/L (ref 20–29)
CREAT SERPL-MCNC: 1.22 MG/DL (ref 0.76–1.27)
EGFRCR SERPLBLD CKD-EPI 2021: 70 ML/MIN/1.73
EOSINOPHIL # BLD AUTO: 0.2 X10E3/UL (ref 0–0.4)
EOSINOPHIL NFR BLD AUTO: 3 %
ERYTHROCYTE [DISTWIDTH] IN BLOOD BY AUTOMATED COUNT: 12.7 % (ref 11.6–15.4)
GLOBULIN SER CALC-MCNC: 2.1 G/DL (ref 1.5–4.5)
GLUCOSE SERPL-MCNC: 211 MG/DL (ref 70–99)
HCT VFR BLD AUTO: 46 % (ref 37.5–51)
HGB BLD-MCNC: 15.6 G/DL (ref 13–17.7)
IMM GRANULOCYTES # BLD AUTO: 0 X10E3/UL (ref 0–0.1)
IMM GRANULOCYTES NFR BLD AUTO: 0 %
LYMPHOCYTES # BLD AUTO: 1.2 X10E3/UL (ref 0.7–3.1)
LYMPHOCYTES NFR BLD AUTO: 23 %
MCH RBC QN AUTO: 30.7 PG (ref 26.6–33)
MCHC RBC AUTO-ENTMCNC: 33.9 G/DL (ref 31.5–35.7)
MCV RBC AUTO: 91 FL (ref 79–97)
MONOCYTES # BLD AUTO: 0.8 X10E3/UL (ref 0.1–0.9)
MONOCYTES NFR BLD AUTO: 15 %
NEUTROPHILS # BLD AUTO: 3 X10E3/UL (ref 1.4–7)
NEUTROPHILS NFR BLD AUTO: 59 %
PLATELET # BLD AUTO: 218 X10E3/UL (ref 150–450)
POTASSIUM SERPL-SCNC: 4.4 MMOL/L (ref 3.5–5.2)
PROT SERPL-MCNC: 6.8 G/DL (ref 6–8.5)
RBC # BLD AUTO: 5.08 X10E6/UL (ref 4.14–5.8)
SODIUM SERPL-SCNC: 135 MMOL/L (ref 134–144)
WBC # BLD AUTO: 5.1 X10E3/UL (ref 3.4–10.8)

## 2023-10-31 ENCOUNTER — CLINICAL DOCUMENTATION (OUTPATIENT)
Age: 53
End: 2023-10-31

## 2023-11-28 RX ORDER — SEMAGLUTIDE 0.68 MG/ML
0.5 INJECTION, SOLUTION SUBCUTANEOUS
Qty: 9 ML | Refills: 3 | Status: SHIPPED | OUTPATIENT
Start: 2023-11-28

## 2023-11-28 NOTE — TELEPHONE ENCOUNTER
Pharmacy is requesting a new Rx for the 3ml package size since the 1.5ml was d/c. New Rx has been pended. Last appointment: 10/19/23  Next appointment: 2/19/24  Previous refill encounter(s): 12/28/22    Requested Prescriptions     Pending Prescriptions Disp Refills    Semaglutide,0.25 or 0.5MG/DOS, (OZEMPIC, 0.25 OR 0.5 MG/DOSE,) 2 MG/3ML SOPN 9 mL 3     Sig: Inject 0.5 mg into the skin every 7 days         For Pharmacy Admin Tracking Only    Program: Medication Refill  CPA in place:    Recommendation Provided To:    Intervention Detail: New Rx: 1, reason: Patient Preference  Intervention Accepted By:   Marcos Jordan?:    Time Spent (min): 5

## 2024-02-19 ENCOUNTER — OFFICE VISIT (OUTPATIENT)
Age: 54
End: 2024-02-19
Payer: COMMERCIAL

## 2024-02-19 VITALS
OXYGEN SATURATION: 98 % | TEMPERATURE: 98.1 F | DIASTOLIC BLOOD PRESSURE: 68 MMHG | BODY MASS INDEX: 29.37 KG/M2 | WEIGHT: 209.8 LBS | SYSTOLIC BLOOD PRESSURE: 110 MMHG | HEIGHT: 71 IN | HEART RATE: 89 BPM | RESPIRATION RATE: 18 BRPM

## 2024-02-19 DIAGNOSIS — Z79.4 LONG TERM (CURRENT) USE OF INSULIN (HCC): ICD-10-CM

## 2024-02-19 DIAGNOSIS — E16.2 HYPOGLYCEMIA: ICD-10-CM

## 2024-02-19 DIAGNOSIS — E78.2 MIXED HYPERLIPIDEMIA: ICD-10-CM
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
## 2024-02-19 DIAGNOSIS — C06.1: ICD-10-CM

## 2024-02-19 DIAGNOSIS — I10 ESSENTIAL (PRIMARY) HYPERTENSION: ICD-10-CM

## 2024-02-19 DIAGNOSIS — Z79.4 TYPE 2 DIABETES MELLITUS WITH BOTH EYES AFFECTED BY MILD NONPROLIFERATIVE RETINOPATHY WITHOUT MACULAR EDEMA, WITH LONG-TERM CURRENT USE OF INSULIN (HCC): Primary | ICD-10-CM

## 2024-02-19 DIAGNOSIS — E11.3293 TYPE 2 DIABETES MELLITUS WITH BOTH EYES AFFECTED BY MILD NONPROLIFERATIVE RETINOPATHY WITHOUT MACULAR EDEMA, WITH LONG-TERM CURRENT USE OF INSULIN (HCC): Primary | ICD-10-CM

## 2024-02-19 LAB
GLUCOSE, POC: 210 MG/DL
HBA1C MFR BLD: 7.2 %

## 2024-02-19 PROCEDURE — 83036 HEMOGLOBIN GLYCOSYLATED A1C: CPT | Performed by: FAMILY MEDICINE

## 2024-02-19 PROCEDURE — 3078F DIAST BP <80 MM HG: CPT | Performed by: FAMILY MEDICINE

## 2024-02-19 PROCEDURE — 99214 OFFICE O/P EST MOD 30 MIN: CPT | Performed by: FAMILY MEDICINE

## 2024-02-19 PROCEDURE — 3074F SYST BP LT 130 MM HG: CPT | Performed by: FAMILY MEDICINE

## 2024-02-19 PROCEDURE — 82947 ASSAY GLUCOSE BLOOD QUANT: CPT | Performed by: FAMILY MEDICINE

## 2024-02-19 ASSESSMENT — PATIENT HEALTH QUESTIONNAIRE - PHQ9
SUM OF ALL RESPONSES TO PHQ QUESTIONS 1-9: 0
2. FEELING DOWN, DEPRESSED OR HOPELESS: 0
SUM OF ALL RESPONSES TO PHQ QUESTIONS 1-9: 0
1. LITTLE INTEREST OR PLEASURE IN DOING THINGS: 0
SUM OF ALL RESPONSES TO PHQ9 QUESTIONS 1 & 2: 0

## 2024-02-19 ASSESSMENT — ENCOUNTER SYMPTOMS
CHEST TIGHTNESS: 0
ABDOMINAL PAIN: 0
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
## 2024-03-22 ENCOUNTER — OFFICE VISIT (OUTPATIENT)
Age: 54
End: 2024-03-22

## 2024-03-22 VITALS
HEIGHT: 71 IN | TEMPERATURE: 98.4 F | RESPIRATION RATE: 18 BRPM | SYSTOLIC BLOOD PRESSURE: 104 MMHG | DIASTOLIC BLOOD PRESSURE: 68 MMHG | OXYGEN SATURATION: 97 % | HEART RATE: 88 BPM | WEIGHT: 212.2 LBS | BODY MASS INDEX: 29.71 KG/M2

## 2024-03-22 DIAGNOSIS — Z79.4 TYPE 2 DIABETES MELLITUS WITH BOTH EYES AFFECTED BY MILD NONPROLIFERATIVE RETINOPATHY WITHOUT MACULAR EDEMA, WITH LONG-TERM CURRENT USE OF INSULIN (HCC): ICD-10-CM

## 2024-03-22 DIAGNOSIS — E16.2 HYPOGLYCEMIA: ICD-10-CM

## 2024-03-22 DIAGNOSIS — E11.3293 TYPE 2 DIABETES MELLITUS WITH BOTH EYES AFFECTED BY MILD NONPROLIFERATIVE RETINOPATHY WITHOUT MACULAR EDEMA, WITH LONG-TERM CURRENT USE OF INSULIN (HCC): ICD-10-CM
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
## 2024-03-22 DIAGNOSIS — Z79.4 LONG TERM (CURRENT) USE OF INSULIN (HCC): ICD-10-CM

## 2024-03-22 DIAGNOSIS — R53.83 OTHER FATIGUE: ICD-10-CM

## 2024-03-22 DIAGNOSIS — I10 ESSENTIAL (PRIMARY) HYPERTENSION: ICD-10-CM

## 2024-03-22 DIAGNOSIS — C06.1: ICD-10-CM

## 2024-03-22 LAB — GLUCOSE, POC: 210 MG/DL

## 2024-03-22 ASSESSMENT — PATIENT HEALTH QUESTIONNAIRE - PHQ9
SUM OF ALL RESPONSES TO PHQ QUESTIONS 1-9: 0
1. LITTLE INTEREST OR PLEASURE IN DOING THINGS: NOT AT ALL
SUM OF ALL RESPONSES TO PHQ9 QUESTIONS 1 & 2: 0
SUM OF ALL RESPONSES TO PHQ QUESTIONS 1-9: 0

## 2024-03-22 ASSESSMENT — ENCOUNTER SYMPTOMS
STRIDOR: 0
CHEST TIGHTNESS: 0
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
## 2024-03-22 NOTE — PROGRESS NOTES
Chief Complaint   Patient presents with    Follow-up     Patient is here today for a 4 week follow up.      1. Have you been to the ER, urgent care clinic since your last visit?  Hospitalized since your last visit?No    2. Have you seen or consulted any other health care providers outside of the [facility] since your last visit?  Include any pap smears or colon screening. No

## 2024-03-24 LAB
TESTOST FREE SERPL-MCNC: 22.6 PG/ML (ref 7.2–24)
TESTOST SERPL-MCNC: 513 NG/DL (ref 264–916)

## 2024-04-19 ENCOUNTER — CLINICAL DOCUMENTATION (OUTPATIENT)
Age: 54
End: 2024-04-19

## 2024-04-19 NOTE — PROGRESS NOTES
Pt [employer]/ Customer Medical Report form was faxed back to [employer] today to [phone].  Pt was called and made aware.

## 2024-06-13 ENCOUNTER — TELEPHONE (OUTPATIENT)
Age: 54
End: 2024-06-13

## 2024-06-14 ENCOUNTER — TELEPHONE (OUTPATIENT)
Age: 54
End: 2024-06-14

## 2024-06-14 NOTE — TELEPHONE ENCOUNTER
[phone] - Patient left forms to be completed by the provider and faxed to [employer]. Forms were  placed in provider's box

## 2024-06-20 ENCOUNTER — CLINICAL DOCUMENTATION (OUTPATIENT)
Age: 54
End: 2024-06-20

## 2024-06-20 NOTE — PROGRESS NOTES
Pt [employer]- Customer Medical Report form was faxed back today to [phone].  The pt was called to  his original copy from our .

## 2024-06-26 ENCOUNTER — OFFICE VISIT (OUTPATIENT)
Age: 54
End: 2024-06-26
Payer: COMMERCIAL

## 2024-06-26 VITALS
HEIGHT: 71 IN | OXYGEN SATURATION: 98 % | RESPIRATION RATE: 18 BRPM | SYSTOLIC BLOOD PRESSURE: 109 MMHG | DIASTOLIC BLOOD PRESSURE: 68 MMHG | TEMPERATURE: 98.4 F | HEART RATE: 89 BPM | BODY MASS INDEX: 30.24 KG/M2 | WEIGHT: 216 LBS
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
## 2024-06-26 DIAGNOSIS — Z79.4 LONG TERM (CURRENT) USE OF INSULIN (HCC): ICD-10-CM

## 2024-06-26 DIAGNOSIS — I10 ESSENTIAL (PRIMARY) HYPERTENSION: ICD-10-CM

## 2024-06-26 DIAGNOSIS — E78.2 MIXED HYPERLIPIDEMIA: ICD-10-CM

## 2024-06-26 DIAGNOSIS — Z79.4 TYPE 2 DIABETES MELLITUS WITH BOTH EYES AFFECTED BY MILD NONPROLIFERATIVE RETINOPATHY WITHOUT MACULAR EDEMA, WITH LONG-TERM CURRENT USE OF INSULIN (HCC): ICD-10-CM

## 2024-06-26 DIAGNOSIS — C06.1: ICD-10-CM

## 2024-06-26 DIAGNOSIS — E11.3293 TYPE 2 DIABETES MELLITUS WITH BOTH EYES AFFECTED BY MILD NONPROLIFERATIVE RETINOPATHY WITHOUT MACULAR EDEMA, WITH LONG-TERM CURRENT USE OF INSULIN (HCC): ICD-10-CM

## 2024-06-26 DIAGNOSIS — E16.2 HYPOGLYCEMIA: ICD-10-CM

## 2024-06-26 LAB
ALBUMIN SERPL-MCNC: 3.8 G/DL (ref 3.5–5)
ALBUMIN/GLOB SERPL: 1.3 (ref 1.1–2.2)
ALP SERPL-CCNC: 125 U/L (ref 45–117)
ALT SERPL-CCNC: 34 U/L (ref 12–78)
ANION GAP SERPL CALC-SCNC: 5 MMOL/L (ref 5–15)
AST SERPL-CCNC: 29 U/L (ref 15–37)
BILIRUB SERPL-MCNC: 0.6 MG/DL (ref 0.2–1)
BUN SERPL-MCNC: 29 MG/DL (ref 6–20)
BUN/CREAT SERPL: 23 (ref 12–20)
CALCIUM SERPL-MCNC: 9.3 MG/DL (ref 8.5–10.1)
CHLORIDE SERPL-SCNC: 101 MMOL/L (ref 97–108)
CHOLEST SERPL-MCNC: 179 MG/DL
CO2 SERPL-SCNC: 28 MMOL/L (ref 21–32)
CREAT SERPL-MCNC: 1.28 MG/DL (ref 0.7–1.3)
GLOBULIN SER CALC-MCNC: 3 G/DL (ref 2–4)
GLUCOSE SERPL-MCNC: 232 MG/DL (ref 65–100)
HBA1C MFR BLD: 6.8 %
HDLC SERPL-MCNC: 46 MG/DL
HDLC SERPL: 3.9 (ref 0–5)
LDLC SERPL CALC-MCNC: 110.2 MG/DL (ref 0–100)
POTASSIUM SERPL-SCNC: 4.8 MMOL/L (ref 3.5–5.1)
PROT SERPL-MCNC: 6.8 G/DL (ref 6.4–8.2)
SODIUM SERPL-SCNC: 134 MMOL/L (ref 136–145)
TRIGL SERPL-MCNC: 114 MG/DL
VLDLC SERPL CALC-MCNC: 22.8 MG/DL

## 2024-06-26 PROCEDURE — 3078F DIAST BP <80 MM HG: CPT | Performed by: FAMILY MEDICINE

## 2024-06-26 PROCEDURE — 83036 HEMOGLOBIN GLYCOSYLATED A1C: CPT | Performed by: FAMILY MEDICINE

## 2024-06-26 PROCEDURE — 3074F SYST BP LT 130 MM HG: CPT | Performed by: FAMILY MEDICINE

## 2024-06-26 PROCEDURE — 99213 OFFICE O/P EST LOW 20 MIN: CPT | Performed by: FAMILY MEDICINE

## 2024-06-26 ASSESSMENT — PATIENT HEALTH QUESTIONNAIRE - PHQ9
SUM OF ALL RESPONSES TO PHQ QUESTIONS 1-9: 0
1. LITTLE INTEREST OR PLEASURE IN DOING THINGS: NOT AT ALL
SUM OF ALL RESPONSES TO PHQ QUESTIONS 1-9: 0
2. FEELING DOWN, DEPRESSED OR HOPELESS: NOT AT ALL
SUM OF ALL RESPONSES TO PHQ QUESTIONS 1-9: 0
SUM OF ALL RESPONSES TO PHQ QUESTIONS 1-9: 0
SUM OF ALL RESPONSES TO PHQ9 QUESTIONS 1 & 2: 0

## 2024-06-26 NOTE — PROGRESS NOTES
Chief Complaint   Patient presents with    Follow-up     Patient is here today for a 3 month follow up.      1. Have you been to the ER, urgent care clinic since your last visit?  Hospitalized since your last visit?No    2. Have you seen or consulted any other health care providers outside of the [facility] since your last visit?  Include any pap smears or colon screening. Dentist

## 2024-06-26 NOTE — PROGRESS NOTES
NAME:  Wayne Hartman   :   1970   MRN:   [id number]     Date/Time:  2024 8:21 AM  Subjective:f/u dm2 ,well controlled,has avoided further hypoglycemic spells.F/U CHOL,HBP.Feeling well.Needs f/u glossal ca   HPI     Diabetes  He presents for his follow-up diabetic visit. He has type 2 diabetes mellitus. The initial diagnosis of diabetes was made 5 years ago. His disease course has been stable. There are no hypoglycemic associated symptoms. Pertinent negatives for diabetes include no chest pain, no foot paresthesias, no polydipsia, no polyphagia and no polyuria. Risk factors for coronary artery disease include diabetes mellitus, dyslipidemia, hypertension and male sex.   Hyperlipidemia  This is a chronic problem. The problem is controlled. Recent lipid tests were reviewed and are normal. He has no history of chronic renal disease, liver disease or nephrotic syndrome. Pertinent negatives include no chest pain or shortness of breath.   Hypertension  This is a chronic problem. The problem is unchanged. The problem is controlled. Pertinent negatives include no chest pain, palpitations or shortness of breath. There is no history of chronic renal disease.   Review of Systems   HENT:  Negative for congestion.    Respiratory:  Negative for cough and chest tightness.    Cardiovascular:  Negative for chest pain.   Gastrointestinal:  Negative for abdominal distention.   Genitourinary:  Negative for difficulty urinating.   Musculoskeletal:  Negative for arthralgias.   Psychiatric/Behavioral:  Negative for decreased concentration.            Medications reviewed:  Current Outpatient Medications   Medication Sig    Semaglutide,0.25 or 0.5MG/DOS, (OZEMPIC, 0.25 OR 0.5 MG/DOSE,) 2 MG/3ML SOPN Inject 0.5 mg into the skin every 7 days    lisinopril (PRINIVIL;ZESTRIL) 20 MG tablet Take 1 tablet by mouth once daily    blood glucose test strips (ACCU-CHEK GUIDE) strip USE 1 STRIP TO CHECK GLUCOSE FOUR TIMES

## 2024-06-28 ENCOUNTER — CLINICAL DOCUMENTATION (OUTPATIENT)
Age: 54
End: 2024-06-28

## 2024-06-28 NOTE — PROGRESS NOTES
Pt [employer] forms were faxed back today with the pt blood sugar readings and the office noted from [facility] from 2/12/24.  Faxed to [phone].

## 2024-08-14 RX ORDER — LISINOPRIL 20 MG/1
TABLET ORAL
Qty: 90 TABLET | Refills: 1 | Status: SHIPPED | OUTPATIENT
Start: 2024-08-14

## 2024-08-14 NOTE — TELEPHONE ENCOUNTER
Last appointment: 6/26/24  Next appointment: 9/19/24  Previous refill encounter(s): 8/24/23    Requested Prescriptions     Pending Prescriptions Disp Refills    lisinopril (PRINIVIL;ZESTRIL) 20 MG tablet [Pharmacy Med Name: Lisinopril 20 MG Oral Tablet] 90 tablet 3     Sig: Take 1 tablet by mouth once daily         For Pharmacy Admin Tracking Only    Program: Medication Refill  CPA in place:    Recommendation Provided To:   Intervention Detail: New Rx: 1, reason: Patient Preference  Intervention Accepted By:   Gap Closed?:    Time Spent (min): 5

## 2024-09-19 ENCOUNTER — OFFICE VISIT (OUTPATIENT)
Age: 54
End: 2024-09-19
Payer: COMMERCIAL

## 2024-09-19 VITALS
DIASTOLIC BLOOD PRESSURE: 63 MMHG | HEIGHT: 71 IN | SYSTOLIC BLOOD PRESSURE: 100 MMHG | WEIGHT: 210.8 LBS | HEART RATE: 96 BPM | BODY MASS INDEX: 29.51 KG/M2 | OXYGEN SATURATION: 98 % | TEMPERATURE: 97.9 F | RESPIRATION RATE: 18 BRPM

## 2024-09-19 DIAGNOSIS — Z79.4 TYPE 2 DIABETES MELLITUS WITH BOTH EYES AFFECTED BY MILD NONPROLIFERATIVE RETINOPATHY WITHOUT MACULAR EDEMA, WITH LONG-TERM CURRENT USE OF INSULIN (HCC): Primary | ICD-10-CM

## 2024-09-19 DIAGNOSIS — E78.2 MIXED HYPERLIPIDEMIA: ICD-10-CM

## 2024-09-19 DIAGNOSIS — I10 ESSENTIAL (PRIMARY) HYPERTENSION: ICD-10-CM

## 2024-09-19 DIAGNOSIS — Z79.4 LONG TERM (CURRENT) USE OF INSULIN (HCC): ICD-10-CM

## 2024-09-19 DIAGNOSIS — E11.3293 TYPE 2 DIABETES MELLITUS WITH BOTH EYES AFFECTED BY MILD NONPROLIFERATIVE RETINOPATHY WITHOUT MACULAR EDEMA, WITH LONG-TERM CURRENT USE OF INSULIN (HCC): Primary | ICD-10-CM

## 2024-09-19 LAB — HBA1C MFR BLD: 6.7 %

## 2024-09-19 PROCEDURE — 83036 HEMOGLOBIN GLYCOSYLATED A1C: CPT | Performed by: FAMILY MEDICINE

## 2024-09-19 PROCEDURE — 3074F SYST BP LT 130 MM HG: CPT | Performed by: FAMILY MEDICINE

## 2024-09-19 PROCEDURE — 3078F DIAST BP <80 MM HG: CPT | Performed by: FAMILY MEDICINE

## 2024-09-19 PROCEDURE — 99214 OFFICE O/P EST MOD 30 MIN: CPT | Performed by: FAMILY MEDICINE

## 2024-09-19 SDOH — ECONOMIC STABILITY: FOOD INSECURITY: WITHIN THE PAST 12 MONTHS, YOU WORRIED THAT YOUR FOOD WOULD RUN OUT BEFORE YOU GOT MONEY TO BUY MORE.: NEVER TRUE

## 2024-09-19 SDOH — ECONOMIC STABILITY: FOOD INSECURITY: WITHIN THE PAST 12 MONTHS, THE FOOD YOU BOUGHT JUST DIDN'T LAST AND YOU DIDN'T HAVE MONEY TO GET MORE.: NEVER TRUE

## 2024-09-19 SDOH — ECONOMIC STABILITY: INCOME INSECURITY: HOW HARD IS IT FOR YOU TO PAY FOR THE VERY BASICS LIKE FOOD, HOUSING, MEDICAL CARE, AND HEATING?: SOMEWHAT HARD

## 2024-09-19 ASSESSMENT — ENCOUNTER SYMPTOMS
WHEEZING: 0
ABDOMINAL PAIN: 0
CHEST TIGHTNESS: 0
SHORTNESS OF BREATH: 0

## 2024-09-19 ASSESSMENT — PATIENT HEALTH QUESTIONNAIRE - PHQ9
SUM OF ALL RESPONSES TO PHQ QUESTIONS 1-9: 0
SUM OF ALL RESPONSES TO PHQ9 QUESTIONS 1 & 2: 0
SUM OF ALL RESPONSES TO PHQ QUESTIONS 1-9: 0
1. LITTLE INTEREST OR PLEASURE IN DOING THINGS: NOT AT ALL
2. FEELING DOWN, DEPRESSED OR HOPELESS: NOT AT ALL

## 2024-09-20 LAB
ALBUMIN SERPL-MCNC: 4.2 G/DL (ref 3.8–4.9)
ALP SERPL-CCNC: 147 IU/L (ref 44–121)
ALT SERPL-CCNC: 43 IU/L (ref 0–44)
AST SERPL-CCNC: 40 IU/L (ref 0–40)
BILIRUB SERPL-MCNC: 0.4 MG/DL (ref 0–1.2)
BUN SERPL-MCNC: 21 MG/DL (ref 6–24)
BUN/CREAT SERPL: 19 (ref 9–20)
CALCIUM SERPL-MCNC: 9.5 MG/DL (ref 8.7–10.2)
CHLORIDE SERPL-SCNC: 98 MMOL/L (ref 96–106)
CHOLEST SERPL-MCNC: 245 MG/DL (ref 100–199)
CO2 SERPL-SCNC: 26 MMOL/L (ref 20–29)
CREAT SERPL-MCNC: 1.11 MG/DL (ref 0.76–1.27)
EGFRCR SERPLBLD CKD-EPI 2021: 79 ML/MIN/1.73
GLOBULIN SER CALC-MCNC: 2.5 G/DL (ref 1.5–4.5)
GLUCOSE SERPL-MCNC: 152 MG/DL (ref 70–99)
POTASSIUM SERPL-SCNC: 4.7 MMOL/L (ref 3.5–5.2)
PROT SERPL-MCNC: 6.7 G/DL (ref 6–8.5)
SODIUM SERPL-SCNC: 138 MMOL/L (ref 134–144)

## 2024-09-26 DIAGNOSIS — I10 ESSENTIAL (PRIMARY) HYPERTENSION: Primary | ICD-10-CM

## 2024-09-26 RX ORDER — LISINOPRIL 20 MG/1
20 TABLET ORAL DAILY
Qty: 90 TABLET | Refills: 2 | Status: SHIPPED | OUTPATIENT
Start: 2024-09-26

## 2024-10-22 ENCOUNTER — CLINICAL DOCUMENTATION (OUTPATIENT)
Age: 54
End: 2024-10-22

## 2024-10-23 RX ORDER — SEMAGLUTIDE 0.68 MG/ML
INJECTION, SOLUTION SUBCUTANEOUS
Qty: 3 ML | Refills: 0 | Status: SHIPPED | OUTPATIENT
Start: 2024-10-23

## 2024-10-23 NOTE — TELEPHONE ENCOUNTER
Last appointment: 9/19/24  Next appointment: 1/13/25  Previous refill encounter(s): 11/28/23    Requested Prescriptions     Pending Prescriptions Disp Refills    Semaglutide,0.25 or 0.5MG/DOS, (OZEMPIC, 0.25 OR 0.5 MG/DOSE,) 2 MG/3ML SOPN [Pharmacy Med Name: Ozempic (0.25 or 0.5 MG/DOSE) 2 MG/3ML Subcutaneous Solution Pen-injector] 3 mL 0     Sig: INJECT 0.5MG INTO THE SKIN EVERY 7 DAYS         For Pharmacy Admin Tracking Only    Program: Medication Refill  CPA in place:    Recommendation Provided To:   Intervention Detail: New Rx: 1, reason: Patient Preference  Intervention Accepted By:   Gap Closed?:    Time Spent (min): 5

## 2024-10-29 ENCOUNTER — CLINICAL DOCUMENTATION (OUTPATIENT)
Age: 54
End: 2024-10-29

## 2024-11-12 NOTE — PROGRESS NOTES
Progress Note    Patient: Irving November MRN: [id number]  SSN: [id number]    YOB: 1970  Age: 51 y.o. Sex: male      Admit Date: 7/31/2017    LOS: 3 days     Subjective:     No acute events overnight. Pt notes his sore throat is much improved. He has been eating well and drinking plenty of water. He denies issues of N/V, abdominal pains. Pt notes he is ready to go home and \"sleep in my own bed\". Objective:     Vitals:    08/02/17 1536 08/02/17 1538 08/02/17 2100 08/02/17 2137   BP: 122/67 122/67 144/69 137/89   Pulse:  80 87 95   Resp:  16 18 18   Temp:  97 °F (36.1 °C) 98.4 °F (36.9 °C) 98.2 °F (36.8 °C)   SpO2:  95% 94% 99%   Weight:            Intake and Output:  Current Shift:    Last three shifts: 08/01 1901 - 08/03 0700  In: 1214 [P.O.:1100; I.V.:2472]  Out: 2650 [Urine:2650]    Physical Exam:   GENERAL: alert, cooperative, no distress, appears stated age  ABDOMEN: soft, non-tender. Bowel sounds normal. No masses,  no organomegaly  EXTREMITIES:  extremities normal, atraumatic, no cyanosis or edema  SKIN: Normal.    Lab/Data Review: All lab results for the last 24 hours reviewed. Assessment:     Active Problems:    DKA (diabetic ketoacidoses) ([address] (7/31/2017)      Type 2 diabetes mellitus with ophthalmic complication, without long-term current use of insulin ([address] (8/1/2017)      Acute renal failure with tubular necrosis ([address] (8/1/2017)      Projectile vomiting with nausea (8/3/2017)        Plan:     1) DM > Looking at his BGs and how much correction insulin he needed over the last 24 hour, it looks like the 40 units of Humalog 75:25 is a good dose, but the 14 units with breakfast was too low as he did require correction. I will change his AM dose to 20 units and recommended to him increasing his home Humalin 70:30 to 20 units with breakfast and 40 units with dinner.  When he goes home I would restart his Metformin 1000mg BID but hold the Pioglitazone and Glipizide for now.  I do want to see him in my office in a 2-3 weeks so we can evaluate his BGs on this home regimen. At that time I would like to evaluate his Beta-cell function to see if he would benefit from an oral regimen. I suspect he will need some form of insulin as part of that regimen, but if he still has beta-cell function, he could benefit from some oral agents. His ADARSH and Anti-insulin Ab are still pending. I spent 25 minutes of time on his case and > 50% of the time was spent reviewing the chart and coordinating his care.       Signed By: Alica Schaumann, MD     August 3, 2017 Is This A New Presentation, Or A Follow-Up?: Skin Lesions How Severe Is Your Skin Lesion?: mild Has Your Skin Lesion Been Treated?: not been treated

## 2025-01-06 DIAGNOSIS — Z79.4 TYPE 2 DIABETES MELLITUS WITH BOTH EYES AFFECTED BY MILD NONPROLIFERATIVE RETINOPATHY WITHOUT MACULAR EDEMA, WITH LONG-TERM CURRENT USE OF INSULIN (HCC): Primary | ICD-10-CM

## 2025-01-06 DIAGNOSIS — E11.3293 TYPE 2 DIABETES MELLITUS WITH BOTH EYES AFFECTED BY MILD NONPROLIFERATIVE RETINOPATHY WITHOUT MACULAR EDEMA, WITH LONG-TERM CURRENT USE OF INSULIN (HCC): Primary | ICD-10-CM

## 2025-01-07 RX ORDER — SEMAGLUTIDE 0.68 MG/ML
INJECTION, SOLUTION SUBCUTANEOUS
Qty: 3 ML | Refills: 2 | Status: SHIPPED | OUTPATIENT
Start: 2025-01-07

## 2025-01-07 NOTE — TELEPHONE ENCOUNTER
Last appointment: 9/19/24  Next appointment: 1/13/25  Previous refill encounter(s): 10/23/24 #3ml    Requested Prescriptions     Pending Prescriptions Disp Refills    OZEMPIC, 0.25 OR 0.5 MG/DOSE, 2 MG/3ML SOPN [Pharmacy Med Name: Ozempic (0.25 or 0.5 MG/DOSE) 2 MG/3ML Subcutaneous Solution Pen-injector] 3 mL 0     Sig: INJECT 0.5MG INTO THE SKIN EVERY 7 DAYS         For Pharmacy Admin Tracking Only    Program: Medication Refill  CPA in place:    Recommendation Provided To:   Intervention Detail: New Rx: 1, reason: Patient Preference  Intervention Accepted By:   Gap Closed?:    Time Spent (min): 5

## 2025-01-13 ENCOUNTER — OFFICE VISIT (OUTPATIENT)
Age: 55
End: 2025-01-13
Payer: COMMERCIAL

## 2025-01-13 VITALS
RESPIRATION RATE: 18 BRPM | SYSTOLIC BLOOD PRESSURE: 116 MMHG | BODY MASS INDEX: 30.1 KG/M2 | WEIGHT: 215 LBS | TEMPERATURE: 97.9 F | DIASTOLIC BLOOD PRESSURE: 69 MMHG | HEART RATE: 95 BPM | OXYGEN SATURATION: 97 % | HEIGHT: 71 IN

## 2025-01-13 DIAGNOSIS — Z12.5 SCREENING FOR MALIGNANT NEOPLASM OF PROSTATE: ICD-10-CM

## 2025-01-13 DIAGNOSIS — E11.3293 TYPE 2 DIABETES MELLITUS WITH BOTH EYES AFFECTED BY MILD NONPROLIFERATIVE RETINOPATHY WITHOUT MACULAR EDEMA, WITH LONG-TERM CURRENT USE OF INSULIN (HCC): Primary | ICD-10-CM

## 2025-01-13 DIAGNOSIS — Z79.4 TYPE 2 DIABETES MELLITUS WITH BOTH EYES AFFECTED BY MILD NONPROLIFERATIVE RETINOPATHY WITHOUT MACULAR EDEMA, WITH LONG-TERM CURRENT USE OF INSULIN (HCC): Primary | ICD-10-CM

## 2025-01-13 DIAGNOSIS — E78.2 MIXED HYPERLIPIDEMIA: ICD-10-CM

## 2025-01-13 DIAGNOSIS — I10 ESSENTIAL (PRIMARY) HYPERTENSION: ICD-10-CM

## 2025-01-13 DIAGNOSIS — Z12.11 SCREEN FOR COLON CANCER: ICD-10-CM

## 2025-01-13 LAB — HBA1C MFR BLD: 6.9 %

## 2025-01-13 PROCEDURE — 99214 OFFICE O/P EST MOD 30 MIN: CPT | Performed by: FAMILY MEDICINE

## 2025-01-13 PROCEDURE — 3074F SYST BP LT 130 MM HG: CPT | Performed by: FAMILY MEDICINE

## 2025-01-13 PROCEDURE — 83036 HEMOGLOBIN GLYCOSYLATED A1C: CPT | Performed by: FAMILY MEDICINE

## 2025-01-13 PROCEDURE — 3078F DIAST BP <80 MM HG: CPT | Performed by: FAMILY MEDICINE

## 2025-01-13 SDOH — ECONOMIC STABILITY: FOOD INSECURITY: WITHIN THE PAST 12 MONTHS, THE FOOD YOU BOUGHT JUST DIDN'T LAST AND YOU DIDN'T HAVE MONEY TO GET MORE.: NEVER TRUE

## 2025-01-13 SDOH — ECONOMIC STABILITY: FOOD INSECURITY: WITHIN THE PAST 12 MONTHS, YOU WORRIED THAT YOUR FOOD WOULD RUN OUT BEFORE YOU GOT MONEY TO BUY MORE.: NEVER TRUE

## 2025-01-13 ASSESSMENT — PATIENT HEALTH QUESTIONNAIRE - PHQ9
SUM OF ALL RESPONSES TO PHQ9 QUESTIONS 1 & 2: 0
SUM OF ALL RESPONSES TO PHQ QUESTIONS 1-9: 0
SUM OF ALL RESPONSES TO PHQ QUESTIONS 1-9: 0
2. FEELING DOWN, DEPRESSED OR HOPELESS: NOT AT ALL
1. LITTLE INTEREST OR PLEASURE IN DOING THINGS: NOT AT ALL
SUM OF ALL RESPONSES TO PHQ QUESTIONS 1-9: 0
SUM OF ALL RESPONSES TO PHQ QUESTIONS 1-9: 0

## 2025-01-13 ASSESSMENT — ENCOUNTER SYMPTOMS
CHEST TIGHTNESS: 0
WHEEZING: 0
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
## 2025-01-13 NOTE — PROGRESS NOTES
Chief Complaint   Patient presents with    Follow-up     Patient is here today for a 4 month follow up.      \"Have you been to the ER, urgent care clinic since your last visit?  Hospitalized since your last visit?\"    NO    “Have you seen or consulted any other health care providers outside of [facility] since your last visit?”    NO            Click Here for Release of Records Request

## 2025-01-14 LAB
ALBUMIN SERPL-MCNC: 4.4 G/DL (ref 3.8–4.9)
ALP SERPL-CCNC: 116 IU/L (ref 44–121)
ALT SERPL-CCNC: 29 IU/L (ref 0–44)
AST SERPL-CCNC: 36 IU/L (ref 0–40)
BILIRUB SERPL-MCNC: 0.4 MG/DL (ref 0–1.2)
BUN SERPL-MCNC: 18 MG/DL (ref 6–24)
BUN/CREAT SERPL: 16 (ref 9–20)
CALCIUM SERPL-MCNC: 9.4 MG/DL (ref 8.7–10.2)
CHLORIDE SERPL-SCNC: 101 MMOL/L (ref 96–106)
CHOLEST SERPL-MCNC: 189 MG/DL (ref 100–199)
CO2 SERPL-SCNC: 24 MMOL/L (ref 20–29)
CREAT SERPL-MCNC: 1.14 MG/DL (ref 0.76–1.27)
EGFRCR SERPLBLD CKD-EPI 2021: 76 ML/MIN/1.73
GLOBULIN SER CALC-MCNC: 2.2 G/DL (ref 1.5–4.5)
GLUCOSE SERPL-MCNC: 126 MG/DL (ref 70–99)
POTASSIUM SERPL-SCNC: 4.8 MMOL/L (ref 3.5–5.2)
PROT SERPL-MCNC: 6.6 G/DL (ref 6–8.5)
SODIUM SERPL-SCNC: 140 MMOL/L (ref 134–144)

## 2025-01-22 ENCOUNTER — CLINICAL DOCUMENTATION (OUTPATIENT)
Age: 55
End: 2025-01-22

## 2025-02-26 ENCOUNTER — TELEPHONE (OUTPATIENT)
Age: 55
End: 2025-02-26

## 2025-02-26 NOTE — TELEPHONE ENCOUNTER
[phone] - Patient left DMV forms to be completed and faxed back. Also would like to  forms once completed. Forms placed in provider box

## 2025-03-21 ENCOUNTER — CLINICAL DOCUMENTATION (OUTPATIENT)
Age: 55
End: 2025-03-21

## 2025-03-21 NOTE — PROGRESS NOTES
Pt customer medical report was faxed today to DM at [phone].  Pt called and made aware and requested the form be mailed out to him.  Forms mailed out today.

## 2025-03-26 DIAGNOSIS — E11.3293 TYPE 2 DIABETES MELLITUS WITH BOTH EYES AFFECTED BY MILD NONPROLIFERATIVE RETINOPATHY WITHOUT MACULAR EDEMA, WITH LONG-TERM CURRENT USE OF INSULIN: ICD-10-CM

## 2025-03-26 DIAGNOSIS — Z79.4 TYPE 2 DIABETES MELLITUS WITH BOTH EYES AFFECTED BY MILD NONPROLIFERATIVE RETINOPATHY WITHOUT MACULAR EDEMA, WITH LONG-TERM CURRENT USE OF INSULIN: ICD-10-CM

## 2025-03-27 RX ORDER — SEMAGLUTIDE 0.68 MG/ML
INJECTION, SOLUTION SUBCUTANEOUS
Qty: 3 ML | Refills: 2 | Status: SHIPPED | OUTPATIENT
Start: 2025-03-27

## 2025-03-27 NOTE — TELEPHONE ENCOUNTER
Last appointment: 1/13/25  Next appointment: 4/15/25  Previous refill encounter(s): 1/7/25 #3ml    Requested Prescriptions     Pending Prescriptions Disp Refills    Semaglutide,0.25 or 0.5MG/DOS, (OZEMPIC, 0.25 OR 0.5 MG/DOSE,) 2 MG/3ML SOPN [Pharmacy Med Name: Ozempic (0.25 or 0.5 MG/DOSE) 2 MG/3ML Subcutaneous Solution Pen-injector] 3 mL 2     Sig: INJECT 0.5 MG SUBCUTANEOUSLY ONCE A WEEK         For Pharmacy Admin Tracking Only    Program: Medication Refill  CPA in place:    Recommendation Provided To:   Intervention Detail: New Rx: 1, reason: Patient Preference  Intervention Accepted By:   Gap Closed?:    Time Spent (min): 5

## 2025-04-12 NOTE — PROGRESS NOTES
NAME:  Wayne Hartman   :   1970   MRN:   [id number]     Date/Time:  2025 7:31 PM  Subjective: f/u dm2,hbp.Feeling well,refuses statens   HPI   Diabetes  He presents for his follow-up diabetic visit. He has type 2 diabetes mellitus.  The initial diagnosis of diabetes was made 5 years ago. His disease course has been stable. There are generally no hypoglycemic associated symptoms. Pertinent negatives for diabetes include no chest pain, no foot paresthesias, no polydipsia, no polyphagia and no polyuria. Risk factors for coronary artery disease include diabetes mellitus, dyslipidemia, hypertension and male sex.   Hyperlipidemia  This is a chronic problem. The problem is controlled. Recent lipid tests were reviewed and are normal. He has no history of chronic renal disease, liver disease or nephrotic syndrome. Pertinent negatives include no chest pain or shortness of breath.   Hypertension  This is a chronic problem. The problem is unchanged. The problem is controlled. Pertinent negatives include no chest pain, palpitations or shortness of breath. There is no history of chronic renal disease.      Review of Systems          Medications reviewed:  Current Outpatient Medications   Medication Sig    OZEMPIC, 0.25 OR 0.5 MG/DOSE, 2 MG/3ML SOPN INJECT 0.5 MG SUBCUTANEOUSLY ONCE A WEEK    lisinopril (PRINIVIL;ZESTRIL) 20 MG tablet Take 1 tablet by mouth daily    blood glucose test strips (ACCU-CHEK GUIDE) strip USE 1 STRIP TO CHECK GLUCOSE FOUR TIMES DAILY BEFORE BREAKFAST LUNCH DINNER AND AT BEDTIME    insulin 70-30 (HUMULIN;NOVOLIN) (70-30) 100 UNIT per ML injection vial Inject between 5 and 10 units twice daily with breakfast and dinnr depending on BG and carb intake, as directed.     No current facility-administered medications for this visit.        Objective:   Vitals:  /69 (BP Site: Left Upper Arm, Patient Position: Sitting, BP Cuff Size: Large Adult)   Pulse 85   Temp 97.7 °F (36.5

## 2025-04-15 ENCOUNTER — OFFICE VISIT (OUTPATIENT)
Age: 55
End: 2025-04-15
Payer: COMMERCIAL

## 2025-04-15 VITALS
RESPIRATION RATE: 18 BRPM | BODY MASS INDEX: 29.9 KG/M2 | OXYGEN SATURATION: 98 % | DIASTOLIC BLOOD PRESSURE: 69 MMHG | HEIGHT: 71 IN | SYSTOLIC BLOOD PRESSURE: 114 MMHG | TEMPERATURE: 97.7 F | WEIGHT: 213.6 LBS | HEART RATE: 85 BPM

## 2025-04-15 DIAGNOSIS — I10 ESSENTIAL (PRIMARY) HYPERTENSION: ICD-10-CM

## 2025-04-15 DIAGNOSIS — Z79.4 TYPE 2 DIABETES MELLITUS WITH BOTH EYES AFFECTED BY MILD NONPROLIFERATIVE RETINOPATHY WITHOUT MACULAR EDEMA, WITH LONG-TERM CURRENT USE OF INSULIN: Primary | ICD-10-CM

## 2025-04-15 DIAGNOSIS — Z79.4 TYPE 2 DIABETES MELLITUS WITH BOTH EYES AFFECTED BY MILD NONPROLIFERATIVE RETINOPATHY WITHOUT MACULAR EDEMA, WITH LONG-TERM CURRENT USE OF INSULIN (HCC): ICD-10-CM

## 2025-04-15 DIAGNOSIS — E11.3293 TYPE 2 DIABETES MELLITUS WITH BOTH EYES AFFECTED BY MILD NONPROLIFERATIVE RETINOPATHY WITHOUT MACULAR EDEMA, WITH LONG-TERM CURRENT USE OF INSULIN: Primary | ICD-10-CM

## 2025-04-15 DIAGNOSIS — E78.2 MIXED HYPERLIPIDEMIA: ICD-10-CM

## 2025-04-15 DIAGNOSIS — E11.3293 TYPE 2 DIABETES MELLITUS WITH BOTH EYES AFFECTED BY MILD NONPROLIFERATIVE RETINOPATHY WITHOUT MACULAR EDEMA, WITH LONG-TERM CURRENT USE OF INSULIN (HCC): ICD-10-CM

## 2025-04-15 LAB — HBA1C MFR BLD: 7.4 %

## 2025-04-15 PROCEDURE — 3074F SYST BP LT 130 MM HG: CPT | Performed by: FAMILY MEDICINE

## 2025-04-15 PROCEDURE — 3078F DIAST BP <80 MM HG: CPT | Performed by: FAMILY MEDICINE

## 2025-04-15 PROCEDURE — 99213 OFFICE O/P EST LOW 20 MIN: CPT | Performed by: FAMILY MEDICINE

## 2025-04-15 PROCEDURE — 3051F HG A1C>EQUAL 7.0%<8.0%: CPT | Performed by: FAMILY MEDICINE

## 2025-04-15 PROCEDURE — 83036 HEMOGLOBIN GLYCOSYLATED A1C: CPT | Performed by: FAMILY MEDICINE

## 2025-04-15 SDOH — ECONOMIC STABILITY: FOOD INSECURITY: WITHIN THE PAST 12 MONTHS, YOU WORRIED THAT YOUR FOOD WOULD RUN OUT BEFORE YOU GOT MONEY TO BUY MORE.: NEVER TRUE

## 2025-04-15 SDOH — ECONOMIC STABILITY: FOOD INSECURITY: WITHIN THE PAST 12 MONTHS, THE FOOD YOU BOUGHT JUST DIDN'T LAST AND YOU DIDN'T HAVE MONEY TO GET MORE.: NEVER TRUE

## 2025-04-15 ASSESSMENT — PATIENT HEALTH QUESTIONNAIRE - PHQ9
1. LITTLE INTEREST OR PLEASURE IN DOING THINGS: NOT AT ALL
2. FEELING DOWN, DEPRESSED OR HOPELESS: NOT AT ALL
SUM OF ALL RESPONSES TO PHQ QUESTIONS 1-9: 0

## 2025-04-15 NOTE — PROGRESS NOTES
Chief Complaint   Patient presents with    Follow-up     Patient is here today for a 3 month follow up.      \"Have you been to the ER, urgent care clinic since your last visit?  Hospitalized since your last visit?\"    NO    “Have you seen or consulted any other health care providers outside of [facility] since your last visit?”    NO            Click Here for Release of Records Request

## 2025-04-16 LAB
ALBUMIN SERPL-MCNC: 4.3 G/DL (ref 3.8–4.9)
ALBUMIN/CREAT UR: 11 MG/G CREAT (ref 0–29)
ALP SERPL-CCNC: 138 IU/L (ref 44–121)
ALT SERPL-CCNC: 29 IU/L (ref 0–44)
AST SERPL-CCNC: 26 IU/L (ref 0–40)
BILIRUB SERPL-MCNC: 0.5 MG/DL (ref 0–1.2)
BUN SERPL-MCNC: 15 MG/DL (ref 6–24)
BUN/CREAT SERPL: 12 (ref 9–20)
CALCIUM SERPL-MCNC: 9.4 MG/DL (ref 8.7–10.2)
CHLORIDE SERPL-SCNC: 98 MMOL/L (ref 96–106)
CO2 SERPL-SCNC: 24 MMOL/L (ref 20–29)
CREAT SERPL-MCNC: 1.24 MG/DL (ref 0.76–1.27)
CREAT UR-MCNC: 150.7 MG/DL
EGFRCR SERPLBLD CKD-EPI 2021: 69 ML/MIN/1.73
GLOBULIN SER CALC-MCNC: 1.9 G/DL (ref 1.5–4.5)
GLUCOSE SERPL-MCNC: 201 MG/DL (ref 70–99)
MICROALBUMIN UR-MCNC: 17.1 UG/ML
POTASSIUM SERPL-SCNC: 4.2 MMOL/L (ref 3.5–5.2)
PROT SERPL-MCNC: 6.2 G/DL (ref 6–8.5)
SODIUM SERPL-SCNC: 136 MMOL/L (ref 134–144)

## 2025-04-17 ENCOUNTER — RESULTS FOLLOW-UP (OUTPATIENT)
Age: 55
End: 2025-04-17

## 2025-06-09 DIAGNOSIS — Z79.4 TYPE 2 DIABETES MELLITUS WITH BOTH EYES AFFECTED BY MILD NONPROLIFERATIVE RETINOPATHY WITHOUT MACULAR EDEMA, WITH LONG-TERM CURRENT USE OF INSULIN (HCC): ICD-10-CM

## 2025-06-09 DIAGNOSIS — E11.3293 TYPE 2 DIABETES MELLITUS WITH BOTH EYES AFFECTED BY MILD NONPROLIFERATIVE RETINOPATHY WITHOUT MACULAR EDEMA, WITH LONG-TERM CURRENT USE OF INSULIN (HCC): ICD-10-CM

## 2025-06-10 RX ORDER — SEMAGLUTIDE 0.68 MG/ML
INJECTION, SOLUTION SUBCUTANEOUS
Qty: 3 ML | Refills: 0 | Status: SHIPPED | OUTPATIENT
Start: 2025-06-10

## 2025-06-10 NOTE — TELEPHONE ENCOUNTER
Last appointment: 4/15/25  Next appointment: 8/15/25  Previous refill encounter(s): 3/27/25 #3ml with 2 refills    Requested Prescriptions     Pending Prescriptions Disp Refills    OZEMPIC, 0.25 OR 0.5 MG/DOSE, 2 MG/3ML SOPN [Pharmacy Med Name: Ozempic (0.25 or 0.5 MG/DOSE) 2 MG/3ML Subcutaneous Solution Pen-injector] 3 mL 0     Sig: INJECT 0.5 MG SUBCUTANEOUSLY ONCE A WEEK         For Pharmacy Admin Tracking Only    Program: Medication Refill  CPA in place:    Recommendation Provided To:   Intervention Detail: New Rx: 1, reason: Patient Preference  Intervention Accepted By:   Gap Closed?:    Time Spent (min): 5

## 2025-08-08 DIAGNOSIS — Z79.4 TYPE 2 DIABETES MELLITUS WITH BOTH EYES AFFECTED BY MILD NONPROLIFERATIVE RETINOPATHY WITHOUT MACULAR EDEMA, WITH LONG-TERM CURRENT USE OF INSULIN (HCC): ICD-10-CM

## 2025-08-08 DIAGNOSIS — E11.3293 TYPE 2 DIABETES MELLITUS WITH BOTH EYES AFFECTED BY MILD NONPROLIFERATIVE RETINOPATHY WITHOUT MACULAR EDEMA, WITH LONG-TERM CURRENT USE OF INSULIN (HCC): ICD-10-CM

## 2025-08-08 RX ORDER — SEMAGLUTIDE 0.68 MG/ML
INJECTION, SOLUTION SUBCUTANEOUS
Qty: 3 ML | Refills: 5 | Status: SHIPPED | OUTPATIENT
Start: 2025-08-08

## 2025-08-15 ENCOUNTER — OFFICE VISIT (OUTPATIENT)
Age: 55
End: 2025-08-15
Payer: COMMERCIAL

## 2025-08-15 VITALS
TEMPERATURE: 96.9 F | DIASTOLIC BLOOD PRESSURE: 68 MMHG | SYSTOLIC BLOOD PRESSURE: 108 MMHG | OXYGEN SATURATION: 96 % | BODY MASS INDEX: 29.85 KG/M2 | HEIGHT: 71 IN | WEIGHT: 213.2 LBS | RESPIRATION RATE: 18 BRPM | HEART RATE: 82 BPM

## 2025-08-15 DIAGNOSIS — E11.3293 TYPE 2 DIABETES MELLITUS WITH BOTH EYES AFFECTED BY MILD NONPROLIFERATIVE RETINOPATHY WITHOUT MACULAR EDEMA, WITH LONG-TERM CURRENT USE OF INSULIN (HCC): Primary | ICD-10-CM

## 2025-08-15 DIAGNOSIS — E78.2 MIXED HYPERLIPIDEMIA: ICD-10-CM

## 2025-08-15 DIAGNOSIS — Z79.4 TYPE 2 DIABETES MELLITUS WITH BOTH EYES AFFECTED BY MILD NONPROLIFERATIVE RETINOPATHY WITHOUT MACULAR EDEMA, WITH LONG-TERM CURRENT USE OF INSULIN (HCC): Primary | ICD-10-CM

## 2025-08-15 DIAGNOSIS — I10 ESSENTIAL (PRIMARY) HYPERTENSION: ICD-10-CM

## 2025-08-15 DIAGNOSIS — Z13.29 SCREENING FOR HYPOTHYROIDISM: ICD-10-CM

## 2025-08-15 DIAGNOSIS — C06.1: ICD-10-CM

## 2025-08-15 LAB — HBA1C MFR BLD: 7.1 %

## 2025-08-15 PROCEDURE — 3051F HG A1C>EQUAL 7.0%<8.0%: CPT | Performed by: FAMILY MEDICINE

## 2025-08-15 PROCEDURE — 3074F SYST BP LT 130 MM HG: CPT | Performed by: FAMILY MEDICINE

## 2025-08-15 PROCEDURE — 3078F DIAST BP <80 MM HG: CPT | Performed by: FAMILY MEDICINE

## 2025-08-15 PROCEDURE — 99213 OFFICE O/P EST LOW 20 MIN: CPT | Performed by: FAMILY MEDICINE

## 2025-08-15 PROCEDURE — 83036 HEMOGLOBIN GLYCOSYLATED A1C: CPT | Performed by: FAMILY MEDICINE

## 2025-08-15 ASSESSMENT — ENCOUNTER SYMPTOMS
CHEST TIGHTNESS: 0
CHOKING: 0
WHEEZING: 0

## 2025-08-15 ASSESSMENT — PATIENT HEALTH QUESTIONNAIRE - PHQ9
SUM OF ALL RESPONSES TO PHQ QUESTIONS 1-9: 0
2. FEELING DOWN, DEPRESSED OR HOPELESS: NOT AT ALL
1. LITTLE INTEREST OR PLEASURE IN DOING THINGS: NOT AT ALL
SUM OF ALL RESPONSES TO PHQ QUESTIONS 1-9: 0

## 2025-08-16 LAB
ALBUMIN SERPL-MCNC: 4.4 G/DL (ref 3.8–4.9)
ALP SERPL-CCNC: 108 IU/L (ref 44–121)
ALT SERPL-CCNC: 22 IU/L (ref 0–44)
AST SERPL-CCNC: 28 IU/L (ref 0–40)
BILIRUB SERPL-MCNC: 0.4 MG/DL (ref 0–1.2)
BUN SERPL-MCNC: 17 MG/DL (ref 6–24)
BUN/CREAT SERPL: 16 (ref 9–20)
CALCIUM SERPL-MCNC: 9.7 MG/DL (ref 8.7–10.2)
CHLORIDE SERPL-SCNC: 100 MMOL/L (ref 96–106)
CO2 SERPL-SCNC: 25 MMOL/L (ref 20–29)
CREAT SERPL-MCNC: 1.04 MG/DL (ref 0.76–1.27)
EGFRCR SERPLBLD CKD-EPI 2021: 85 ML/MIN/1.73
GLOBULIN SER CALC-MCNC: 1.8 G/DL (ref 1.5–4.5)
GLUCOSE SERPL-MCNC: 122 MG/DL (ref 70–99)
POTASSIUM SERPL-SCNC: 5 MMOL/L (ref 3.5–5.2)
PROT SERPL-MCNC: 6.2 G/DL (ref 6–8.5)
SODIUM SERPL-SCNC: 139 MMOL/L (ref 134–144)
T4 SERPL-MCNC: 5.4 UG/DL (ref 4.5–12)
TSH SERPL DL<=0.005 MIU/L-ACNC: 1.84 UIU/ML (ref 0.45–4.5)

## 2025-08-18 ENCOUNTER — RESULTS FOLLOW-UP (OUTPATIENT)
Age: 55
End: 2025-08-18